# Patient Record
Sex: FEMALE | Race: WHITE | NOT HISPANIC OR LATINO | Employment: OTHER | URBAN - METROPOLITAN AREA
[De-identification: names, ages, dates, MRNs, and addresses within clinical notes are randomized per-mention and may not be internally consistent; named-entity substitution may affect disease eponyms.]

---

## 2016-07-08 LAB
CREAT SERPL-MCNC: 0.89 MG/DL (ref 0.57–1.11)
GFR SERPL CREATININE-BSD FRML MDRD: >60 ML/MIN/1.73M2
GLUCOSE SERPL-MCNC: 78 MG/DL (ref 70–100)
POTASSIUM SERPL-SCNC: 4.1 MMOL/L (ref 3.5–5.1)
TSH SERPL-ACNC: 1.22 UIU/ML (ref 0.47–5)

## 2017-02-20 DIAGNOSIS — F79 MENTAL RETARDATION: ICD-10-CM

## 2017-02-20 DIAGNOSIS — F84.0 ACTIVE AUTISTIC DISORDER: ICD-10-CM

## 2017-02-20 NOTE — TELEPHONE ENCOUNTER
ascorbic acid (VITAMIN C) 500 MG tablet      Last Written Prescription Date: 1/14/16  Last Fill Quantity: 100,  # refills: 3   Last Office Visit with [facility], [facility] or [facility] prescribing provider: 9/26/14

## 2017-02-21 RX ORDER — ASCORBIC ACID 500 MG
TABLET ORAL
Qty: 30 TABLET | Refills: 0 | Status: SHIPPED | OUTPATIENT
Start: 2017-02-21 | End: 2017-03-31

## 2017-02-21 NOTE — TELEPHONE ENCOUNTER
Routing refill request to provider for review/approval because:  Patient needs to be seen because it has been more than 1 year since last office visit. Patient has not been seen since 2014    Yi Juárze RN  [facility]

## 2017-02-23 DIAGNOSIS — R82.90 NONSPECIFIC FINDING ON EXAMINATION OF URINE: ICD-10-CM

## 2017-02-23 DIAGNOSIS — Z87.440 HISTORY OF URINARY TRACT INFECTION: ICD-10-CM

## 2017-02-23 LAB
ALBUMIN UR-MCNC: 30 MG/DL
APPEARANCE UR: ABNORMAL
BACTERIA #/AREA URNS HPF: ABNORMAL /HPF
BILIRUB UR QL STRIP: NEGATIVE
COLOR UR AUTO: YELLOW
GLUCOSE UR STRIP-MCNC: NEGATIVE MG/DL
HGB UR QL STRIP: ABNORMAL
KETONES UR STRIP-MCNC: NEGATIVE MG/DL
LEUKOCYTE ESTERASE UR QL STRIP: ABNORMAL
NITRATE UR QL: NEGATIVE
NON-SQ EPI CELLS #/AREA URNS LPF: ABNORMAL /LPF
PH UR STRIP: 6 PH (ref 5–7)
RBC #/AREA URNS AUTO: ABNORMAL /HPF (ref 0–2)
SP GR UR STRIP: 1.02 (ref 1–1.03)
URN SPEC COLLECT METH UR: ABNORMAL
UROBILINOGEN UR STRIP-ACNC: 1 EU/DL (ref 0.2–1)
WBC #/AREA URNS AUTO: >100 /HPF (ref 0–2)

## 2017-02-23 PROCEDURE — 81001 URINALYSIS AUTO W/SCOPE: CPT | Performed by: FAMILY MEDICINE

## 2017-02-23 PROCEDURE — 87086 URINE CULTURE/COLONY COUNT: CPT | Performed by: FAMILY MEDICINE

## 2017-02-23 PROCEDURE — 87088 URINE BACTERIA CULTURE: CPT | Performed by: FAMILY MEDICINE

## 2017-02-23 PROCEDURE — 87186 SC STD MICRODIL/AGAR DIL: CPT | Performed by: FAMILY MEDICINE

## 2017-02-24 ENCOUNTER — OFFICE VISIT (OUTPATIENT)
Dept: URGENT CARE | Facility: RETAIL CLINIC | Age: 32
End: 2017-02-24
Payer: MEDICARE

## 2017-02-24 VITALS
OXYGEN SATURATION: 98 % | TEMPERATURE: 99.6 F | HEART RATE: 92 BPM | SYSTOLIC BLOOD PRESSURE: 125 MMHG | DIASTOLIC BLOOD PRESSURE: 88 MMHG

## 2017-02-24 DIAGNOSIS — R05.9 COUGH: Primary | ICD-10-CM

## 2017-02-24 DIAGNOSIS — J20.9 ACUTE BRONCHITIS WITH COEXISTING CONDITION REQUIRING PROPHYLACTIC TREATMENT: ICD-10-CM

## 2017-02-24 DIAGNOSIS — N39.0 URINARY TRACT INFECTION, SITE NOT SPECIFIED: ICD-10-CM

## 2017-02-24 PROCEDURE — 99213 OFFICE O/P EST LOW 20 MIN: CPT | Performed by: NURSE PRACTITIONER

## 2017-02-24 RX ORDER — CEFDINIR 300 MG/1
300 CAPSULE ORAL 2 TIMES DAILY
Qty: 20 CAPSULE | Refills: 0 | Status: SHIPPED | OUTPATIENT
Start: 2017-02-24 | End: 2017-03-07

## 2017-02-24 NOTE — MR AVS SNAPSHOT
"              After Visit Summary   2017    Gemini Mccabe    MRN: [id number]           Patient Information     Date Of Birth          1985        Visit Information        Provider Department      2017 7:00 PM Raymond Ham APRN [facility]        Today's Diagnoses     Cough    -  1    Urinary tract infection, site not specified        Acute bronchitis with coexisting condition requiring prophylactic treatment           Follow-ups after your visit        Who to contact     You can reach your care team any time of the day by calling [phone].  Notification of test results:  If you have an abnormal lab result, we will notify you by phone as soon as possible.         Additional Information About Your Visit        MyChart Information     [employer] lets you send messages to your doctor, view your test results, renew your prescriptions, schedule appointments and more. To sign up, go to [email or web address] . Click on \"Log in\" on the left side of the screen, which will take you to the Welcome page. Then click on \"Sign up Now\" on the right side of the page.     You will be asked to enter the access code listed below, as well as some personal information. Please follow the directions to create your username and password.     Your access code is: [email or web address]  Expires: 2017  7:57 PM     Your access code will  in 90 days. If you need help or a new code, please call your [place] clinic or [phone].        Care EveryWhere ID     This is your [facility] EveryWhere ID. This could be used by other organizations to access your [place] [facility] medical records  [id number]        Your Vitals Were     Pulse Temperature Pulse Oximetry             92 99.6  F (37.6  C) (Tympanic) 98%          Blood Pressure from Last 3 Encounters:   17 125/88   16 112/90   16 108/72    Weight from Last 3 Encounters:   16 160 lb (72.6 kg)   16 160 lb (72.6 kg)   16 " 160 lb (72.6 kg)              Today, you had the following     No orders found for display         Today's Medication Changes          These changes are accurate as of: 2/24/17  7:57 PM.  If you have any questions, ask your nurse or doctor.               Start taking these medicines.        Dose/Directions    cefdinir 300 MG capsule   Commonly known as:  OMNICEF   Used for:  Acute bronchitis with coexisting condition requiring prophylactic treatment, Urinary tract infection, site not specified   Started by:  Raymond Ham, BRANDON CNP        Dose:  300 mg   Take 1 capsule (300 mg) by mouth 2 times daily   Quantity:  20 capsule   Refills:  0            Where to get your medicines      These medications were sent to [facility] - [address]  [address], [place] [address]     Phone:  [phone]     cefdinir 300 MG capsule                Primary Care Provider Office Phone #    [facility] [phone]       No address on file        Thank you!     Thank you for choosing [facility]  for your care. Our goal is always to provide you with excellent care. Hearing back from our patients is one way we can continue to improve our services. Please take a few minutes to complete the written survey that you may receive in the mail after your visit with us. Thank you!             Your Updated Medication List - Protect others around you: Learn how to safely use, store and throw away your medicines at www.disposemymeds.org.          This list is accurate as of: 2/24/17  7:57 PM.  Always use your most recent med list.                   Brand Name Dispense Instructions for use    ascorbic acid 500 MG tablet    VITAMIN C    30 tablet    TAKE 1 TABLET BY MOUTH EVERY DAY       BUTT PASTE - REGULAR    DR LOVE POOP GOOP BUTT PASTE FORMULA    60 oz    Apply  topically See Admin Instructions. With each change of adult diaper.       cefdinir 300 MG capsule    OMNICEF    20 capsule     Take 1 capsule (300 mg) by mouth 2 times daily       CHILDRENS IBUPROFEN 100 MG/5ML suspension   Generic drug:  ibuprofen     480 mL    TAKE 30 MLS BY MOUTH EVERY 6 HOURS AS NEEDED FOR FEVER.       citalopram 20 MG tablet    celeXA    90 tablet    Take 1 tablet by mouth daily.       CRANBERRY FRUIT PO      Take  by mouth. 500 mg, 1 capsule daily       DEPO-PROVERA 150 MG/ML injection   Generic drug:  medroxyPROGESTERone     0.9 mL    150mg/ml  IM every 3 months/ verbal order of LIZBETH Romano MD for 1 year in injections/KJJ       docusate 50 MG/5ML liquid    COLACE     Take 100 mg by mouth daily.       LORAZEPAM PO      Take 0.5 mg by mouth 2 times daily as needed Not to exceed 2 tabs in 24 hours       multivitamin, therapeutic with minerals Tabs tablet     100 tablet    Take 1 tablet by mouth daily       nebulizer nebulization     1 Device    4 times daily. Use 4 times a day as instructed       nitrofurantoin macrocrystal 100 MG capsule    MACRODANTIN    10 capsule    Take 1 capsule (100 mg) by mouth 2 times daily       phenazopyridine 100 MG tablet    PYRIDIUM    12 tablet    Take 1 tablet (100 mg) by mouth 3 times daily as needed for urinary tract discomfort       polyethylene glycol powder    MIRALAX/GLYCOLAX     Take 1 capful by mouth daily as needed.       RISPERIDONE PO      Take 0.5 mg by mouth daily       Salicylic Acid 40 % Pads     3 each    Externally apply 1 each topically. As directed       SEROQUEL PO      Take 300 mg by mouth daily       triamcinolone 0.1 % cream    KENALOG    15 g    Apply to the left arm rash 3 times a day       * Vinyl Gloves Misc     50 each    1 Box as needed       * NITRILE GLOVES MEDIUM Misc     100 each    USE EVERY DAY AS NEEDED *** MUST MAKE APPT. BEFORE ANY ADDITIONAL REFILLS**       * Notice:  This list has 2 medication(s) that are the same as other medications prescribed for you. Read the directions carefully, and ask your doctor or other care provider to review  them with you.

## 2017-02-25 LAB
BACTERIA SPEC CULT: ABNORMAL
MICRO REPORT STATUS: ABNORMAL
MICROORGANISM SPEC CULT: ABNORMAL
SPECIMEN SOURCE: ABNORMAL

## 2017-02-25 NOTE — NURSING NOTE
"Chief Complaint   Patient presents with     Cough     x a week     Fever       Initial /88  Pulse 92  Temp 99.6  F (37.6  C) (Tympanic)  SpO2 98% Estimated body mass index is 28.34 kg/(m^2) as calculated from the following:    Height as of 8/11/16: 5' 3\" (1.6 m).    Weight as of 8/11/16: 160 lb (72.6 kg).  Medication Reconciliation: complete   Rebeca Rabago      "

## 2017-02-25 NOTE — PROGRESS NOTES
SUBJECTIVE:  Gemini Mccabe is a 31 year old female who presents to the clinic today with a chief complaint of cough , post-tussive emesis (holds in mouth & re swallows) for 1 week(s).  Her cough is described as persistent and spasmodic.    The patient's symptoms are moderate and worsening.  Associated symptoms include congestion, fever, nasal congestion, rhinorrhea and malaise. The patient's symptoms are exacerbated by no particular triggers  Patient has been using Robitussin DM  to improve symptoms.    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    OBJECTIVE:  /88  Pulse 92  Temp 99.6  F (37.6  C) (Tympanic)  SpO2 98%  GENERAL APPEARANCE: alert, moderate distress, partially cooperative and over weight  EYES: EOMI,  PERRL, conjunctiva clear  HENT: ear canals and TM's mostly normal.  Nose without ulcers, erythema or lesions. Refused to open mouth. (suspect nausea)  NECK: supple, nontender, no lymphadenopathy  RESP: expiratory wheezes throughout and inspiratory wheezes bibasilar  CV: regular rates and rhythm, normal S1 S2, no murmur noted  SKIN: no suspicious lesions or rashes  PSYCH: Sensory issues more exacerbated /c current illness.    ASSESSMENT:    Acute Bronchitis  Cough  UTI    PLAN:  Omnicef     Checked her urine labs.  Get plenty of rest & drink plenty of fluids (mainly water).  Take OTC, or medications prescribed to treat symptoms.  Mucinex is product known to help loosen  congestion (generics are available.).   Dark Honey, such as Das Wheat Honey has been shown to be helpful in cough management.  Avoid smoke (cigarettes or fireplace/wood burning stoves).  If you develop trouble breathing, swallowing or cough-up blood, immediately go to ER.  Using a vaporizer, humidifier, or steam from hot water to add moisture to the air can help  Follow-up with primary care provider if not improving with in 3 days or symptoms worsen.  A cough may last up to 2 weeks.    Raymond TAYLOR, MSN, Family NP-C  [facility]  February 24, 2017

## 2017-03-07 ENCOUNTER — RADIANT APPOINTMENT (OUTPATIENT)
Dept: GENERAL RADIOLOGY | Facility: OTHER | Age: 32
End: 2017-03-07
Attending: PHYSICIAN ASSISTANT
Payer: MEDICARE

## 2017-03-07 ENCOUNTER — OFFICE VISIT (OUTPATIENT)
Dept: FAMILY MEDICINE | Facility: OTHER | Age: 32
End: 2017-03-07
Payer: MEDICARE

## 2017-03-07 VITALS
BODY MASS INDEX: 35.06 KG/M2 | WEIGHT: 173.9 LBS | SYSTOLIC BLOOD PRESSURE: 122 MMHG | RESPIRATION RATE: 20 BRPM | DIASTOLIC BLOOD PRESSURE: 78 MMHG | HEIGHT: 59 IN | HEART RATE: 80 BPM | TEMPERATURE: 98.6 F

## 2017-03-07 DIAGNOSIS — R05.9 COUGH: ICD-10-CM

## 2017-03-07 DIAGNOSIS — R09.89 CHEST CONGESTION: ICD-10-CM

## 2017-03-07 DIAGNOSIS — R46.89 ABNORMAL BEHAVIOR: ICD-10-CM

## 2017-03-07 DIAGNOSIS — R46.89 ABNORMAL BEHAVIOR: Primary | ICD-10-CM

## 2017-03-07 DIAGNOSIS — R09.89 ABNORMAL CHEST SOUNDS: ICD-10-CM

## 2017-03-07 DIAGNOSIS — F79 MENTAL RETARDATION: ICD-10-CM

## 2017-03-07 DIAGNOSIS — R30.0 DYSURIA: ICD-10-CM

## 2017-03-07 DIAGNOSIS — N39.0 CHRONIC UTI: ICD-10-CM

## 2017-03-07 LAB
BASOPHILS # BLD AUTO: 0 10E9/L (ref 0–0.2)
BASOPHILS NFR BLD AUTO: 0.4 %
DIFFERENTIAL METHOD BLD: NORMAL
EOSINOPHIL # BLD AUTO: 0.1 10E9/L (ref 0–0.7)
EOSINOPHIL NFR BLD AUTO: 1.7 %
ERYTHROCYTE [DISTWIDTH] IN BLOOD BY AUTOMATED COUNT: 12.9 % (ref 10–15)
HCT VFR BLD AUTO: 40.9 % (ref 35–47)
HGB BLD-MCNC: 14.3 G/DL (ref 11.7–15.7)
LYMPHOCYTES # BLD AUTO: 1.8 10E9/L (ref 0.8–5.3)
LYMPHOCYTES NFR BLD AUTO: 22.5 %
MCH RBC QN AUTO: 32.4 PG (ref 26.5–33)
MCHC RBC AUTO-ENTMCNC: 35 G/DL (ref 31.5–36.5)
MCV RBC AUTO: 93 FL (ref 78–100)
MONOCYTES # BLD AUTO: 0.7 10E9/L (ref 0–1.3)
MONOCYTES NFR BLD AUTO: 8.5 %
NEUTROPHILS # BLD AUTO: 5.2 10E9/L (ref 1.6–8.3)
NEUTROPHILS NFR BLD AUTO: 66.9 %
PLATELET # BLD AUTO: 205 10E9/L (ref 150–450)
RBC # BLD AUTO: 4.42 10E12/L (ref 3.8–5.2)
WBC # BLD AUTO: 7.8 10E9/L (ref 4–11)

## 2017-03-07 PROCEDURE — 85025 COMPLETE CBC W/AUTO DIFF WBC: CPT | Performed by: PHYSICIAN ASSISTANT

## 2017-03-07 PROCEDURE — 99214 OFFICE O/P EST MOD 30 MIN: CPT | Performed by: PHYSICIAN ASSISTANT

## 2017-03-07 PROCEDURE — 71020 XR CHEST 2 VW: CPT

## 2017-03-07 PROCEDURE — 36415 COLL VENOUS BLD VENIPUNCTURE: CPT | Performed by: PHYSICIAN ASSISTANT

## 2017-03-07 ASSESSMENT — PAIN SCALES - GENERAL: PAINLEVEL: NO PAIN (0)

## 2017-03-07 NOTE — MR AVS SNAPSHOT
"              After Visit Summary   3/7/2017    Gemini Mccabe    MRN: [id number]           Patient Information     Date Of Birth          1985        Visit Information        Provider Department      3/7/2017 2:40 PM Johny Hammer PA-C [facility]        Today's Diagnoses     Abnormal behavior    -  1    Dysuria        Chronic UTI        Mental retardation        Cough        Chest congestion        Abnormal chest sounds           Follow-ups after your visit        Future tests that were ordered for you today     Open Future Orders        Priority Expected Expires Ordered    *UA reflex to Microscopic and Culture ([facility] (except [place] and [place]) Routine  3/14/2017 3/7/2017            Who to contact     If you have questions or need follow up information about today's clinic visit or your schedule please contact [facility] directly at [phone].  Normal or non-critical lab and imaging results will be communicated to you by Physihomehart, letter or phone within 4 business days after the clinic has received the results. If you do not hear from us within 7 days, please contact the clinic through MyChart or phone. If you have a critical or abnormal lab result, we will notify you by phone as soon as possible.  Submit refill requests through mSpoke or call your pharmacy and they will forward the refill request to us. Please allow 3 business days for your refill to be completed.          Additional Information About Your Visit        MyChart Information     mSpoke lets you send messages to your doctor, view your test results, renew your prescriptions, schedule appointments and more. To sign up, go to [email or web address] . Click on \"Log in\" on the left side of the screen, which will take you to the Welcome page. Then click on \"Sign up Now\" on the right side of the page.     You will be asked to enter the access code listed below, as well as some " "personal information. Please follow the directions to create your username and password.     Your access code is: [email or web address]  Expires: 2017  7:57 PM     Your access code will  in 90 days. If you need help or a new code, please call your [facility] or [phone].        Care EveryWhere ID     This is your Care EveryWhere ID. This could be used by other organizations to access your [place] medical records  [id number]        Your Vitals Were     Pulse Temperature Respirations Height BMI (Body Mass Index)       80 98.6  F (37  C) (Tympanic) 20 4' 11\" (1.499 m) 35.12 kg/m2        Blood Pressure from Last 3 Encounters:   17 122/78   17 125/88   16 112/90    Weight from Last 3 Encounters:   17 173 lb 14.4 oz (78.9 kg)   16 160 lb (72.6 kg)   16 160 lb (72.6 kg)              We Performed the Following     CBC with platelets and differential          Today's Medication Changes          These changes are accurate as of: 3/7/17  3:33 PM.  If you have any questions, ask your nurse or doctor.               These medicines have changed or have updated prescriptions.        Dose/Directions    citalopram 20 MG tablet   Commonly known as:  celeXA   This may have changed:  how much to take   Used for:  Autistic disorder, current or active state        Dose:  20 mg   Take 1 tablet by mouth daily.   Quantity:  90 tablet   Refills:  3                Primary Care Provider Office Phone #    [facility] [phone]       No address on file        Thank you!     Thank you for choosing [facility]  for your care. Our goal is always to provide you with excellent care. Hearing back from our patients is one way we can continue to improve our services. Please take a few minutes to complete the written survey that you may receive in the mail after your visit with us. Thank you!             Your Updated Medication List - Protect others around you: Learn how to safely " use, store and throw away your medicines at www.disposemymeds.org.          This list is accurate as of: 3/7/17  3:33 PM.  Always use your most recent med list.                   Brand Name Dispense Instructions for use    ascorbic acid 500 MG tablet    VITAMIN C    30 tablet    TAKE 1 TABLET BY MOUTH EVERY DAY       BUTT PASTE - REGULAR    DR LOVE POOP GOOP BUTT PASTE FORMULA    60 oz    Apply  topically See Admin Instructions. With each change of adult diaper.       CHILDRENS IBUPROFEN 100 MG/5ML suspension   Generic drug:  ibuprofen     480 mL    TAKE 30 MLS BY MOUTH EVERY 6 HOURS AS NEEDED FOR FEVER.       citalopram 20 MG tablet    celeXA    90 tablet    Take 1 tablet by mouth daily.       CRANBERRY FRUIT PO      Take  by mouth. 500 mg, 1 capsule daily       DEPO-PROVERA 150 MG/ML injection   Generic drug:  medroxyPROGESTERone     0.9 mL    150mg/ml  IM every 3 months/ verbal order of LIZBETH Romano MD for 1 year in injections/KJJ       docusate 50 MG/5ML liquid    COLACE     Take 100 mg by mouth daily.       LORAZEPAM PO      Take 0.5 mg by mouth 2 times daily as needed Not to exceed 2 tabs in 24 hours       multivitamin, therapeutic with minerals Tabs tablet     100 tablet    Take 1 tablet by mouth daily       nebulizer nebulization     1 Device    4 times daily. Use 4 times a day as instructed       polyethylene glycol powder    MIRALAX/GLYCOLAX     Take 1 capful by mouth daily as needed.       RISPERIDONE PO      Take 0.25 mg by mouth daily       Salicylic Acid 40 % Pads     3 each    Externally apply 1 each topically. As directed       SEROQUEL PO      Take by mouth daily 100 mg in the morning and 300 mg at bedtime       triamcinolone 0.1 % cream    KENALOG    15 g    Apply to the left arm rash 3 times a day       * Vinyl Gloves Misc     50 each    1 Box as needed       * NITRILE GLOVES MEDIUM Misc     100 each    USE EVERY DAY AS NEEDED *** MUST MAKE APPT. BEFORE ANY ADDITIONAL REFILLS**       *  Notice:  This list has 2 medication(s) that are the same as other medications prescribed for you. Read the directions carefully, and ask your doctor or other care provider to review them with you.

## 2017-03-07 NOTE — NURSING NOTE
"Chief Complaint   Patient presents with     URI     recheck       Initial /78 (BP Location: Right arm, Patient Position: Chair, Cuff Size: Adult Large)  Pulse 80  Temp 98.6  F (37  C) (Tympanic)  Resp 20  Ht 4' 11\" (1.499 m)  Wt 173 lb 14.4 oz (78.9 kg)  BMI 35.12 kg/m2 Estimated body mass index is 35.12 kg/(m^2) as calculated from the following:    Height as of this encounter: 4' 11\" (1.499 m).    Weight as of this encounter: 173 lb 14.4 oz (78.9 kg).  Medication Reconciliation: complete       Regina GEORGE LPN      "

## 2017-03-07 NOTE — PROGRESS NOTES
SUBJECTIVE:                                                    Gemini Mccabe is a 31 year old female who presents to clinic today for the following health issues:      Acute Illness   Acute illness concerns: cold symptoms  Onset: recheck    Fever: no    Chills/Sweats: no    Headache (location?): no    Sinus Pressure:no    Conjunctivitis:  no    Ear Pain: no    Rhinorrhea: YES    Congestion: YES    Sore Throat: no     Cough: YES-non-productive, waxing and waning over time    Wheeze: no    Decreased Appetite: no    Nausea: no    Vomiting: no    Diarrhea:  no    Dysuria/Freq.: no    Fatigue/Achiness: no    Sick/Strep Exposure: no     Therapies Tried and outcome:     Of concern is that she has a history of chronic urinary tract infection has recently been treated for the same. The patient's main signs symptoms that raises concern from the aspect of the group home in which she lives is that her behaviors escalate and she becomes more irritable when she has infections and this is the only way that they're able tell she is ill. They report some of these signs and symptoms have been present even after prescription medications for urinary tract infection. We discussed the fact that we are seeing a lot of upper respiratory infections that have been viral in nature over the last 3 months or more and that this may be part of it with respect to a bronchitis type of issue to.    Problem list and histories reviewed & adjusted, as indicated.  Additional history: as documented    Patient Active Problem List   Diagnosis     Mental retardation     Epilepsy (H)     Absence of menstruation     Constipation     Active autistic disorder     Chronic UTI     CARDIOVASCULAR SCREENING; LDL GOAL LESS THAN 160     Pneumonia     UTI (urinary tract infection)     Past Surgical History   Procedure Laterality Date     C reimplant ureter,single ureter  1985       Social History   Substance Use Topics     Smoking status: Never Smoker     Smokeless  "tobacco: Never Used     Alcohol use No     Family History   Problem Relation Age of Onset     HEART DISEASE Father      MI at age 59     Connective Tissue Disorder Mother      lupus     HEART DISEASE Other      Grandfather           Reviewed and updated as needed this visit by clinical staff  Tobacco  Allergies  Med Hx  Surg Hx  Fam Hx  Soc Hx      Reviewed and updated as needed this visit by Provider         ROS:  Constitutional, HEENT, cardiovascular, pulmonary, gi and gu systems are negative, except as otherwise noted.    OBJECTIVE:                                                    /78 (BP Location: Right arm, Patient Position: Chair, Cuff Size: Adult Large)  Pulse 80  Temp 98.6  F (37  C) (Tympanic)  Resp 20  Ht 4' 11\" (1.499 m)  Wt 173 lb 14.4 oz (78.9 kg)  BMI 35.12 kg/m2  Body mass index is 35.12 kg/(m^2).  GENERAL: healthy, alert and no distress  NECK: no adenopathy, no asymmetry, masses, or scars and trachea midline and normal to palpation  RESP: Harsh breath sounds are noted throughout more in the left than on the right today this is difficult to assess whether or not this is truly upper airway noises transmitted to the lungs fields. I am a bit more concerned since these adventitious breath sounds are noted more on the left than on the right. She does not all instructions to cough site cannot be certain but we will pursue this further with labs and x-ray.  CV: regular rate and rhythm, normal S1 S2, no S3 or S4, no murmur, click or rub, no peripheral edema and peripheral pulses strong  ABDOMEN: soft, nontender, no hepatosplenomegaly, no masses and bowel sounds normal  MS: no gross musculoskeletal defects noted, no edema  PSYCH: Normal physical appearance and psychologic presentation are noted today I don't see anything that we have not seen before she appears her normal self. Mental retardation and development of bullae are noted.Diagnostic Test Results:  Results for orders placed or performed " in visit on 03/07/17 (from the past 24 hour(s))   CBC with platelets and differential   Result Value Ref Range    WBC 7.8 4.0 - 11.0 10e9/L    RBC Count 4.42 3.8 - 5.2 10e12/L    Hemoglobin 14.3 11.7 - 15.7 g/dL    Hematocrit 40.9 35.0 - 47.0 %    MCV 93 78 - 100 fl    MCH 32.4 26.5 - 33.0 pg    MCHC 35.0 31.5 - 36.5 g/dL    RDW 12.9 10.0 - 15.0 %    Platelet Count 205 150 - 450 10e9/L    Diff Method Automated Method     % Neutrophils 66.9 %    % Lymphocytes 22.5 %    % Monocytes 8.5 %    % Eosinophils 1.7 %    % Basophils 0.4 %    Absolute Neutrophil 5.2 1.6 - 8.3 10e9/L    Absolute Lymphocytes 1.8 0.8 - 5.3 10e9/L    Absolute Monocytes 0.7 0.0 - 1.3 10e9/L    Absolute Eosinophils 0.1 0.0 - 0.7 10e9/L    Absolute Basophils 0.0 0.0 - 0.2 10e9/L     CXR - question of right middle lobe vague infiltrate on my exam today. It will be over read by radiology. In any case the lab work values appear to be suggestive of a viral source rather than a bacterial source so this could very well be a viral pneumonia. Supportive measures discussed with caregiver.    Diagnostic Test Results:  No results found for this or any previous visit (from the past 24 hour(s)).     ASSESSMENT/PLAN:                                                    1. Abnormal behavior  Concerning for urinary tract infection. Testing will be based on urine sample that the group home will try to collect within the next 24 hours.  - CBC with platelets and differential  - XR Chest 2 Views; Future  - *UA reflex to Microscopic and Culture ([facility] (except [place] and [place]); Future    2. Dysuria  Reported not confirmed.  - *UA reflex to Microscopic and Culture ([facility] (except [place] and [place]); Future    3. Chronic UTI  Historically accurate.  - *UA reflex to Microscopic and Culture ([facility] (except [place] and [place]); Future    4. Mental  retardation  Noted  - *UA reflex to Microscopic and Culture ([facility] [place] [facility] (except [place] and [place]); Future    5. Cough  Most likely viral in nature at this point in time we will continue to observe.  - CBC with platelets and differential  - XR Chest 2 Views; Future    6. Chest congestion  Most likely viral in nature continue to observe  - CBC with platelets and differential  - XR Chest 2 Views; Future    7. Abnormal chest sounds  Scattered rales that do not clear with this patient at this point in time not being able to follow directions for a good cough.  Supportive measures discussed with caregiver. I am not certain when the last time she had any type of inhaled medications. It would look like the last prescription was given back in 2010. I'm uncertain as to how successful breathing treatment would be for Gemini. They are to discuss further with  prior to any further refills of nebulized medication. Evidently she does have a nebulizer machine at home which would suggest we should be able to support her with occasional DuoNeb treatments if that is the case.  Follow-up with PCP when necessary  - CBC with platelets and differential  - XR Chest 2 Views; Future  Johny Macedo PA-C  [facility]

## 2017-03-15 ENCOUNTER — ALLIED HEALTH/NURSE VISIT (OUTPATIENT)
Dept: FAMILY MEDICINE | Facility: OTHER | Age: 32
End: 2017-03-15
Payer: MEDICARE

## 2017-03-15 VITALS — SYSTOLIC BLOOD PRESSURE: 124 MMHG | DIASTOLIC BLOOD PRESSURE: 80 MMHG

## 2017-03-15 DIAGNOSIS — N91.2 AMENORRHEA: Primary | ICD-10-CM

## 2017-03-15 DIAGNOSIS — F79 MENTAL RETARDATION: ICD-10-CM

## 2017-03-15 PROCEDURE — 99207 ZZC NO CHARGE NURSE ONLY: CPT

## 2017-03-15 PROCEDURE — 96372 THER/PROPH/DIAG INJ SC/IM: CPT

## 2017-03-15 RX ORDER — MEDROXYPROGESTERONE ACETATE 150 MG/ML
150 INJECTION, SUSPENSION INTRAMUSCULAR
Qty: 1 ML | Refills: 3 | COMMUNITY
Start: 2017-03-15 | End: 2018-08-10

## 2017-03-15 NOTE — NURSING NOTE
The following medication was given:     MEDICATION: Depo Provera 150mg  See medication note.  Patient instructed to remain in clinic for 20 minutes afterwards, and to report any adverse reaction to me immediately.  Prior to injection verified patient identity using patient's name and date of birth.  Sonya Lara MA     3/15/2017

## 2017-03-15 NOTE — MR AVS SNAPSHOT
"              After Visit Summary   3/15/2017    Gemini Mccabe    MRN: [id number]           Patient Information     Date Of Birth          1985        Visit Information        Provider Department      3/15/2017 4:00 PM LIZETH ALLEN NURSE, [facility]        Today's Diagnoses     Amenorrhea    -  1    Mental retardation           Follow-ups after your visit        Who to contact     If you have questions or need follow up information about today's clinic visit or your schedule please contact [facility] directly at [phone].  Normal or non-critical lab and imaging results will be communicated to you by MyChart, letter or phone within 4 business days after the clinic has received the results. If you do not hear from us within 7 days, please contact the clinic through [employer] or phone. If you have a critical or abnormal lab result, we will notify you by phone as soon as possible.  Submit refill requests through [employer] or call your pharmacy and they will forward the refill request to us. Please allow 3 business days for your refill to be completed.          Additional Information About Your Visit        MyChart Information     [employer] lets you send messages to your doctor, view your test results, renew your prescriptions, schedule appointments and more. To sign up, go to [email or web address] . Click on \"Log in\" on the left side of the screen, which will take you to the Welcome page. Then click on \"Sign up Now\" on the right side of the page.     You will be asked to enter the access code listed below, as well as some personal information. Please follow the directions to create your username and password.     Your access code is: [email or web address]  Expires: 2017  8:57 PM     Your access code will  in 90 days. If you need help or a new code, please call your [facility] or [phone].        Care EveryWhere ID     This is your Care EveryWhere ID. This could be used by " other organizations to access your [place] medical records  [id number]         Blood Pressure from Last 3 Encounters:   03/15/17 124/80   03/07/17 122/78   02/24/17 125/88    Weight from Last 3 Encounters:   03/07/17 173 lb 14.4 oz (78.9 kg)   08/11/16 160 lb (72.6 kg)   08/01/16 160 lb (72.6 kg)              We Performed the Following     INJECTION INTRAMUSCULAR OR SUB-Q     MEDROXYPROGESTERONE INJ          Today's Medication Changes          These changes are accurate as of: 3/15/17  4:03 PM.  If you have any questions, ask your nurse or doctor.               These medicines have changed or have updated prescriptions.        Dose/Directions    citalopram 20 MG tablet   Commonly known as:  celeXA   This may have changed:  how much to take   Used for:  Autistic disorder, current or active state        Dose:  20 mg   Take 1 tablet by mouth daily.   Quantity:  90 tablet   Refills:  3       DEPO-PROVERA 150 MG/ML injection   This may have changed:    - how much to take  - how to take this  - when to take this  - additional instructions   Used for:  Mental retardation   Generic drug:  medroxyPROGESTERone        Dose:  150 mg   Inject 1 mL (150 mg) into the muscle every 3 months Per verbal order of Johny Macedo PA-C for injections through 3/15/2018/ace   Quantity:  1 mL   Refills:  3                Primary Care Provider Office Phone #    [facility] [phone]       No address on file        Thank you!     Thank you for choosing [facility]  for your care. Our goal is always to provide you with excellent care. Hearing back from our patients is one way we can continue to improve our services. Please take a few minutes to complete the written survey that you may receive in the mail after your visit with us. Thank you!             Your Updated Medication List - Protect others around you: Learn how to safely use, store and throw away your medicines at www.disposemymeds.org.          This list is  accurate as of: 3/15/17  4:03 PM.  Always use your most recent med list.                   Brand Name Dispense Instructions for use    ascorbic acid 500 MG tablet    VITAMIN C    30 tablet    TAKE 1 TABLET BY MOUTH EVERY DAY       BUTT PASTE - REGULAR    DR LOVE POOP GOOP BUTT PASTE FORMULA    60 oz    Apply  topically See Admin Instructions. With each change of adult diaper.       CHILDRENS IBUPROFEN 100 MG/5ML suspension   Generic drug:  ibuprofen     480 mL    TAKE 30 MLS BY MOUTH EVERY 6 HOURS AS NEEDED FOR FEVER.       citalopram 20 MG tablet    celeXA    90 tablet    Take 1 tablet by mouth daily.       CRANBERRY FRUIT PO      Take  by mouth. 500 mg, 1 capsule daily       DEPO-PROVERA 150 MG/ML injection   Generic drug:  medroxyPROGESTERone     1 mL    Inject 1 mL (150 mg) into the muscle every 3 months Per verbal order of Johny Macedo PA-C for injections through 3/15/2018/ace       docusate 50 MG/5ML liquid    COLACE     Take 100 mg by mouth daily.       LORAZEPAM PO      Take 0.5 mg by mouth 2 times daily as needed Not to exceed 2 tabs in 24 hours       multivitamin, therapeutic with minerals Tabs tablet     100 tablet    Take 1 tablet by mouth daily       nebulizer nebulization     1 Device    4 times daily. Use 4 times a day as instructed       polyethylene glycol powder    MIRALAX/GLYCOLAX     Take 1 capful by mouth daily as needed.       RISPERIDONE PO      Take 0.25 mg by mouth daily       Salicylic Acid 40 % Pads     3 each    Externally apply 1 each topically. As directed       SEROQUEL PO      Take by mouth daily 100 mg in the morning and 300 mg at bedtime       triamcinolone 0.1 % cream    KENALOG    15 g    Apply to the left arm rash 3 times a day       * Vinyl Gloves Misc     50 each    1 Box as needed       * NITRILE GLOVES MEDIUM Misc     100 each    USE EVERY DAY AS NEEDED *** MUST MAKE APPT. BEFORE ANY ADDITIONAL REFILLS**       * Notice:  This list has 2 medication(s) that are the same as  other medications prescribed for you. Read the directions carefully, and ask your doctor or other care provider to review them with you.

## 2017-03-31 DIAGNOSIS — F79 MENTAL RETARDATION: ICD-10-CM

## 2017-03-31 DIAGNOSIS — F84.0 ACTIVE AUTISTIC DISORDER: ICD-10-CM

## 2017-03-31 DIAGNOSIS — F79 UNSPECIFIED INTELLECTUAL DISABILITIES: ICD-10-CM

## 2017-03-31 NOTE — TELEPHONE ENCOUNTER
Ascorbic Acid (VITAMIN C) 500 MG tablet      Last Written Prescription Date: 02/21/2017  Last Fill Quantity: 30,  # refills: 0   Last Office Visit with [facility], [facility] or [facility] prescribing provider: 03/07/2017    Sophie Echols CMA

## 2017-03-31 NOTE — TELEPHONE ENCOUNTER
Routing refill request to provider for review/approval because:  Drug not on the [facility] refill protocol     Yi Juárez RN  [facility]

## 2017-04-03 RX ORDER — ASCORBIC ACID 500 MG
500 TABLET ORAL DAILY
Qty: 30 TABLET | Refills: 0 | Status: SHIPPED | OUTPATIENT
Start: 2017-04-03 | End: 2019-01-08

## 2017-06-22 ENCOUNTER — OFFICE VISIT (OUTPATIENT)
Dept: URGENT CARE | Facility: RETAIL CLINIC | Age: 32
End: 2017-06-22
Payer: MEDICARE

## 2017-06-22 VITALS — HEART RATE: 83 BPM | TEMPERATURE: 99.1 F | RESPIRATION RATE: 18 BRPM

## 2017-06-22 DIAGNOSIS — N39.0 URINARY TRACT INFECTION, SITE NOT SPECIFIED: ICD-10-CM

## 2017-06-22 DIAGNOSIS — R30.0 DYSURIA: Primary | ICD-10-CM

## 2017-06-22 PROCEDURE — 99213 OFFICE O/P EST LOW 20 MIN: CPT | Performed by: NURSE PRACTITIONER

## 2017-06-22 RX ORDER — NITROFURANTOIN 25; 75 MG/1; MG/1
100 CAPSULE ORAL 2 TIMES DAILY
Qty: 10 CAPSULE | Refills: 0 | Status: SHIPPED | OUTPATIENT
Start: 2017-06-22 | End: 2017-06-27

## 2017-06-22 NOTE — MR AVS SNAPSHOT
"              After Visit Summary   2017    Gemini Mccabe    MRN: [id number]           Patient Information     Date Of Birth          1985        Visit Information        Provider Department      2017 7:10 PM Raymond Ham APRN [facility]        Today's Diagnoses     Dysuria    -  1    Urinary tract infection, site not specified           Follow-ups after your visit        Future tests that were ordered for you today     Open Future Orders        Priority Expected Expires Ordered    **UA reflex to Microscopic FUTURE anytime Routine 2017            Who to contact     You can reach your care team any time of the day by calling [phone].  Notification of test results:  If you have an abnormal lab result, we will notify you by phone as soon as possible.         Additional Information About Your Visit        MyChart Information     [employer] lets you send messages to your doctor, view your test results, renew your prescriptions, schedule appointments and more. To sign up, go to [email or web address] . Click on \"Log in\" on the left side of the screen, which will take you to the Welcome page. Then click on \"Sign up Now\" on the right side of the page.     You will be asked to enter the access code listed below, as well as some personal information. Please follow the directions to create your username and password.     Your access code is: [email or web address]  Expires: 2017 12:30 PM     Your access code will  in 90 days. If you need help or a new code, please call your [facility] clinic or [phone].        Care EveryWhere ID     This is your Care EveryWhere ID. This could be used by other organizations to access your [place] medical records  [id number]        Your Vitals Were     Pulse Temperature Respirations             83 99.1  F (37.3  C) (Tympanic) 18          Blood Pressure from Last 3 Encounters:   03/15/17 124/80   17 122/78 "   02/24/17 125/88    Weight from Last 3 Encounters:   03/07/17 173 lb 14.4 oz (78.9 kg)   08/11/16 160 lb (72.6 kg)   08/01/16 160 lb (72.6 kg)              We Performed the Following     Urine Culture Aerobic Bacterial          Today's Medication Changes          These changes are accurate as of: 6/22/17 11:59 PM.  If you have any questions, ask your nurse or doctor.               Start taking these medicines.        Dose/Directions    nitrofurantoin (macrocrystal-monohydrate) 100 MG capsule   Commonly known as:  MACROBID   Used for:  Urinary tract infection, site not specified   Started by:  Raymond Ham, APRN CNP        Dose:  100 mg   Take 1 capsule (100 mg) by mouth 2 times daily for 5 days   Quantity:  10 capsule   Refills:  0         These medicines have changed or have updated prescriptions.        Dose/Directions    citalopram 20 MG tablet   Commonly known as:  celeXA   This may have changed:  how much to take   Used for:  Autistic disorder, current or active state        Dose:  20 mg   Take 1 tablet by mouth daily.   Quantity:  90 tablet   Refills:  3            Where to get your medicines      These medications were [facility] [address]  [address] [facility] [address]     Phone:  [phone]     nitrofurantoin (macrocrystal-monohydrate) 100 MG capsule                Primary Care Provider Office Phone #    [facility] [phone]       No address on file        Equal Access to Services     KAYCEE AGUILA AH: Hadii rodo siddiqui Somelisa, waaxda luqadaha, qaybta kaalmada adeegyada, pema aadm. So [facility] [phone].    ATENCIÓN: Si habla español, tiene a parker disposición servicios gratuitos de asistencia lingüística. Llame al [phone].    We comply with applicable federal civil rights laws and [place] laws. We do not discriminate on the basis of race, color, national origin, age, disability sex, sexual orientation or  gender identity.            Thank you!     Thank you for choosing [facility]  for your care. Our goal is always to provide you with excellent care. Hearing back from our patients is one way we can continue to improve our services. Please take a few minutes to complete the written survey that you may receive in the mail after your visit with us. Thank you!             Your Updated Medication List - Protect others around you: Learn how to safely use, store and throw away your medicines at www.disposemymeds.org.          This list is accurate as of: 6/22/17 11:59 PM.  Always use your most recent med list.                   Brand Name Dispense Instructions for use Diagnosis    ascorbic acid 500 MG tablet    VITAMIN C    30 tablet    Take 1 tablet (500 mg) by mouth daily    Mental retardation, Active autistic disorder       BUTT PASTE - REGULAR    DR LOVE POOP GOOP BUTT PASTE FORMULA    60 oz    Apply  topically See Admin Instructions. With each change of adult diaper.    Diaper rash       CHILDRENS IBUPROFEN 100 MG/5ML suspension   Generic drug:  ibuprofen     480 mL    TAKE 30 MLS BY MOUTH EVERY 6 HOURS AS NEEDED FOR FEVER.    Chronic UTI       citalopram 20 MG tablet    celeXA    90 tablet    Take 1 tablet by mouth daily.    Autistic disorder, current or active state       CRANBERRY FRUIT PO      Take  by mouth. 500 mg, 1 capsule daily        DEPO-PROVERA 150 MG/ML injection   Generic drug:  medroxyPROGESTERone     1 mL    Inject 1 mL (150 mg) into the muscle every 3 months Per verbal order of Johny Macedo PA-C for injections through 3/15/2018/ace    Mental retardation       docusate 50 MG/5ML liquid    COLACE     Take 100 mg by mouth daily.        LORAZEPAM PO      Take 0.5 mg by mouth 2 times daily as needed Not to exceed 2 tabs in 24 hours        multivitamin, therapeutic with minerals Tabs tablet     100 tablet    Take 1 tablet by mouth daily    Unspecified intellectual disabilities, Autistic  disorder, current or active state       nebulizer nebulization     1 Device    4 times daily. Use 4 times a day as instructed    Pneumonia       nitrofurantoin (macrocrystal-monohydrate) 100 MG capsule    MACROBID    10 capsule    Take 1 capsule (100 mg) by mouth 2 times daily for 5 days    Urinary tract infection, site not specified       polyethylene glycol powder    MIRALAX/GLYCOLAX     Take 1 capful by mouth daily as needed.        RISPERIDONE PO      Take 0.25 mg by mouth daily        Salicylic Acid 40 % Pads     3 each    Externally apply 1 each topically. As directed    Plantar warts       SEROQUEL PO      Take by mouth daily 100 mg in the morning and 300 mg at bedtime        triamcinolone 0.1 % cream    KENALOG    15 g    Apply to the left arm rash 3 times a day        * Vinyl Gloves Misc     50 each    1 Box as needed    Constipation, unspecified constipation type, Active autistic disorder       * NITRILE GLOVES MEDIUM Misc     100 each    USE EVERY DAY AS NEEDED *** MUST MAKE APPT. BEFORE ANY ADDITIONAL REFILLS**    Active autistic disorder, Constipation, unspecified constipation type       * Notice:  This list has 2 medication(s) that are the same as other medications prescribed for you. Read the directions carefully, and ask your doctor or other care provider to review them with you.

## 2017-06-22 NOTE — PROGRESS NOTES
"SUBJECTIVE:  Gemini Mccabe is a 31 year old nonverbal female  who  presents today for a possible UTI.  She wears depends and has a small kidney and is prone to UTI's  Symptoms of dysuria, says \"ow\" when goes & gets wiped, seems to hurt to walk post void have been going on for 1 day(for sure).  Hematuria no.  sudden onsetand moderate.    There is no history of fever, chills, nausea or vomiting.  No history of vaginal discharge. This patient does have a history of urinary tract infections.   Patient appears to denie long duration, rigors, flank pain, temperature > 101 degrees F. and Vomiting, significant nausea or diarrhea or vaginal discharge and vaginal itching. Does seem to have vaginal odor.    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     Unspecified constipation      Unspecified epilepsy without mention of intractable epilepsy     Seizure disorder-last 1998, not on meds     Unspecified intellectual disabilities     Non verbal       PAST SURGICAL HISTORY:   Past Surgical History:   Procedure Laterality Date     C REIMPLANT URETER,SINGLE URETER  1985       FAMILY HISTORY:   Family History   Problem Relation Age of Onset     HEART DISEASE Father      MI at age 59     Connective Tissue Disorder Mother      lupus     HEART DISEASE Other      Grandfather       SOCIAL HISTORY:   Social History   Substance Use Topics     Smoking status: Never Smoker     Smokeless tobacco: Never Used     Alcohol use No         Current Outpatient Prescriptions   Medication     nitrofurantoin, macrocrystal-monohydrate, (MACROBID) 100 MG capsule     ascorbic acid (VITAMIN C) 500 MG tablet     CHILDRENS IBUPROFEN 100 MG/5ML suspension     Disposable Gloves (NITRILE GLOVES MEDIUM) MISC     Disposable Gloves (VINYL GLOVES) MISC     " multivitamin, therapeutic with minerals (MULTI-VITAMIN) TABS     citalopram (CELEXA) 20 MG tablet     docusate (COLACE) 50 MG/5ML liquid     polyethylene glycol (MIRALAX/GLYCOLAX) powder     QUEtiapine Fumarate (SEROQUEL PO)     RISPERIDONE PO     LORAZEPAM PO     CRANBERRY FRUIT PO     BUTT PASTE - REGULAR (DR LOVE POOP GOOP BUTT PASTE FORMULA)     Nebulizer nebulization     medroxyPROGESTERone (DEPO-PROVERA) 150 MG/ML injection     triamcinolone (KENALOG) 0.1 % cream     Salicylic Acid 40 % PADS     No current facility-administered medications for this visit.         ROS:   Review of systems negative except as stated above.    OBJECTIVE:  Vitals:    06/22/17 1849   Pulse: 83   Resp: 18   Temp: 99.1  F (37.3  C)   TempSrc: Tympanic     GENERAL APPEARANCE: mild distress, moderate distress, mostly cooperative, over weight and nonverbal  RESP: lungs clear to auscultation - no rales, rhonchi or wheezes  CV: regular rates and rhythm, normal S1 S2, no murmur noted  ABDOMEN: obese, soft, tenderness suspect mild and moderate suprapubic  BACK: No CVA tenderness  SKIN: no suspicious lesions or rashes    ASSESSMENT:      Dysuria  Urinary tract infection, site not specified      PLAN:  We will treat /c Macrobid - patient has a significant Hx & risk factors.  Will follow-up /c lab for urine  Follow-up /c PCP  Drink plenty of fluids.    Prevention and treatment of UTI's discussed.  Signs and symptoms of pyelonephritis mentioned.    Discussed affect of antibiotics on birth control & the need to use alternative forms of birth control.  Handouts offered & plan of care reviewed.  Take a probiotic or eat yogurt while on antibiotics.  Urine culture pending. Pt will be contacted if change in treatment plan is indicated.  If symptoms do not improve in a few days or if at anytime she is worse she will follow up with her primary care provider and enc appointment to teja vag discharge and these symptoms.    Raymond Ham MSN, APRN,  Family NP-C  [facility]

## 2017-06-22 NOTE — NURSING NOTE
"Chief Complaint   Patient presents with     Dysuria     started today, patient is noverbal but while using the bathroom was yelling. Not drinking much fluid        Initial Pulse 83  Temp 99.1  F (37.3  C) (Tympanic)  Resp 18 Estimated body mass index is 35.12 kg/(m^2) as calculated from the following:    Height as of 3/7/17: 4' 11\" (1.499 m).    Weight as of 3/7/17: 173 lb 14.4 oz (78.9 kg).  Medication Reconciliation: complete     Jessica Sundet      "

## 2017-06-25 LAB
BACTERIA SPEC CULT: NORMAL
MICRO REPORT STATUS: NORMAL
SPECIMEN SOURCE: NORMAL

## 2017-07-05 ENCOUNTER — NURSE TRIAGE (OUTPATIENT)
Dept: NURSING | Facility: CLINIC | Age: 32
End: 2017-07-05

## 2017-07-06 NOTE — TELEPHONE ENCOUNTER
"Takes quetiapine 100mg q AM and 300mg q HS.  Today group home staff mistakenly gave pt the 300mg dose in the morning instead of the 100mg dose. Staff is just now discovered the error. Pt apparently did not have any ill effects today and is feeling well currently. Group home staff will give pt the 100mg dose tonight so today's total dose will still be 400mg. Advised that med is supposed to help pt sleep at night so important to give larger dose at hs. Letitia Iglesias RN/FNA    Additional Information    Negative: Drug overdose and nurse unable to answer question    Negative: Caller requesting information not related to medicine    Negative: Caller requesting a prescription for Strep throat and has a positive culture result    Negative: Rash while taking a medication or within 3 days of stopping it    Negative: Immunization reaction suspected    Negative: [1] Asthma and [2] having symptoms of asthma (cough, wheezing, etc)    Negative: [1] DOUBLE DOSE (an extra dose or lesser amount) of over-the-counter (OTC) drug AND [2] any symptoms (e.g., dizziness, nausea, pain, sleepiness)    Negative: [1] DOUBLE DOSE (an extra dose or lesser amount) of prescription drug AND [2] any symptoms (e.g., dizziness, nausea, pain, sleepiness)    Negative: Took another person's prescription drug    Negative: Diabetes drug error or overdose (e.g., insulin or extra dose)    Negative: [1] Request for URGENT new prescription or refill of \"essential\" medication (i.e., likelihood of harm to patient if not taken) AND [2] triager unable to fill per unit policy    Negative: [1] Prescription not at pharmacy AND [2] was prescribed today by PCP    Negative: Pharmacy calling with prescription questions and triager unable to answer question    Negative: Caller has URGENT medication question about med that PCP prescribed and triager unable to answer question    Negative: Caller has NON-URGENT medication question about med that PCP prescribed and triager " unable to answer question    Negative: Caller requesting a NON-URGENT new prescription or refill and triager unable to refill per unit policy    Negative: Caller has medication question about med not prescribed by PCP and triager unable to answer question (e.g., compatibility with other med, storage)    Negative: [1] DOUBLE DOSE (an extra dose or lesser amount) of over-the-counter (OTC) drug AND [2] NO symptoms (all triage questions negative)    Negative: [1] DOUBLE DOSE (an extra dose or lesser amount) of antibiotic drug AND [2] NO symptoms (all triage questions negative)    Caller has medication question only, adult not sick, and triager answers question    Commented on: MORE THAN A DOUBLE DOSE of a prescription or over-the-counter (OTC) drug     See note    Commented on: [1] DOUBLE DOSE (an extra dose or lesser amount) of prescription drug AND [2] NO symptoms (Exception: a double dose of antibiotics)     Extra Seroquel given this AM. No sx today or now.    Protocols used: MED[facility] QUESTION CALL[facility]

## 2017-07-28 LAB
CHOLEST SERPL-MCNC: 170 MG/DL (ref 0–200)
GLUCOSE SERPL-MCNC: 82 MG/DL (ref 70–100)
HDLC SERPL-MCNC: 41 MG/DL
LDLC SERPL CALC-MCNC: 113 MG/DL (ref 0–159)
TRIGL SERPL-MCNC: 79 MG/DL (ref 30–150)
TSH SERPL-ACNC: 1.01 UIU/ML (ref 0.47–5)

## 2017-09-25 ENCOUNTER — TELEPHONE (OUTPATIENT)
Dept: FAMILY MEDICINE | Facility: OTHER | Age: 32
End: 2017-09-25

## 2017-09-25 ENCOUNTER — ALLIED HEALTH/NURSE VISIT (OUTPATIENT)
Dept: FAMILY MEDICINE | Facility: OTHER | Age: 32
End: 2017-09-25
Payer: MEDICARE

## 2017-09-25 ENCOUNTER — TRANSFERRED RECORDS (OUTPATIENT)
Dept: HEALTH INFORMATION MANAGEMENT | Facility: CLINIC | Age: 32
End: 2017-09-25

## 2017-09-25 DIAGNOSIS — Z30.42 DEPO-PROVERA CONTRACEPTIVE STATUS: Primary | ICD-10-CM

## 2017-09-25 DIAGNOSIS — N91.2 ABSENCE OF MENSTRUATION: ICD-10-CM

## 2017-09-25 LAB — BETA HCG QUAL IFA URINE: NEGATIVE

## 2017-09-25 PROCEDURE — 84703 CHORIONIC GONADOTROPIN ASSAY: CPT | Performed by: FAMILY MEDICINE

## 2017-09-25 PROCEDURE — 96372 THER/PROPH/DIAG INJ SC/IM: CPT

## 2017-09-25 NOTE — MR AVS SNAPSHOT
"              After Visit Summary   9/25/2017    Gemini Mccabe    MRN: [id number]           Patient Information     Date Of Birth          1985        Visit Information        Provider Department      9/25/2017 3:30 PM LIZETH ALLEN NURSE, [facility]        Today's Diagnoses     Depo-Provera contraceptive status    -  1    Absence of menstruation           Follow-ups after your visit        Your next 10 appointments already scheduled     Dec 11, 2017  4:00 PM CST   Nurse Only with LIZETH ALELN NURSE, [facility] ([facility])    [address] [facility] [id number] [phone]              Who to contact     If you have questions or need follow up information about today's clinic visit or your schedule please contact [facility] directly at [phone].  Normal or non-critical lab and imaging results will be communicated to you by [employer], letter or phone within 4 business days after the clinic has received the results. If you do not hear from us within 7 days, please contact the clinic through [employer] or phone. If you have a critical or abnormal lab result, we will notify you by phone as soon as possible.  Submit refill requests through [employer] or call your pharmacy and they will forward the refill request to us. Please allow 3 business days for your refill to be completed.          Additional Information About Your Visit        MyChart Information     [employer] lets you send messages to your doctor, view your test results, renew your prescriptions, schedule appointments and more. To sign up, go to [email or web address] . Click on \"Log in\" on the left side of the screen, which will take you to the Welcome page. Then click on \"Sign up Now\" on the right side of the page.     You will be asked to enter the access code listed below, as well as some personal information. Please follow the directions to create your username and password.     Your access " code is: [email or web address]  Expires: 2017  3:58 PM     Your access code will  in 90 days. If you need help or a new code, please call your [facility] clinic or [phone].        Care EveryWhere ID     This is your Care EveryWhere ID. This could be used by other organizations to access your [facility] medical records  [id number]         Blood Pressure from Last 3 Encounters:   03/15/17 124/80   17 122/78   17 125/88    Weight from Last 3 Encounters:   17 173 lb 14.4 oz (78.9 kg)   16 160 lb (72.6 kg)   16 160 lb (72.6 kg)              We Performed the Following     Beta HCG qual IFA urine - [facility] and [place]     INJECTION INTRAMUSCULAR OR SUB-Q     MEDROXYPROGESTERONE INJ          Today's Medication Changes          These changes are accurate as of: 17  3:58 PM.  If you have any questions, ask your nurse or doctor.               These medicines have changed or have updated prescriptions.        Dose/Directions    citalopram 20 MG tablet   Commonly known as:  celeXA   This may have changed:  how much to take   Used for:  Autistic disorder, current or active state        Dose:  20 mg   Take 1 tablet by mouth daily.   Quantity:  90 tablet   Refills:  3                Primary Care Provider Office Phone #    [facility] [phone]       No address on file        Equal Access to Services     KAYCEE AGUILA AH: Gem Vigil, waaxda luelías, qaybta kaalpema montiel. So [facility] [phone].    ATENCIÓN: Si habla español, tiene a parker disposición servicios gratuitos de asistencia lingüística. Ortiz al [phone].    We comply with applicable federal civil rights laws and [place] laws. We do not discriminate on the basis of race, color, national origin, age, disability sex, sexual orientation or gender identity.            Thank you!     Thank you for choosing [facility]  for your care. Our goal is  always to provide you with excellent care. Hearing back from our patients is one way we can continue to improve our services. Please take a few minutes to complete the written survey that you may receive in the mail after your visit with us. Thank you!             Your Updated Medication List - Protect others around you: Learn how to safely use, store and throw away your medicines at www.disposemymeds.org.          This list is accurate as of: 9/25/17  3:58 PM.  Always use your most recent med list.                   Brand Name Dispense Instructions for use Diagnosis    ascorbic acid 500 MG tablet    VITAMIN C    30 tablet    Take 1 tablet (500 mg) by mouth daily    Mental retardation, Active autistic disorder       BUTT PASTE - REGULAR    DR LOVE POOP GOOP BUTT PASTE FORMULA    60 oz    Apply  topically See Admin Instructions. With each change of adult diaper.    Diaper rash       CHILDRENS IBUPROFEN 100 MG/5ML suspension   Generic drug:  ibuprofen     480 mL    TAKE 30 MLS BY MOUTH EVERY 6 HOURS AS NEEDED FOR FEVER.    Chronic UTI       citalopram 20 MG tablet    celeXA    90 tablet    Take 1 tablet by mouth daily.    Autistic disorder, current or active state       CRANBERRY FRUIT PO      Take  by mouth. 500 mg, 1 capsule daily        DEPO-PROVERA 150 MG/ML injection   Generic drug:  medroxyPROGESTERone     1 mL    Inject 1 mL (150 mg) into the muscle every 3 months Per verbal order of Johny Macedo PA-C for injections through 3/15/2018/ace    Mental retardation       docusate 50 MG/5ML liquid    COLACE     Take 100 mg by mouth daily.        LORAZEPAM PO      Take 0.5 mg by mouth 2 times daily as needed Not to exceed 2 tabs in 24 hours        multivitamin, therapeutic with minerals Tabs tablet     100 tablet    Take 1 tablet by mouth daily    Unspecified intellectual disabilities, Autistic disorder, current or active state       nebulizer nebulization     1 Device    4 times daily. Use 4 times a day as instructed     Pneumonia       polyethylene glycol powder    MIRALAX/GLYCOLAX     Take 1 capful by mouth daily as needed.        RISPERIDONE PO      Take 0.25 mg by mouth daily        Salicylic Acid 40 % Pads     3 each    Externally apply 1 each topically. As directed    Plantar warts       SEROQUEL PO      Take by mouth daily 100 mg in the morning and 300 mg at bedtime        triamcinolone 0.1 % cream    KENALOG    15 g    Apply to the left arm rash 3 times a day        * Vinyl Gloves Misc     50 each    1 Box as needed    Constipation, unspecified constipation type, Active autistic disorder       * NITRILE GLOVES MEDIUM Misc     100 each    USE EVERY DAY AS NEEDED *** MUST MAKE APPT. BEFORE ANY ADDITIONAL REFILLS**    Active autistic disorder, Constipation, unspecified constipation type       * Notice:  This list has 2 medication(s) that are the same as other medications prescribed for you. Read the directions carefully, and ask your doctor or other care provider to review them with you.

## 2017-09-25 NOTE — TELEPHONE ENCOUNTER
Called and spoke to Marisol and states that it is almost impossible to get a UA on Gemini. Wondering if she can get a lab test for UCG?

## 2017-09-25 NOTE — TELEPHONE ENCOUNTER
Marisol Patel states that Gemini is new to their group home and was wanting to get her depo inj.  Her last one here was in March.  I don't see an order for this but wanted to check with you.  Can you see if she would be ok with getting this today?

## 2017-09-25 NOTE — NURSING NOTE
The following medication was given:     MEDICATION: Medroxyprogesterone 150 mg  See medication note.  Patient instructed to remain in clinic for 20 minutes afterwards, and to report any adverse reaction to me immediately.  Prior to injection verified patient identity using patient's name and date of birth.  Sonya Lara MA     9/25/2017

## 2017-09-25 NOTE — TELEPHONE ENCOUNTER
She is late for this injection so a urine pregnancy test needs to be complete prior to doing the injection.   Sonya Lara MA     9/25/2017

## 2017-09-25 NOTE — TELEPHONE ENCOUNTER
Marisol called back and said she gave us the wrong phone number. Please call [phone].  Thank you,  Sue Story   for [facility]

## 2017-10-20 ENCOUNTER — NURSE TRIAGE (OUTPATIENT)
Dept: NURSING | Facility: CLINIC | Age: 32
End: 2017-10-20

## 2017-10-20 ENCOUNTER — HOSPITAL ENCOUNTER (EMERGENCY)
Facility: CLINIC | Age: 32
Discharge: HOME | End: 2017-10-20
Attending: PHYSICIAN ASSISTANT | Admitting: PHYSICIAN ASSISTANT
Payer: MEDICARE

## 2017-10-20 VITALS
DIASTOLIC BLOOD PRESSURE: 100 MMHG | TEMPERATURE: 97.5 F | SYSTOLIC BLOOD PRESSURE: 121 MMHG | HEART RATE: 89 BPM | WEIGHT: 174.3 LBS | RESPIRATION RATE: 20 BRPM | BODY MASS INDEX: 35.2 KG/M2

## 2017-10-20 DIAGNOSIS — F71 MODERATE INTELLECTUAL DISABILITIES: ICD-10-CM

## 2017-10-20 DIAGNOSIS — N39.0 RECURRENT URINARY TRACT INFECTION: Primary | ICD-10-CM

## 2017-10-20 DIAGNOSIS — R30.0 DYSURIA: ICD-10-CM

## 2017-10-20 DIAGNOSIS — N39.0 URINARY TRACT INFECTION WITHOUT HEMATURIA, SITE UNSPECIFIED: ICD-10-CM

## 2017-10-20 LAB
ALBUMIN UR-MCNC: NEGATIVE MG/DL
APPEARANCE UR: CLEAR
BACTERIA #/AREA URNS HPF: ABNORMAL /HPF
BILIRUB UR QL STRIP: NEGATIVE
COLOR UR AUTO: YELLOW
GLUCOSE UR STRIP-MCNC: NEGATIVE MG/DL
HGB UR QL STRIP: NEGATIVE
KETONES UR STRIP-MCNC: NEGATIVE MG/DL
LEUKOCYTE ESTERASE UR QL STRIP: ABNORMAL
NITRATE UR QL: NEGATIVE
PH UR STRIP: 6 PH (ref 5–7)
RBC #/AREA URNS AUTO: ABNORMAL /HPF
SOURCE: ABNORMAL
SP GR UR STRIP: 1.01 (ref 1–1.03)
UROBILINOGEN UR STRIP-ACNC: 0.2 EU/DL (ref 0.2–1)
WBC #/AREA URNS AUTO: ABNORMAL /HPF

## 2017-10-20 PROCEDURE — 51798 US URINE CAPACITY MEASURE: CPT | Performed by: PHYSICIAN ASSISTANT

## 2017-10-20 PROCEDURE — 87086 URINE CULTURE/COLONY COUNT: CPT | Performed by: FAMILY MEDICINE

## 2017-10-20 PROCEDURE — 99284 EMERGENCY DEPT VISIT MOD MDM: CPT | Mod: 25 | Performed by: PHYSICIAN ASSISTANT

## 2017-10-20 PROCEDURE — 99284 EMERGENCY DEPT VISIT MOD MDM: CPT | Mod: Z6 | Performed by: PHYSICIAN ASSISTANT

## 2017-10-20 PROCEDURE — 81001 URINALYSIS AUTO W/SCOPE: CPT | Performed by: FAMILY MEDICINE

## 2017-10-20 PROCEDURE — 87088 URINE BACTERIA CULTURE: CPT | Performed by: FAMILY MEDICINE

## 2017-10-20 PROCEDURE — 87186 SC STD MICRODIL/AGAR DIL: CPT | Performed by: FAMILY MEDICINE

## 2017-10-20 RX ORDER — NITROFURANTOIN 25; 75 MG/1; MG/1
100 CAPSULE ORAL 2 TIMES DAILY
Qty: 14 CAPSULE | Refills: 0 | Status: SHIPPED | OUTPATIENT
Start: 2017-10-20 | End: 2018-03-24

## 2017-10-20 RX ORDER — PHENAZOPYRIDINE HYDROCHLORIDE 200 MG/1
200 TABLET, FILM COATED ORAL 3 TIMES DAILY PRN
Qty: 9 TABLET | Refills: 0 | Status: SHIPPED | OUTPATIENT
Start: 2017-10-20 | End: 2017-10-23

## 2017-10-20 NOTE — TELEPHONE ENCOUNTER
"Pt resides in group home ([facility]). Spoke w/ caregiver at group home (Marisol). A urine sample was brought to  clinic today. Lab ordered by PCP, Dr. Maggie Coker of [place], [place]. UA micro shows WBCs (10-25) and \"many\" bacteria. UC pending.  Marisol states group home staff have contacted PCP clinic and have been trying to get an order to treat pt for UTI based on this prelim result which lab faxed to [facility] today. [facility] never got call back from [facility]. Marisol called [facility] again a short time ago. She was told \"clinic is closed, nothing more we can do for you\". Marisol was told there was no on-call doctor to handle after hours concerns. This sounds impossible but this is the message Marisol got out of her call w/ [facility]. She is worried because pt \"has not urinated all day\". Pt is non-verbal. Marisol believes pt refusing to urinate due to dysuria. Last urinated at 6 AM today (12 hours ago) despite staff encouragement. Drinking normally. Advised because Marisol has not urinated in 12 hours, rec ED now. Group home will comply. Letitia Iglesias RN/FNA    Reason for Disposition    Patient sounds very sick or weak to the triager    Additional Information    Negative: [1] Diagnosed urine infection AND [2] female taking antibiotic    Negative: [1] Diagnosed urine infection AND [2] male taking antibiotic    Protocols used: URINALYSIS RESULTS FOLLOW-UP CALL-ADULT-    "

## 2017-10-20 NOTE — ED AVS SNAPSHOT
[facility] Emergency Department    [address] [place] [address]    Phone:  [phone]    Fax:  [phone]                                       Gemini Mccabe   MRN: [id number]    Department:  [facility] Emergency [facility]   Date of Visit:  10/20/2017           After Visit Summary Signature Page     I have received my discharge instructions, and my questions have been answered. I have discussed any challenges I see with this plan with the nurse or doctor.    ..........................................................................................................................................  Patient/Patient Representative Signature      ..........................................................................................................................................  Patient Representative Print Name and Relationship to Patient    ..................................................               ................................................  Date                                            Time    ..........................................................................................................................................  Reviewed by Signature/Title    ...................................................              ..............................................  Date                                                            Time           Hospital Day: 3   Postpartum Day #  1    Patient Active Problem List   Diagnosis    MAL (generalized anxiety disorder)    Social anxiety disorder    Excessive caffeine intake    Encounter for supervision of normal pregnancy in third trimester    Abnormal glucose tolerance test (GTT) during pregnancy, antepartum    Diet controlled gestational diabetes mellitus (GDM) in third trimester    Pre-eclampsia in third trimester    Threatened labor at term       Nolvia reports that she is feeling well postpartum. She is tolerating feedings. She denies nausea, vomiting. She is not passing flatus yet.  She reports that pain is well controlled with pain meds. Pruitt has been discontinued. Lochia is minimal.    She is ambulating without difficulty. Voiding without difficulty.  Denies weakness/dizziness/CP/SOB.  No HA/Vision changes/RUQ pain.     Vitals:    17 0330   BP: (!) 142/94   Pulse: 103   Resp: 18   Temp: 98.4 °F (36.9 °C)         Temp Range:  Temp:  [96.3 °F (35.7 °C)-99.6 °F (37.6 °C)]      Physical Exam:   NAD, AAOx3   Chest: Clear to auscultation   Breasts: Soft. Nontender. breast feeding. Cardiovascular: Regular rate and Rhythm. No tachycardia   Abd: normal size, well involuted, firm, non-tender   Inc: bandage dry with no significant drainage.  Lochia: minimal      I/O last 3 completed shifts:  In: 3947.8 [P.O.:300; I.V.:2547.8; IV Piggyback:1100]  Out: 4000 [Urine:2900; Blood:1100]         Recent Lab:    Lab Results   Component Value Date    WBC 15.34 (H) 2017    HGB 7.3 (L) 2017    HCT 22.5 (L) 2017    MCV 81 (L) 2017     2017            Information for the patient's :  [place] [[id number]]     Results for orders placed or performed during the hospital encounter of 17   Cord blood evaluation   Result Value Ref Range    Cord ABO A     Cord Rh NEG     Cord Direct Mabel NEG          Assessment: s/p 1LTCS POD#1 with preeclampsia, GDMA1, and anemia of  acute blood loss     Plan: Activity: ad jer   Diet: General/Regular   Medications: current medication regimen unchanged.   Lab: CBC reviewed and discussed with patient.  She states that she is asymptomatic.  Mild tachycardia overnight, but improving this am.  Discussed blood transfusion versus monitoring and IV iron.  Will continue with iron and close monitoring at this time as patient remains asymptomatic.  Repeat CBC tomorrow am.    BP remain mild range.  Patient asymptomatic.

## 2017-10-20 NOTE — TELEPHONE ENCOUNTER
----- Message from Radha Brown sent at 10/20/2017  6:00 PM CDT -----  Reason for call:  Results   Name of test or procedure: UTI testing   Date of test or procedure: 10/20/2017  Location of test or procedure: [place]    Additional comments: Wanting results. Care giver called from [facility]. [facility].     Phone number to reach patient:  Other phone number:  [phone]    Best Time:  Anytime.    Can we leave a detailed message on this number?  YES

## 2017-10-20 NOTE — ED AVS SNAPSHOT
[facility] Emergency Department    [address] [place] [address]    Phone:  [phone]    Fax:  [phone]                                       Gemini Mccabe   MRN: [id number]    Department:  [facility] Emergency Department   Date of Visit:  10/20/2017           Patient Information     Date Of Birth          1985        Your diagnoses for this visit were:     Urinary tract infection without hematuria, site unspecified        You were seen by Davis Anderson PA-C.      Follow-up Information     Follow up with Maggie Coker    Specialty:  Family Practice    Why:  As needed, If symptoms worsen    Contact information:    [facility] [place]  [address] [address]  [phone]          Discharge Instructions         Urinary Tract Infections in Women    Urinary tract infections (UTIs) are most often caused by bacteria (germs). These bacteria enter the urinary tract. The bacteria may come from outside the body. Or they may travel from the skin outside the rectum or vagina into the urethra. Female anatomy makes it easy for bacteria from the bowel to enter a woman s urinary tract, which is the most common source of UTI. This means women develop UTIs more often than men. Pain in or around the urinary tract is a common UTI symptom. But the only way to know for sure if you have a UTI for the healthcare provider to test your urine. The two tests that may be done are the urinalysis and urine culture.  Types of UTIs    Cystitis: A bladder infection (cystitis) is the most common UTI in women. You may have urgent or frequent urination. You may also have pain, burning when you urinate, and bloody urine.    Urethritis: This is an inflamed urethra, which is the tube that carries urine from the bladder to outside the body. You may have lower stomach or back pain. You may also have urgent or frequent urination.    Pyelonephritis: This is a kidney infection. If  not treated, it can be serious and damage your kidneys. In severe cases, you may be hospitalized. You may have a fever and lower back pain.  Medicines to treat a UTI  Most UTIs are treated with antibiotics. These kill the bacteria. The length of time you need to take them depends on the type of infection. It may be as short as 3 days. If you have repeated UTIs, a low-dose antibiotic may be needed for several months. Take antibiotics exactly as directed. Don t stop taking them until all of the medicine is gone. If you stop taking the antibiotic too soon, the infection may not go away, and you may develop a resistance to the antibiotic. This can make it much harder to treat.  Lifestyle changes to treat and prevent UTIs  The lifestyle changes below will help get rid of your UTI. They may also help prevent future UTIs.    Drink plenty of fluids. This includes water, juice, or other caffeine-free drinks. Fluids help flush bacteria out of your body.    Empty your bladder. Always empty your bladder when you feel the urge to urinate. And always urinate before going to sleep. Urine that stays in your bladder can lead to infection. Try to urinate before and after sex as well.    Practice good personal hygiene. Wipe yourself from front to back after using the toilet. This helps keep bacteria from getting into the urethra.    Use condoms during sex. These help prevent UTIs caused by sexually transmitted bacteria. Also, avoid using spermicides during sex. These can increase the risk of UTIs. Choose other forms of birth control instead. For women who tend to get UTIs after sex, a low-dose of a preventive antibiotic may be used. Be sure to discuss this option with your healthcare provider.    Follow up with your healthcare provider as directed. He or she may test to make sure the infection has cleared. If needed, more treatment may be started.  Date Last Reviewed: 1/1/2017 2000-2017 [employer] [facility]. [address], [place] [address]. All rights reserved. This information is not intended as a substitute for professional medical care. Always follow your healthcare professional's instructions.          Future Appointments        Provider Department Dept Phone Center    12/11/2017 4:00 PM LIZETH ALLEN NURSE, [facility] [phone] [place] [facility]      24 Hour Appointment Hotline       To make an appointment at any [facility] ([facility]), call [phone] ([phone]). If you don't have a family doctor or clinic, we will help you find one. [facility] are conveniently located to serve the needs of you and your family.             Review of your medicines      START taking        Dose / Directions Last dose taken    nitroFURantoin (macrocrystal-monohydrate) 100 MG capsule   Commonly known as:  MACROBID   Dose:  100 mg   Quantity:  14 capsule        Take 1 capsule (100 mg) by mouth 2 times daily   Refills:  0        phenazopyridine 200 MG tablet   Commonly known as:  PYRIDIUM   Dose:  200 mg   Quantity:  9 tablet        Take 1 tablet (200 mg) by mouth 3 times daily as needed for irritation   Refills:  0          Our records show that you are taking the medicines listed below. If these are incorrect, please call your family doctor or clinic.        Dose / Directions Last dose taken    ascorbic acid 500 MG tablet   Commonly known as:  VITAMIN C   Dose:  500 mg   Quantity:  30 tablet        Take 1 tablet (500 mg) by mouth daily   Refills:  0        BUTT PASTE - REGULAR   Commonly known as:  DR JAX PINO BUTT PASTE FORMULA   Quantity:  60 oz        Apply  topically See Admin Instructions. With each change of adult diaper.   Refills:  12        CHILDRENS IBUPROFEN 100 MG/5ML suspension   Quantity:  480 mL   Generic drug:  ibuprofen        TAKE 30 MLS BY MOUTH EVERY 6 HOURS AS NEEDED FOR FEVER.   Refills:  3        citalopram 20 MG tablet   Commonly known as:  celeXA   Dose:  20 mg   Quantity:  90 tablet         Take 1 tablet by mouth daily.   Refills:  3        CRANBERRY FRUIT PO        Take  by mouth. 500 mg, 1 capsule daily   Refills:  0        DEPO-PROVERA 150 MG/ML injection   Dose:  150 mg   Quantity:  1 mL   Generic drug:  medroxyPROGESTERone        Inject 1 mL (150 mg) into the muscle every 3 months Per verbal order of Johny Macedo PA-C for injections through 3/15/2018/ace   Refills:  3        docusate 50 MG/5ML liquid   Commonly known as:  COLACE   Dose:  100 mg        Take 100 mg by mouth daily.   Refills:  0        LORAZEPAM PO   Dose:  0.5 mg        Take 0.5 mg by mouth 2 times daily as needed Not to exceed 2 tabs in 24 hours   Refills:  0        multivitamin, therapeutic with minerals Tabs tablet   Dose:  1 tablet   Quantity:  100 tablet        Take 1 tablet by mouth daily   Refills:  3        nebulizer nebulization   Quantity:  1 Device        4 times daily. Use 4 times a day as instructed   Refills:  0        polyethylene glycol powder   Commonly known as:  MIRALAX/GLYCOLAX   Dose:  1 capful        Take 1 capful by mouth daily as needed.   Refills:  0        RISPERIDONE PO   Dose:  0.25 mg        Take 0.25 mg by mouth daily   Refills:  0        Salicylic Acid 40 % Pads   Dose:  1 each   Quantity:  3 each        Externally apply 1 each topically. As directed   Refills:  0        SEROQUEL PO        Take by mouth daily 100 mg in the morning and 300 mg at bedtime   Refills:  0        triamcinolone 0.1 % cream   Commonly known as:  KENALOG   Quantity:  15 g        Apply to the left arm rash 3 times a day   Refills:  1        * Vinyl Gloves Misc   Dose:  1 Box   Quantity:  50 each        1 Box as needed   Refills:  0        * NITRILE GLOVES MEDIUM Misc   Quantity:  100 each        USE EVERY DAY AS NEEDED *** MUST MAKE APPT. BEFORE ANY ADDITIONAL REFILLS**   Refills:  1        * Notice:  This list has 2 medication(s) that are the same as other medications prescribed for you. Read the directions carefully, and ask  your doctor or other care provider to review them with you.            Prescriptions were sent or printed at these locations (2 Prescriptions)                   [facility]   [address] [address]    Telephone:  [phone]   Fax:  [phone]   Hours:                  These medications are not ready yet because we are checking if your insurance will help you pay for them. Call your pharmacy to confirm that your medication is ready for pickup. It may take up to 24 hours for them to receive the prescription. If the prescription is not ready within 3 business days, please contact your clinic or your provider (2 of 2)         nitroFURantoin, macrocrystal-monohydrate, (MACROBID) 100 MG capsule               phenazopyridine (PYRIDIUM) 200 MG tablet                Procedures and tests performed during your visit     Bladder scan      Orders Needing Specimen Collection     None      Pending Results     Date and Time Order Name Status Description    10/20/2017 1344 URINE CULTURE AEROBIC BACTERIAL In process             Pending Culture Results     Date and Time Order Name Status Description    10/20/2017 1344 URINE CULTURE AEROBIC BACTERIAL In process             Pending Results Instructions     If you had any lab results that were not finalized at the time of your Discharge, you can call the ED Lab Result RN at [phone]. You will be contacted by this team for any positive Lab results or changes in treatment. The nurses are available 7 days a week from 10A to 6:30P.  You can leave a message 24 hours per day and they will return your call.        Thank you for choosing [facility]       Thank you for choosing [facility] for your care. Our goal is always to provide you with excellent care. Hearing back from our patients is one way we can continue to improve our services. Please take a few minutes to complete the written survey that you may receive in the mail after you visit  "with us. Thank you!        [facility] Information     [facility] lets you send messages to your doctor, view your test results, renew your prescriptions, schedule appointments and more. To sign up, go to [email or web address] . Click on \"Log in\" on the left side of the screen, which will take you to the Welcome page. Then click on \"Sign up Now\" on the right side of the page.     You will be asked to enter the access code listed below, as well as some personal information. Please follow the directions to create your username and password.     Your access code is: [email or web address]  Expires: 2017  3:58 PM     Your access code will  in 90 days. If you need help or a new code, please call your [place] clinic or [phone].        Care EveryWhere ID     This is your Care EveryWhere ID. This could be used by other organizations to access your [place] medical records  [id number]        Equal Access to Services     [facility] : Hadii rodo mackey hadasho Sodollyali, waaxda luqadaha, qaybta kaalmada adeegyaed, pema lr . So [facility] [phone].    ATENCIÓN: Si habla español, tiene a parker disposición servicios gratuitos de asistencia lingüística. Llame al [phone].    We comply with applicable federal civil rights laws and [place] laws. We do not discriminate on the basis of race, color, national origin, age, disability, sex, sexual orientation, or gender identity.            After Visit Summary       This is your record. Keep this with you and show to your community pharmacist(s) and doctor(s) at your next visit.                  "

## 2017-10-21 NOTE — ED PROVIDER NOTES
History     Chief Complaint   Patient presents with     UTI     HPI  Gemini Mccabe is a 31 year old female who presents for evaluation of possible UTI. She is brought here by 2 of her group home staff. The patient is nonverbal. Staff has noted an abnormally strong smelling urine, darker colored urine, increased behavioral abnormalities, and possible dysuria. The patient does not want to urinate apparently. They state that this is her typical UTI symptoms. 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 They deny any fevers, chills, nausea, vomiting, or abdominal pain. No bowel irregularities recently.  She had a urinalysis done in the clinic today, but the treating provider left the clinic prior to calling the group home with a result to initiate treatment.  Group home staff state that she has not urinated since 06 100 this morning. She has drank approximately 40-48 ounces of coffee/water today.        Problem List:    Patient Active Problem List    Diagnosis Date Noted     Pneumonia 11/12/2010     Priority: High     UTI (urinary tract infection) 05/27/2011     Priority: Medium     seems to be resolved.  suspected renal calculi       CARDIOVASCULAR SCREENING; LDL GOAL LESS THAN 160 10/31/2010     Priority: Medium     Chronic UTI 01/16/2010     Priority: Medium     Active autistic disorder      Priority: Medium     Problem list name updated by automated process. Provider to review       Mental retardation 11/10/2003     Priority: Medium     Problem list name updated by automated process. Provider to review       Epilepsy (H) 11/10/2003     Priority: Medium     Problem list name updated by automated process. Provider to review       Absence of menstruation 11/10/2003     Priority: Medium     Constipation 11/10/2003     Priority: Medium     Problem list name updated by automated process. Provider to review          Past Medical History:    Past Medical History:   Diagnosis Date     Absence of  menstruation      Autistic disorder, current or active state      Chronic UTI      Other specified congenital anomaly of kidney      Unspecified constipation      Unspecified epilepsy without mention of intractable epilepsy      Unspecified intellectual disabilities        Past Surgical History:    Past Surgical History:   Procedure Laterality Date     C REIMPLANT URETER,SINGLE URETER  1985       Family History:    Family History   Problem Relation Age of Onset     HEART DISEASE Father      MI at age 59     Connective Tissue Disorder Mother      lupus     HEART DISEASE Other      Grandfather       Social History:  Marital Status:  Single [1]  Social History   Substance Use Topics     Smoking status: Never Smoker     Smokeless tobacco: Never Used     Alcohol use No        Medications:      nitroFURantoin, macrocrystal-monohydrate, (MACROBID) 100 MG capsule   phenazopyridine (PYRIDIUM) 200 MG tablet   ascorbic acid (VITAMIN C) 500 MG tablet   medroxyPROGESTERone (DEPO-PROVERA) 150 MG/ML injection   CHILDRENS IBUPROFEN 100 MG/5ML suspension   multivitamin, therapeutic with minerals (MULTI-VITAMIN) TABS   citalopram (CELEXA) 20 MG tablet   polyethylene glycol (MIRALAX/GLYCOLAX) powder   QUEtiapine Fumarate (SEROQUEL PO)   RISPERIDONE PO   Disposable Gloves (NITRILE GLOVES MEDIUM) MISC   Disposable Gloves (VINYL GLOVES) MISC   docusate (COLACE) 50 MG/5ML liquid   LORAZEPAM PO   triamcinolone (KENALOG) 0.1 % cream   Salicylic Acid 40 % PADS   CRANBERRY FRUIT PO   BUTT PASTE - REGULAR (DR LOVE POOP GOOP BUTT PASTE FORMULA)   Nebulizer nebulization         Review of Systems   All other systems reviewed and are negative.      Physical Exam   BP: (!) 121/100  Pulse: 89  Temp: 97.5  F (36.4  C)  Resp: 20  Weight: 79.1 kg (174 lb 4.8 oz)      Physical Exam  Generally healthy appearing female in NAD who is active and non-toxic appearing.   Skin:  No rashes or lesions are noted on inspection of the torso, face, and upper  extremities.   Heart:  RRR with normal S1 and S2.  No S3 or S4.  No murmur, rub, gallop, or click.  PMI is nondisplaced.   Lungs:  CTA bilaterally without wheezes, rales, or rhonchi.  Good breath sounds heard throughout all lung fields.  Tympanitic to percussion with no areas of dullness.   Abdomen: Positive bowel sounds in all four quadrants.  No tenderness to palpation throughout.  No rebound and no guarding.  No hepatosplenomegaly.  No masses.  No flank percussive tenderness.    ED Course     ED Course     Procedures               Critical Care time:  none               Labs Ordered and Resulted from Time of ED Arrival Up to the Time of Departure from the ED - No data to display    Assessments & Plan (with Medical Decision Making)  Urinary tract infection without hematuria, site unspecified     31 year old female with a history of moderate MR and nonverbal status who presents for evaluation of a possible UTI. 2 days of dysuria, strong odiferous urine, dark colored urine, EKG-year-old changes, and bathroom avoidance.  Group home staff deny nausea, vomiting, fevers, chills, or decreased p.o. intake. On exam her vital signs show blood pressure 121/100, pulse 89, temperature 97.5. Normal abdominal exam. No flank percussive tenderness. Bladder scan showed only 370 mL in the bladder. No evidence for urinary retention. She likely does not want to go to the bathroom due to the dysuria. Therefore we are going to prescribe kite radium. Antibiotics in the form of Macrobid per orders. Increase clear fluids. Follow-up if symptoms change or worsening. The patient and group home staff were in agreement with this plan and the patient was suitable for discharge.     I have reviewed the nursing notes.    I have reviewed the findings, diagnosis, plan and need for follow up with the patient.       Discharge Medication List as of 10/20/2017  9:03 PM      START taking these medications    Details   nitroFURantoin,  macrocrystal-monohydrate, (MACROBID) 100 MG capsule Take 1 capsule (100 mg) by mouth 2 times daily, Disp-14 capsule, R-0, E-Prescribe      phenazopyridine (PYRIDIUM) 200 MG tablet Take 1 tablet (200 mg) by mouth 3 times daily as needed for irritation, Disp-9 tablet, R-0, E-Prescribe             Final diagnoses:   Urinary tract infection without hematuria, site unspecified       Disclaimer: This note consists of symbols derived from keyboarding, dictation and/or voice recognition software. As a result, there may be errors in the script that have gone undetected. Please consider this when interpreting information found in this chart.      10/20/2017   Davis Anderson PA-C   [facility] EMERGENCY DEPARTMENT     Davis Anderson PA-C  10/21/17 0111

## 2017-10-21 NOTE — DISCHARGE INSTRUCTIONS
Urinary Tract Infections in Women    Urinary tract infections (UTIs) are most often caused by bacteria (germs). These bacteria enter the urinary tract. The bacteria may come from outside the body. Or they may travel from the skin outside the rectum or vagina into the urethra. Female anatomy makes it easy for bacteria from the bowel to enter a woman s urinary tract, which is the most common source of UTI. This means women develop UTIs more often than men. Pain in or around the urinary tract is a common UTI symptom. But the only way to know for sure if you have a UTI for the healthcare provider to test your urine. The two tests that may be done are the urinalysis and urine culture.  Types of UTIs    Cystitis: A bladder infection (cystitis) is the most common UTI in women. You may have urgent or frequent urination. You may also have pain, burning when you urinate, and bloody urine.    Urethritis: This is an inflamed urethra, which is the tube that carries urine from the bladder to outside the body. You may have lower stomach or back pain. You may also have urgent or frequent urination.    Pyelonephritis: This is a kidney infection. If not treated, it can be serious and damage your kidneys. In severe cases, you may be hospitalized. You may have a fever and lower back pain.  Medicines to treat a UTI  Most UTIs are treated with antibiotics. These kill the bacteria. The length of time you need to take them depends on the type of infection. It may be as short as 3 days. If you have repeated UTIs, a low-dose antibiotic may be needed for several months. Take antibiotics exactly as directed. Don t stop taking them until all of the medicine is gone. If you stop taking the antibiotic too soon, the infection may not go away, and you may develop a resistance to the antibiotic. This can make it much harder to treat.  Lifestyle changes to treat and prevent UTIs  The lifestyle changes below will help get rid of your UTI. They may  also help prevent future UTIs.    Drink plenty of fluids. This includes water, juice, or other caffeine-free drinks. Fluids help flush bacteria out of your body.    Empty your bladder. Always empty your bladder when you feel the urge to urinate. And always urinate before going to sleep. Urine that stays in your bladder can lead to infection. Try to urinate before and after sex as well.    Practice good personal hygiene. Wipe yourself from front to back after using the toilet. This helps keep bacteria from getting into the urethra.    Use condoms during sex. These help prevent UTIs caused by sexually transmitted bacteria. Also, avoid using spermicides during sex. These can increase the risk of UTIs. Choose other forms of birth control instead. For women who tend to get UTIs after sex, a low-dose of a preventive antibiotic may be used. Be sure to discuss this option with your healthcare provider.    Follow up with your healthcare provider as directed. He or she may test to make sure the infection has cleared. If needed, more treatment may be started.  Date Last Reviewed: 1/1/2017 2000-2017 [employer]. [address] [place] [address]. All rights reserved. This information is not intended as a substitute for professional medical care. Always follow your healthcare professional's instructions.

## 2017-10-21 NOTE — ED NOTES
Pt presents with dysuria and behavior changes for 4 days.  She lives in a group home, is non-verbal and staff reports is prone to UTIs. She had a UA done today, clinic closed prior to treatment. Staff reports that although she is taking oral fluids, she has not urinated since 0600 today.

## 2017-10-21 NOTE — ED NOTES
Pt anxious waiting in the room.  Requested to go to the lobby about 10 minutes ago.  Discharge instructions reviewed with caregiver.

## 2017-10-21 NOTE — ED NOTES
Pt has had urine symptoms for the last 4 days.  Painful urination, pt has had decreased urination due to the pain.  Pt was seen in clinic today and a urine sample was collected.  Urine was resulted awaiting culture per caregivers. Pt and her care giver's are here tonight requesting medication as she can not wait until Monday when the clinic is open again.

## 2017-10-22 LAB
BACTERIA SPEC CULT: ABNORMAL
Lab: ABNORMAL
SPECIMEN SOURCE: ABNORMAL

## 2017-12-11 ENCOUNTER — ALLIED HEALTH/NURSE VISIT (OUTPATIENT)
Dept: FAMILY MEDICINE | Facility: OTHER | Age: 32
End: 2017-12-11
Payer: MEDICARE

## 2017-12-11 DIAGNOSIS — N91.2 ABSENCE OF MENSTRUATION: Primary | ICD-10-CM

## 2017-12-11 PROCEDURE — 96372 THER/PROPH/DIAG INJ SC/IM: CPT

## 2017-12-11 NOTE — NURSING NOTE
The following medication was given:     MEDICATION: Medroxyprogesterone 150 mg  See medication note.  Patient instructed to remain in clinic for 20 minutes afterwards, and to report any adverse reaction to me immediately.  Prior to injection verified patient identity using patient's name and date of birth.  Sonya Lara MA     12/11/2017

## 2017-12-11 NOTE — MR AVS SNAPSHOT
"              After Visit Summary   12/11/2017    Gemini Mccabe    MRN: [id number]           Patient Information     Date Of Birth          1985        Visit Information        Provider Department      12/11/2017 4:00 PM LIZETH ALLEN NURSE, [facility] ([facility]        Today's Diagnoses     Absence of menstruation    -  1       Follow-ups after your visit        Your next 10 appointments already scheduled     Dec 11, 2017  4:00 PM CST   Nurse Only with LIZETH ALLEN NURSE, [facility] ([facility] ([facility])    [address] [facility], located within [facility] [address] [phone]              Who to contact     If you have questions or need follow up information about today's clinic visit or your schedule please contact [facility] directly at [phone].  Normal or non-critical lab and imaging results will be communicated to you by MyChart, letter or phone within 4 business days after the clinic has received the results. If you do not hear from us within 7 days, please contact the clinic through MyChart or phone. If you have a critical or abnormal lab result, we will notify you by phone as soon as possible.  Submit refill requests through [employer] or call your pharmacy and they will forward the refill request to us. Please allow 3 business days for your refill to be completed.          Additional Information About Your Visit        MyChart Information     [employer] lets you send messages to your doctor, view your test results, renew your prescriptions, schedule appointments and more. To sign up, go to [email or web address] . Click on \"Log in\" on the left side of the screen, which will take you to the Welcome page. Then click on \"Sign up Now\" on the right side of the page.     You will be asked to enter the access code listed below, as well as some personal information. Please follow the directions to create your username and password.     Your access code is: [email or web address]  Expires: 12/24/2017 "  2:58 PM     Your access code will  in 90 days. If you need help or a new code, please call your [place] clinic or [phone].        Care EveryWhere ID     This is your Care EveryWhere ID. This could be used by other organizations to access your [place] medical records  [id number]         Blood Pressure from Last 3 Encounters:   10/20/17 (!) 121/100   03/15/17 124/80   17 122/78    Weight from Last 3 Encounters:   10/20/17 174 lb 4.8 oz (79.1 kg)   17 173 lb 14.4 oz (78.9 kg)   16 160 lb (72.6 kg)              We Performed the Following     INJECTION INTRAMUSCULAR OR SUB-Q     MEDROXYPROGESTERONE INJ          Today's Medication Changes          These changes are accurate as of: 17  3:57 PM.  If you have any questions, ask your nurse or doctor.               These medicines have changed or have updated prescriptions.        Dose/Directions    citalopram 20 MG tablet   Commonly known as:  celeXA   This may have changed:  how much to take   Used for:  Autistic disorder, current or active state        Dose:  20 mg   Take 1 tablet by mouth daily.   Quantity:  90 tablet   Refills:  3                Primary Care Provider Office Phone # Fax #    Maggie Coker [phone]       [facility] [address] [id number]        Equal Access to Services     KAYCEE AGUILA AH: Hadporter shaikho Somelisa, waaxda luqadaha, qaybta kaalmada maeve, pema lr . So [facility] [phone].    ATENCIÓN: Si habla español, tiene a pakrer disposición servicios gratuitos de asistencia lingüística. Llagatha al [phone].    We comply with applicable federal civil rights laws and [place] laws. We do not discriminate on the basis of race, color, national origin, age, disability, sex, sexual orientation, or gender identity.            Thank you!     Thank you for choosing [facility]  for your care. Our goal is always to provide  you with excellent care. Hearing back from our patients is one way we can continue to improve our services. Please take a few minutes to complete the written survey that you may receive in the mail after your visit with us. Thank you!             Your Updated Medication List - Protect others around you: Learn how to safely use, store and throw away your medicines at www.disposemymeds.org.          This list is accurate as of: 12/11/17  3:57 PM.  Always use your most recent med list.                   Brand Name Dispense Instructions for use Diagnosis    ascorbic acid 500 MG tablet    VITAMIN C    30 tablet    Take 1 tablet (500 mg) by mouth daily    Mental retardation, Active autistic disorder       BUTT PASTE - REGULAR    DR LOVE POOP GOOP BUTT PASTE FORMULA    60 oz    Apply  topically See Admin Instructions. With each change of adult diaper.    Diaper rash       CHILDRENS IBUPROFEN 100 MG/5ML suspension   Generic drug:  ibuprofen     480 mL    TAKE 30 MLS BY MOUTH EVERY 6 HOURS AS NEEDED FOR FEVER.    Chronic UTI       citalopram 20 MG tablet    celeXA    90 tablet    Take 1 tablet by mouth daily.    Autistic disorder, current or active state       CRANBERRY FRUIT PO      Take  by mouth. 500 mg, 1 capsule daily        DEPO-PROVERA 150 MG/ML injection   Generic drug:  medroxyPROGESTERone     1 mL    Inject 1 mL (150 mg) into the muscle every 3 months Per verbal order of Johny Macedo PA-C for injections through 3/15/2018/ace    Mental retardation       docusate 50 MG/5ML liquid    COLACE     Take 100 mg by mouth daily.        LORAZEPAM PO      Take 0.5 mg by mouth 2 times daily as needed Not to exceed 2 tabs in 24 hours        multivitamin, therapeutic with minerals Tabs tablet     100 tablet    Take 1 tablet by mouth daily    Unspecified intellectual disabilities, Autistic disorder, current or active state       nebulizer nebulization     1 Device    4 times daily. Use 4 times a day as instructed    Pneumonia        nitroFURantoin (macrocrystal-monohydrate) 100 MG capsule    MACROBID    14 capsule    Take 1 capsule (100 mg) by mouth 2 times daily        polyethylene glycol powder    MIRALAX/GLYCOLAX     Take 1 capful by mouth daily as needed.        RISPERIDONE PO      Take 0.25 mg by mouth daily        Salicylic Acid 40 % Pads     3 each    Externally apply 1 each topically. As directed    Plantar warts       SEROQUEL PO      Take by mouth daily 100 mg in the morning and 300 mg at bedtime        triamcinolone 0.1 % cream    KENALOG    15 g    Apply to the left arm rash 3 times a day        * Vinyl Gloves Misc     50 each    1 Box as needed    Constipation, unspecified constipation type, Active autistic disorder       * NITRILE GLOVES MEDIUM Misc     100 each    USE EVERY DAY AS NEEDED *** MUST MAKE APPT. BEFORE ANY ADDITIONAL REFILLS**    Active autistic disorder, Constipation, unspecified constipation type       * Notice:  This list has 2 medication(s) that are the same as other medications prescribed for you. Read the directions carefully, and ask your doctor or other care provider to review them with you.

## 2018-01-11 DIAGNOSIS — N39.0 RECURRENT URINARY TRACT INFECTION: ICD-10-CM

## 2018-01-11 LAB
ALBUMIN UR-MCNC: NEGATIVE MG/DL
APPEARANCE UR: CLEAR
BILIRUB UR QL STRIP: NEGATIVE
COLOR UR AUTO: YELLOW
GLUCOSE UR STRIP-MCNC: NEGATIVE MG/DL
HGB UR QL STRIP: NEGATIVE
KETONES UR STRIP-MCNC: ABNORMAL MG/DL
LEUKOCYTE ESTERASE UR QL STRIP: NEGATIVE
NITRATE UR QL: NEGATIVE
PH UR STRIP: 6.5 PH (ref 5–7)
SOURCE: ABNORMAL
SP GR UR STRIP: >1.03 (ref 1–1.03)
UROBILINOGEN UR STRIP-ACNC: 0.2 EU/DL (ref 0.2–1)

## 2018-01-11 PROCEDURE — 87086 URINE CULTURE/COLONY COUNT: CPT | Performed by: FAMILY MEDICINE

## 2018-01-11 PROCEDURE — 81003 URINALYSIS AUTO W/O SCOPE: CPT | Performed by: FAMILY MEDICINE

## 2018-01-11 PROCEDURE — 87088 URINE BACTERIA CULTURE: CPT | Performed by: FAMILY MEDICINE

## 2018-01-11 PROCEDURE — 87186 SC STD MICRODIL/AGAR DIL: CPT | Performed by: FAMILY MEDICINE

## 2018-01-13 LAB
BACTERIA SPEC CULT: ABNORMAL
Lab: ABNORMAL
SPECIMEN SOURCE: ABNORMAL

## 2018-02-23 DIAGNOSIS — N39.0 RECURRENT URINARY TRACT INFECTION: ICD-10-CM

## 2018-02-23 LAB
ALBUMIN UR-MCNC: NEGATIVE MG/DL
APPEARANCE UR: ABNORMAL
BACTERIA #/AREA URNS HPF: ABNORMAL /HPF
BILIRUB UR QL STRIP: NEGATIVE
COLOR UR AUTO: YELLOW
GLUCOSE UR STRIP-MCNC: NEGATIVE MG/DL
HGB UR QL STRIP: NEGATIVE
KETONES UR STRIP-MCNC: NEGATIVE MG/DL
LEUKOCYTE ESTERASE UR QL STRIP: ABNORMAL
MUCOUS THREADS #/AREA URNS LPF: PRESENT /LPF
NITRATE UR QL: NEGATIVE
PH UR STRIP: 6 PH (ref 5–7)
RBC #/AREA URNS AUTO: <1 /HPF (ref 0–2)
SOURCE: ABNORMAL
SP GR UR STRIP: 1.01 (ref 1–1.03)
SQUAMOUS #/AREA URNS AUTO: 1 /HPF (ref 0–1)
UROBILINOGEN UR STRIP-MCNC: 0 MG/DL (ref 0–2)
WBC #/AREA URNS AUTO: 10 /HPF (ref 0–2)

## 2018-02-23 PROCEDURE — 81001 URINALYSIS AUTO W/SCOPE: CPT | Performed by: FAMILY MEDICINE

## 2018-02-23 PROCEDURE — 87086 URINE CULTURE/COLONY COUNT: CPT | Performed by: FAMILY MEDICINE

## 2018-02-23 PROCEDURE — 87088 URINE BACTERIA CULTURE: CPT | Performed by: FAMILY MEDICINE

## 2018-02-23 PROCEDURE — 87186 SC STD MICRODIL/AGAR DIL: CPT | Performed by: FAMILY MEDICINE

## 2018-02-26 ENCOUNTER — ALLIED HEALTH/NURSE VISIT (OUTPATIENT)
Dept: FAMILY MEDICINE | Facility: OTHER | Age: 33
End: 2018-02-26
Payer: MEDICARE

## 2018-02-26 DIAGNOSIS — N91.2 ABSENCE OF MENSTRUATION: Primary | ICD-10-CM

## 2018-02-26 LAB
BACTERIA SPEC CULT: ABNORMAL
Lab: ABNORMAL
SPECIMEN SOURCE: ABNORMAL

## 2018-02-26 PROCEDURE — 96372 THER/PROPH/DIAG INJ SC/IM: CPT

## 2018-02-26 NOTE — NURSING NOTE
The following medication was given:     MEDICATION: Medroxyprogesterone 150 mg  See medication note.  Patient instructed to remain in clinic for 20 minutes afterwards, and to report any adverse reaction to me immediately.  Prior to injection verified patient identity using patient's name and date of birth.  Sonya Lara MA     2/26/2018

## 2018-02-26 NOTE — MR AVS SNAPSHOT
"              After Visit Summary   2/26/2018    Gemini Mccabe    MRN: [id number]           Patient Information     Date Of Birth          1985        Visit Information        Provider Department      2/26/2018 4:15 PM [facility]        Today's Diagnoses     Absence of menstruation    -  1       Follow-ups after your visit        Your next 10 appointments already scheduled     Feb 26, 2018  4:15 PM CST   Nurse Only with [facility] ([facility])    [address] [facility] [address] [phone]              Who to contact     If you have questions or need follow up information about today's clinic visit or your schedule please contact [facility] directly at [phone].  Normal or non-critical lab and imaging results will be communicated to you by [employer], letter or phone within 4 business days after the clinic has received the results. If you do not hear from us within 7 days, please contact the clinic through [employer] or phone. If you have a critical or abnormal lab result, we will notify you by phone as soon as possible.  Submit refill requests through [employer] or call your pharmacy and they will forward the refill request to us. Please allow 3 business days for your refill to be completed.          Additional Information About Your Visit        [employer] Information     [employer] lets you send messages to your doctor, view your test results, renew your prescriptions, schedule appointments and more. To sign up, go to [email or web address] . Click on \"Log in\" on the left side of the screen, which will take you to the Welcome page. Then click on \"Sign up Now\" on the right side of the page.     You will be asked to enter the access code listed below, as well as some personal information. Please follow the directions to create your username and password.     Your access code is: [email or web address]  Expires: 5/27/2018  3:57 PM     Your " access code will  in 90 days. If you need help or a new code, please call your [place] clinic or [phone].        Care EveryWhere ID     This is your Care EveryWhere ID. This could be used by other organizations to access your [place] medical records  [id number]         Blood Pressure from Last 3 Encounters:   10/20/17 (!) 121/100   03/15/17 124/80   17 122/78    Weight from Last 3 Encounters:   10/20/17 174 lb 4.8 oz (79.1 kg)   17 173 lb 14.4 oz (78.9 kg)   16 160 lb (72.6 kg)              We Performed the Following     INJECTION INTRAMUSCULAR OR SUB-Q     MEDROXYPROGESTERONE INJ          Today's Medication Changes          These changes are accurate as of 18  3:57 PM.  If you have any questions, ask your nurse or doctor.               These medicines have changed or have updated prescriptions.        Dose/Directions    citalopram 20 MG tablet   Commonly known as:  celeXA   This may have changed:  how much to take   Used for:  Autistic disorder, current or active state        Dose:  20 mg   Take 1 tablet by mouth daily.   Quantity:  90 tablet   Refills:  3                Primary Care Provider Office Phone # Fax #    Maggie Coker [phone]       [facility] [address] [facility] [id number]        Equal Access to Services     KAYCEE AGUILA AH: Hadii rodo mackey hadasho Soomaali, waaxda luqadaha, qaybta kaalmada adebogdanda, pema adam. So [facility] [phone].    ATENCIÓN: Si habla español, tiene a parker disposición servicios gratuitos de asistencia lingüística. Llame al [phone].    We comply with applicable federal civil rights laws and [place] laws. We do not discriminate on the basis of race, color, national origin, age, disability, sex, sexual orientation, or gender identity.            Thank you!     Thank you for choosing [facility]  for your care. Our goal is always to provide you with excellent  care. Hearing back from our patients is one way we can continue to improve our services. Please take a few minutes to complete the written survey that you may receive in the mail after your visit with us. Thank you!             Your Updated Medication List - Protect others around you: Learn how to safely use, store and throw away your medicines at www.disposemymeds.org.          This list is accurate as of 2/26/18  3:57 PM.  Always use your most recent med list.                   Brand Name Dispense Instructions for use Diagnosis    ascorbic acid 500 MG tablet    VITAMIN C    30 tablet    Take 1 tablet (500 mg) by mouth daily    Mental retardation, Active autistic disorder       BUTT PASTE - REGULAR    DR LOVE POOP GOOP BUTT PASTE FORMULA    60 oz    Apply  topically See Admin Instructions. With each change of adult diaper.    Diaper rash       CHILDRENS IBUPROFEN 100 MG/5ML suspension   Generic drug:  ibuprofen     480 mL    TAKE 30 MLS BY MOUTH EVERY 6 HOURS AS NEEDED FOR FEVER.    Chronic UTI       citalopram 20 MG tablet    celeXA    90 tablet    Take 1 tablet by mouth daily.    Autistic disorder, current or active state       CRANBERRY FRUIT PO      Take  by mouth. 500 mg, 1 capsule daily        DEPO-PROVERA 150 MG/ML injection   Generic drug:  medroxyPROGESTERone     1 mL    Inject 1 mL (150 mg) into the muscle every 3 months Per verbal order of Johny Macedo PA-C for injections through 3/15/2018/ace    Mental retardation       docusate 50 MG/5ML liquid    COLACE     Take 100 mg by mouth daily.        LORAZEPAM PO      Take 0.5 mg by mouth 2 times daily as needed Not to exceed 2 tabs in 24 hours        multivitamin, therapeutic with minerals Tabs tablet     100 tablet    Take 1 tablet by mouth daily    Unspecified intellectual disabilities, Autistic disorder, current or active state       nebulizer nebulization     1 Device    4 times daily. Use 4 times a day as instructed    Pneumonia       nitroFURantoin  (macrocrystal-monohydrate) 100 MG capsule    MACROBID    14 capsule    Take 1 capsule (100 mg) by mouth 2 times daily        polyethylene glycol powder    MIRALAX/GLYCOLAX     Take 1 capful by mouth daily as needed.        RISPERIDONE PO      Take 0.25 mg by mouth daily        Salicylic Acid 40 % Pads     3 each    Externally apply 1 each topically. As directed    Plantar warts       SEROQUEL PO      Take by mouth daily 100 mg in the morning and 300 mg at bedtime        triamcinolone 0.1 % cream    KENALOG    15 g    Apply to the left arm rash 3 times a day        * Vinyl Gloves Misc     50 each    1 Box as needed    Constipation, unspecified constipation type, Active autistic disorder       * NITRILE GLOVES MEDIUM Misc     100 each    USE EVERY DAY AS NEEDED *** MUST MAKE APPT. BEFORE ANY ADDITIONAL REFILLS**    Active autistic disorder, Constipation, unspecified constipation type       * Notice:  This list has 2 medication(s) that are the same as other medications prescribed for you. Read the directions carefully, and ask your doctor or other care provider to review them with you.

## 2018-03-08 ENCOUNTER — APPOINTMENT (OUTPATIENT)
Dept: GENERAL RADIOLOGY | Facility: CLINIC | Age: 33
End: 2018-03-08
Attending: EMERGENCY MEDICINE
Payer: MEDICARE

## 2018-03-08 ENCOUNTER — HOSPITAL ENCOUNTER (EMERGENCY)
Facility: CLINIC | Age: 33
Discharge: HOME | End: 2018-03-08
Attending: EMERGENCY MEDICINE | Admitting: EMERGENCY MEDICINE
Payer: MEDICARE

## 2018-03-08 ENCOUNTER — NURSE TRIAGE (OUTPATIENT)
Dept: NURSING | Facility: CLINIC | Age: 33
End: 2018-03-08

## 2018-03-08 VITALS
OXYGEN SATURATION: 97 % | HEART RATE: 75 BPM | TEMPERATURE: 98.6 F | WEIGHT: 170 LBS | BODY MASS INDEX: 34.34 KG/M2 | RESPIRATION RATE: 16 BRPM

## 2018-03-08 DIAGNOSIS — S62.606A CLOSED NONDISPLACED FRACTURE OF PHALANX OF RIGHT LITTLE FINGER, UNSPECIFIED PHALANX, INITIAL ENCOUNTER: ICD-10-CM

## 2018-03-08 PROCEDURE — 99284 EMERGENCY DEPT VISIT MOD MDM: CPT | Performed by: EMERGENCY MEDICINE

## 2018-03-08 PROCEDURE — 29130 APPL FINGER SPLINT STATIC: CPT | Mod: F9 | Performed by: EMERGENCY MEDICINE

## 2018-03-08 PROCEDURE — 99283 EMERGENCY DEPT VISIT LOW MDM: CPT | Mod: Z6 | Performed by: EMERGENCY MEDICINE

## 2018-03-08 PROCEDURE — 73140 X-RAY EXAM OF FINGER(S): CPT | Mod: TC,RT

## 2018-03-08 NOTE — DISCHARGE INSTRUCTIONS
Finger Fracture, Closed  You have a broken finger (fracture). This causes local pain, swelling, and bruising. This injury usually takes about 4 to 6 weeks to heal, but can take longer in some cases. Finger injuries are often treated with a splint or cast, or by taping the injured finger to the next one (mikael taping). This protects the injured finger and holds the bone in position while it heals. More serious fractures may need surgery.     If the fingernail has been severely injured, it will probably fall off in 1 to 2 weeks. A new fingernail will usually start to grow back within a month.  Home care  Follow these guidelines when caring for yourself at home:    Keep your hand elevated to reduce pain and swelling. When sitting or lying down keep your arm above the level of your heart. You can do this by placing your arm on a pillow that rests on your chest or on a pillow at your side. This is most important during the first 2 days (48 hours) after the injury.    Put an ice pack on the injured area. Do this for 20 minutes every 1 to 2 hours the first day for pain relief. You can make an ice pack by wrapping a plastic bag of ice cubes in a thin towel. As the ice melts, be careful that the cast or splint doesn t get wet. Continue using the ice pack 3 to 4 times a day until the pain and swelling go away.    Keep the cast or splint completely dry at all times. Bathe with your cast or splint out of the water. Protect it with a large plastic bag, rubber-banded at the top end. If a fiberglass cast or splint gets wet, you can dry it with a hair dryer.    If mikael tape was put on and it becomes wet or dirty, change it. You may replace it with paper, plastic, or cloth tape. Cloth tape and paper tapes must be kept dry. Keep the mikael tape in place for at least 4 weeks.    You may use acetaminophen or ibuprofen to control pain, unless another pain medicine was prescribed. If you have chronic liver or kidney disease, talk with  your healthcare provider before using these medicines. Also talk with your provider if you ve had a stomach ulcer or gastrointestinal bleeding.    Don t put creams or objects under the cast if you have itching.  Follow-up care  Follow up with your healthcare provider, or as advised. This is to make sure the bone is healing the way it should.  X-rays may be taken. You will be told of any new findings that may affect your care.  When to seek medical advice  Call your healthcare provider right away if any of these occur:    The plaster cast or splint becomes wet or soft    The cast or splint cracks    The fiberglass cast or splint stays wet for more than 24 hours    Pain or swelling gets worse    Redness, warmth, swelling, drainage from the wound, or foul odor from a cast or splint    Finger becomes more cold, blue, numb, or tingly    You can t move your finger    The skin around the cast or splint becomes red    Fever of 100.4 F (38 C) or higher, or as directed by your healthcare provider  Date Last Reviewed: 2/1/2017 2000-2017 [employer]. [address] [place] [address]. All rights reserved. This information is not intended as a substitute for professional medical care. Always follow your healthcare professional's instructions.

## 2018-03-08 NOTE — ED AVS SNAPSHOT
[facility] Emergency Department    [address]     [place] [address]    Phone:  [phone]    Fax:  [phone]                                       Gemini Mccabe   MRN: [id number]    Department:  [facility] Emergency Department   Date of Visit:  3/8/2018           Patient Information     Date Of Birth          1985        Your diagnoses for this visit were:     Closed nondisplaced fracture of phalanx of right little finger, unspecified phalanx, initial encounter        You were seen by Luigi Neal MD.      Follow-up Information     Follow up with Maggie Coker In 1 week.    Specialty:  Family Practice    Contact information:    [facility]  [address] [address]  [phone]          Discharge Instructions         Finger Fracture, Closed  You have a broken finger (fracture). This causes local pain, swelling, and bruising. This injury usually takes about 4 to 6 weeks to heal, but can take longer in some cases. Finger injuries are often treated with a splint or cast, or by taping the injured finger to the next one (mikael taping). This protects the injured finger and holds the bone in position while it heals. More serious fractures may need surgery.     If the fingernail has been severely injured, it will probably fall off in 1 to 2 weeks. A new fingernail will usually start to grow back within a month.  Home care  Follow these guidelines when caring for yourself at home:    Keep your hand elevated to reduce pain and swelling. When sitting or lying down keep your arm above the level of your heart. You can do this by placing your arm on a pillow that rests on your chest or on a pillow at your side. This is most important during the first 2 days (48 hours) after the injury.    Put an ice pack on the injured area. Do this for 20 minutes every 1 to 2 hours the first day for pain relief. You can make an ice pack by wrapping a plastic bag  of ice cubes in a thin towel. As the ice melts, be careful that the cast or splint doesn t get wet. Continue using the ice pack 3 to 4 times a day until the pain and swelling go away.    Keep the cast or splint completely dry at all times. Bathe with your cast or splint out of the water. Protect it with a large plastic bag, rubber-banded at the top end. If a fiberglass cast or splint gets wet, you can dry it with a hair dryer.    If mikael tape was put on and it becomes wet or dirty, change it. You may replace it with paper, plastic, or cloth tape. Cloth tape and paper tapes must be kept dry. Keep the mikael tape in place for at least 4 weeks.    You may use acetaminophen or ibuprofen to control pain, unless another pain medicine was prescribed. If you have chronic liver or kidney disease, talk with your healthcare provider before using these medicines. Also talk with your provider if you ve had a stomach ulcer or gastrointestinal bleeding.    Don t put creams or objects under the cast if you have itching.  Follow-up care  Follow up with your healthcare provider, or as advised. This is to make sure the bone is healing the way it should.  X-rays may be taken. You will be told of any new findings that may affect your care.  When to seek medical advice  Call your healthcare provider right away if any of these occur:    The plaster cast or splint becomes wet or soft    The cast or splint cracks    The fiberglass cast or splint stays wet for more than 24 hours    Pain or swelling gets worse    Redness, warmth, swelling, drainage from the wound, or foul odor from a cast or splint    Finger becomes more cold, blue, numb, or tingly    You can t move your finger    The skin around the cast or splint becomes red    Fever of 100.4 F (38 C) or higher, or as directed by your healthcare provider  Date Last Reviewed: 2/1/2017 2000-2017 [employer]. [address] [place] [address], [place] [address]. All rights reserved. This  information is not intended as a substitute for professional medical care. Always follow your healthcare professional's instructions.          24 Hour Appointment Hotline       To make an appointment at any [facility], call [phone] ([phone]). If you don't have a family doctor or clinic, we will help you find one. [facility] are conveniently located to serve the needs of you and your family.             Review of your medicines      Our records show that you are taking the medicines listed below. If these are incorrect, please call your family doctor or clinic.        Dose / Directions Last dose taken    ascorbic acid 500 MG tablet   Commonly known as:  VITAMIN C   Dose:  500 mg   Quantity:  30 tablet        Take 1 tablet (500 mg) by mouth daily   Refills:  0        BUTT PASTE - REGULAR   Commonly known as:  DR JAX WATSONOP BUTT PASTE FORMULA   Quantity:  60 oz        Apply  topically See Admin Instructions. With each change of adult diaper.   Refills:  12        CHILDRENS IBUPROFEN 100 MG/5ML suspension   Quantity:  480 mL   Generic drug:  ibuprofen        TAKE 30 MLS BY MOUTH EVERY 6 HOURS AS NEEDED FOR FEVER.   Refills:  3        citalopram 20 MG tablet   Commonly known as:  celeXA   Dose:  20 mg   Quantity:  90 tablet        Take 1 tablet by mouth daily.   Refills:  3        CRANBERRY FRUIT PO        Take  by mouth. 500 mg, 1 capsule daily   Refills:  0        DEPO-PROVERA 150 MG/ML injection   Dose:  150 mg   Quantity:  1 mL   Generic drug:  medroxyPROGESTERone        Inject 1 mL (150 mg) into the muscle every 3 months Per verbal order of Johny Macedo PA-C for injections through 3/15/2018/ace   Refills:  3        docusate 50 MG/5ML liquid   Commonly known as:  COLACE   Dose:  100 mg        Take 100 mg by mouth daily.   Refills:  0        LORAZEPAM PO   Dose:  0.5 mg        Take 0.5 mg by mouth 2 times daily as needed Not to exceed 2 tabs in 24 hours   Refills:  0        multivitamin,  therapeutic with minerals Tabs tablet   Dose:  1 tablet   Quantity:  100 tablet        Take 1 tablet by mouth daily   Refills:  3        nebulizer nebulization   Quantity:  1 Device        4 times daily. Use 4 times a day as instructed   Refills:  0        nitroFURantoin (macrocrystal-monohydrate) 100 MG capsule   Commonly known as:  MACROBID   Dose:  100 mg   Quantity:  14 capsule        Take 1 capsule (100 mg) by mouth 2 times daily   Refills:  0        polyethylene glycol powder   Commonly known as:  MIRALAX/GLYCOLAX   Dose:  1 capful        Take 1 capful by mouth daily as needed.   Refills:  0        RISPERIDONE PO   Dose:  0.25 mg        Take 0.25 mg by mouth daily   Refills:  0        Salicylic Acid 40 % Pads   Dose:  1 each   Quantity:  3 each        Externally apply 1 each topically. As directed   Refills:  0        SEROQUEL PO        Take by mouth daily 100 mg in the morning and 300 mg at bedtime   Refills:  0        triamcinolone 0.1 % cream   Commonly known as:  KENALOG   Quantity:  15 g        Apply to the left arm rash 3 times a day   Refills:  1        * Vinyl Gloves Misc   Dose:  1 Box   Quantity:  50 each        1 Box as needed   Refills:  0        * NITRILE GLOVES MEDIUM Misc   Quantity:  100 each        USE EVERY DAY AS NEEDED *** MUST MAKE APPT. BEFORE ANY ADDITIONAL REFILLS**   Refills:  1        * Notice:  This list has 2 medication(s) that are the same as other medications prescribed for you. Read the directions carefully, and ask your doctor or other care provider to review them with you.            Procedures and tests performed during your visit     Fingers XR, 2-3 views, right      Orders Needing Specimen Collection     None      Pending Results     No orders found from 3/6/2018 to 3/9/2018.            Pending Culture Results     No orders found from 3/6/2018 to 3/9/2018.            Pending Results Instructions     If you had any lab results that were not finalized at the time of your  "Discharge, you can call the ED Lab Result RN at [phone]. You will be contacted by this team for any positive Lab results or changes in treatment. The nurses are available 7 days a week from 10A to 6:30P.  You can leave a message 24 hours per day and they will return your call.        Thank you for choosing [facility]       Thank you for choosing [facility] for your care. Our goal is always to provide you with excellent care. Hearing back from our patients is one way we can continue to improve our services. Please take a few minutes to complete the written survey that you may receive in the mail after you visit with us. Thank you!        [employer] Information     [employer] lets you send messages to your doctor, view your test results, renew your prescriptions, schedule appointments and more. To sign up, go to [email or web address] . Click on \"Log in\" on the left side of the screen, which will take you to the Welcome page. Then click on \"Sign up Now\" on the right side of the page.     You will be asked to enter the access code listed below, as well as some personal information. Please follow the directions to create your username and password.     Your access code is: [email or web address]  Expires: 2018  3:57 PM     Your access code will  in 90 days. If you need help or a new code, please call your [facility] clinic or [phone].        Care EveryWhere ID     This is your Care EveryWhere ID. This could be used by other organizations to access your [facility] medical records  [id number]        Equal Access to Services     [facility] CenterHEIDI AH: Hadii aad ku hadasho Soomaali, waaxda luqadaha, qaybta kaalmada adeegyada, pema lr . So [facility] [phone].    ATENCIÓN: Si habla español, tiene a parker disposición servicios gratuitos de asistencia lingüística. Llame al [phone].    We comply with applicable federal civil rights laws and [place] laws. We do not discriminate on the basis of race, " color, national origin, age, disability, sex, sexual orientation, or gender identity.            After Visit Summary       This is your record. Keep this with you and show to your community pharmacist(s) and doctor(s) at your next visit.

## 2018-03-08 NOTE — LETTER
March 8, 2018      To Whom It May Concern:      Gemini Mccabe was seen in our Emergency Department today, 03/08/18.  I expect her condition to improve over the next 4 weeks.  She may return to work/school tomorrow but must rest her right fifth finger.    Sincerely,        Luigi Neal MD

## 2018-03-08 NOTE — TELEPHONE ENCOUNTER
Reason for Disposition    Skin is split open or gaping  (or length > 1/2 inch or 12 mm)    Additional Information    Negative: [1] Major bleeding (e.g., spurting blood) AND [2] can't be stopped    Negative: Wound looks infected    Negative: Caused by animal bite    Negative: Caused by human bite    Negative: Amputated finger    Protocols used: FINGER INJURY-ADULT-AH    A car door closed on her pinky finger. They got the bleeding to stop but the wound is longer than 1/2 inch and it is open.  Kiersten Ray RN-BC  [facility]

## 2018-03-08 NOTE — ED PROVIDER NOTES
History     Chief Complaint   Patient presents with     Hand Injury     HPI  Gemini Mccabe is a 32 year old female who since the emergency department complaining of right fifth finger pain after he was smashed in a car door 45 minutes prior to arrival.  It is bruised and swollen and tender.  No other injuries to any other part of the hand or fingers.    Problem List:    Patient Active Problem List    Diagnosis Date Noted     Pneumonia 11/12/2010     Priority: High     UTI (urinary tract infection) 05/27/2011     Priority: Medium     seems to be resolved.  suspected renal calculi       CARDIOVASCULAR SCREENING; LDL GOAL LESS THAN 160 10/31/2010     Priority: Medium     Chronic UTI 01/16/2010     Priority: Medium     Active autistic disorder      Priority: Medium     Problem list name updated by automated process. Provider to review       Mental retardation 11/10/2003     Priority: Medium     Problem list name updated by automated process. Provider to review       Epilepsy (H) 11/10/2003     Priority: Medium     Problem list name updated by automated process. Provider to review       Absence of menstruation 11/10/2003     Priority: Medium     Constipation 11/10/2003     Priority: Medium     Problem list name updated by automated process. Provider to review          Past Medical History:    Past Medical History:   Diagnosis Date     Absence of menstruation      Autistic disorder, current or active state      Chronic UTI      Other specified congenital anomaly of kidney      Unspecified constipation      Unspecified epilepsy without mention of intractable epilepsy      Unspecified intellectual disabilities        Past Surgical History:    Past Surgical History:   Procedure Laterality Date     C REIMPLANT URETER,SINGLE URETER  1985       Family History:    Family History   Problem Relation Age of Onset     HEART DISEASE Father      MI at age 59     Connective Tissue Disorder Mother      lupus     HEART DISEASE Other       Grandfather       Social History:  Marital Status:  Single [1]  Social History   Substance Use Topics     Smoking status: Never Smoker     Smokeless tobacco: Never Used     Alcohol use No        Medications:      nitroFURantoin, macrocrystal-monohydrate, (MACROBID) 100 MG capsule   ascorbic acid (VITAMIN C) 500 MG tablet   medroxyPROGESTERone (DEPO-PROVERA) 150 MG/ML injection   CHILDRENS IBUPROFEN 100 MG/5ML suspension   Disposable Gloves (NITRILE GLOVES MEDIUM) MISC   Disposable Gloves (VINYL GLOVES) MISC   multivitamin, therapeutic with minerals (MULTI-VITAMIN) TABS   citalopram (CELEXA) 20 MG tablet   docusate (COLACE) 50 MG/5ML liquid   polyethylene glycol (MIRALAX/GLYCOLAX) powder   QUEtiapine Fumarate (SEROQUEL PO)   RISPERIDONE PO   LORAZEPAM PO   triamcinolone (KENALOG) 0.1 % cream   Salicylic Acid 40 % PADS   CRANBERRY FRUIT PO   BUTT PASTE - REGULAR (DR LOVE POOP GOOP BUTT PASTE FORMULA)   Nebulizer nebulization         Review of Systems   Unable to perform ROS: Other       Physical Exam   Pulse: 88  Temp: 98.6  F (37  C)  Resp: 18  Weight: 77.1 kg (170 lb)  SpO2: 97 %      Physical Exam   Constitutional: She appears well-developed and well-nourished.   HENT:   Head: Normocephalic and atraumatic.   Neck: Normal range of motion.   Musculoskeletal: She exhibits edema and tenderness. She exhibits no deformity.   Swelling, bruising, tenderness over the right fifth finger.  No pain or tenderness or abnormality to the rest of the right hand or extremity.   Skin: No erythema.   Abrasion to the right fifth finger.  Bruising to that same finger.       ED Course     ED Course     Procedures          Results for orders placed or performed during the hospital encounter of 03/08/18   Fingers XR, 2-3 views, right    Narrative    XR FINGER RT G/E 2 VW 3/8/2018 10:59 AM    COMPARISON: 4/22/2010    HISTORY: Fifth finger injury in car door.      Impression    IMPRESSION: There appears to be a minimally displaced  fracture  involving the base of the right fifth distal phalanx, seen best on the  lateral and oblique projections. No other fractures are suspected.    JOANNE MARTINI MD        Splint placed by ED tech.  Abrasion does not require closure.    The differential diagnosis, treatment options, risks and follow up discussed with a competent caretaker who agrees with the plan.      Labs Ordered and Resulted from Time of ED Arrival Up to the Time of Departure from the ED - No data to display    Assessments & Plan (with Medical Decision Making)     I have reviewed the nursing notes.    I have reviewed the findings, diagnosis, plan and need for follow up with the patient.      New Prescriptions    No medications on file       Final diagnoses:   Closed nondisplaced fracture of phalanx of right little finger, unspecified phalanx, initial encounter     Plan:  Splint  Ibuprofen  pcp follow up  3/8/2018   [facility] EMERGENCY DEPARTMENT     Luigi Neal MD  03/08/18 1121

## 2018-03-08 NOTE — ED AVS SNAPSHOT
[facility] Emergency Department    [address] [place] [address]    Phone:  [phone]    Fax:  [phone]                                       Gemini Mccabe   MRN: [id number]    Department:  [facility] Emergency Department   Date of Visit:  3/8/2018           After Visit Summary Signature Page     I have received my discharge instructions, and my questions have been answered. I have discussed any challenges I see with this plan with the nurse or doctor.    ..........................................................................................................................................  Patient/Patient Representative Signature      ..........................................................................................................................................  Patient Representative Print Name and Relationship to Patient    ..................................................               ................................................  Date                                            Time    ..........................................................................................................................................  Reviewed by Signature/Title    ...................................................              ..............................................  Date                                                            Time

## 2018-03-15 ENCOUNTER — TRANSFERRED RECORDS (OUTPATIENT)
Dept: HEALTH INFORMATION MANAGEMENT | Facility: CLINIC | Age: 33
End: 2018-03-15

## 2018-03-24 ENCOUNTER — HOSPITAL ENCOUNTER (EMERGENCY)
Facility: CLINIC | Age: 33
Discharge: HOME | End: 2018-03-24
Attending: FAMILY MEDICINE | Admitting: FAMILY MEDICINE
Payer: MEDICARE

## 2018-03-24 VITALS — WEIGHT: 170 LBS | BODY MASS INDEX: 34.34 KG/M2

## 2018-03-24 DIAGNOSIS — N39.0 URINARY TRACT INFECTION WITHOUT HEMATURIA, SITE UNSPECIFIED: ICD-10-CM

## 2018-03-24 LAB
ALBUMIN UR-MCNC: NEGATIVE MG/DL
APPEARANCE UR: ABNORMAL
BACTERIA #/AREA URNS HPF: ABNORMAL /HPF
BILIRUB UR QL STRIP: NEGATIVE
COLOR UR AUTO: YELLOW
GLUCOSE UR STRIP-MCNC: NEGATIVE MG/DL
HGB UR QL STRIP: NEGATIVE
KETONES UR STRIP-MCNC: NEGATIVE MG/DL
LEUKOCYTE ESTERASE UR QL STRIP: ABNORMAL
MUCOUS THREADS #/AREA URNS LPF: PRESENT /LPF
NITRATE UR QL: POSITIVE
PH UR STRIP: 6 PH (ref 5–7)
RBC #/AREA URNS AUTO: 3 /HPF (ref 0–2)
SOURCE: ABNORMAL
SP GR UR STRIP: 1.01 (ref 1–1.03)
SQUAMOUS #/AREA URNS AUTO: 1 /HPF (ref 0–1)
UROBILINOGEN UR STRIP-MCNC: 2 MG/DL (ref 0–2)
WBC #/AREA URNS AUTO: 43 /HPF (ref 0–5)
YEAST #/AREA URNS HPF: ABNORMAL /HPF

## 2018-03-24 PROCEDURE — 99283 EMERGENCY DEPT VISIT LOW MDM: CPT | Mod: Z6 | Performed by: FAMILY MEDICINE

## 2018-03-24 PROCEDURE — 99283 EMERGENCY DEPT VISIT LOW MDM: CPT | Performed by: FAMILY MEDICINE

## 2018-03-24 PROCEDURE — 87088 URINE BACTERIA CULTURE: CPT | Performed by: FAMILY MEDICINE

## 2018-03-24 PROCEDURE — 87086 URINE CULTURE/COLONY COUNT: CPT | Performed by: FAMILY MEDICINE

## 2018-03-24 PROCEDURE — 81001 URINALYSIS AUTO W/SCOPE: CPT | Performed by: FAMILY MEDICINE

## 2018-03-24 PROCEDURE — 87186 SC STD MICRODIL/AGAR DIL: CPT | Performed by: FAMILY MEDICINE

## 2018-03-24 RX ORDER — PHENAZOPYRIDINE HYDROCHLORIDE 200 MG/1
200 TABLET, FILM COATED ORAL 3 TIMES DAILY
Qty: 6 TABLET | Refills: 0 | Status: SHIPPED | OUTPATIENT
Start: 2018-03-24 | End: 2018-03-24

## 2018-03-24 RX ORDER — NITROFURANTOIN 25; 75 MG/1; MG/1
100 CAPSULE ORAL 2 TIMES DAILY
Qty: 14 CAPSULE | Refills: 0 | Status: SHIPPED | OUTPATIENT
Start: 2018-03-24 | End: 2018-05-08

## 2018-03-24 NOTE — ED AVS SNAPSHOT
[facility] Emergency Department    [address] [place] [address]    Phone:  [phone]    Fax:  [phone]                                       Gemini Mccabe   MRN: [id number]    Department:  [facility] Department   Date of Visit:  3/24/2018           Patient Information     Date Of Birth          1985        Your diagnoses for this visit were:     Urinary tract infection without hematuria, site unspecified        You were seen by Lit Alarcon DO and Timur Anaya MD.      Follow-up Information     Follow up with Maggie Coker In 3 days.    Specialty:  Family Practice    Why:  if not improving    Contact information:    [facility] [place]  [address] [address]  [phone]          Follow up with [facility] Emergency Department.    Specialty:  EMERGENCY MEDICINE    Why:  If symptoms worsen    Contact information:    [address]   [place] [address] [phone]    Additional information:    From [address]: Exit at "[employer] on south side of [place]. Turn right on [address]. Turn left at stoplight on [address]. [facility] will be in view two blocks ahead        Discharge Instructions       Gemini has another urinary tract infection.    Begin Macrobid 100 mg twice a day for 7 days.  Encouraged lots of fluids.    Take ibuprofen up to 600 mg 4 times a day with food as needed for pain.    The final results of the urine culture is pending. We will call you if your plan of care needs to change.     If you are not seeing an improvement within 3 days, please follow up with your primary care provider or clinic.     After discharge, please closely monitor for any new or worsening symptoms. Return to the Emergency Department at any time if your symptoms worsen.        Understanding Urinary Tract Infections (UTIs)  Most UTIs are caused by bacteria, although they may also be caused  by viruses or fungi. Bacteria from the bowel are the most common source of infection. The infection may start because of any of the following:    Sexual activity. During sex, bacteria can travel from the penis, vagina, or rectum into the urethra.     Bacteria on the skin outside the rectum may travel into the urethra. This is more common in women since the rectum and urethra are closer to each other than in men. Wiping from front to back after using the toilet and keeping the area clean can help prevent germs from getting to the urethra.    Blockage of urine flow through the urinary tract. If urine sits too long, germs may start to grow out of control.      Parts of the urinary tract  The infection can occur in any part of the urinary tract.    The kidneys collect and store urine.    The ureters carry urine from the kidneys to the bladder.    The bladder holds urine until you are ready to let it out.    The urethra carries urine from the bladder out of the body. It is shorter in women, so bacteria can move through it more easily. The urethra is longer in men, so a UTI is less likely to reach the bladder or kidneys in men.  Date Last Reviewed: 1/1/2017 2000-2017 [employer]. [address] [place] [address]. All rights reserved. This information is not intended as a substitute for professional medical care. Always follow your healthcare professional's instructions.          24 Hour Appointment Hotline       To make an appointment at any [facility], call [phone] ([phone]). If you don't have a family doctor or clinic, we will help you find one. [facility] clinics are conveniently located to serve the needs of you and your family.             Review of your medicines      Our records show that you are taking the medicines listed below. If these are incorrect, please call your family doctor or clinic.        Dose / Directions Last dose taken    ascorbic acid 500 MG tablet    Commonly known as:  VITAMIN C   Dose:  500 mg   Quantity:  30 tablet        Take 1 tablet (500 mg) by mouth daily   Refills:  0        BUTT PASTE - REGULAR   Commonly known as:  DR JAX RIVERO GOOP BUTT PASTE FORMULA   Quantity:  60 oz        Apply  topically See Admin Instructions. With each change of adult diaper.   Refills:  12        CHILDRENS IBUPROFEN 100 MG/5ML suspension   Quantity:  480 mL   Generic drug:  ibuprofen        TAKE 30 MLS BY MOUTH EVERY 6 HOURS AS NEEDED FOR FEVER.   Refills:  3        citalopram 20 MG tablet   Commonly known as:  celeXA   Dose:  20 mg   Quantity:  90 tablet        Take 1 tablet by mouth daily.   Refills:  3        CRANBERRY FRUIT PO        Take  by mouth. 500 mg, 1 capsule daily   Refills:  0        DEPO-PROVERA 150 MG/ML injection   Dose:  150 mg   Quantity:  1 mL   Generic drug:  medroxyPROGESTERone        Inject 1 mL (150 mg) into the muscle every 3 months Per verbal order of Johny Macedo PA-C for injections through 3/15/2018/ace   Refills:  3        docusate 50 MG/5ML liquid   Commonly known as:  COLACE   Dose:  100 mg        Take 100 mg by mouth daily.   Refills:  0        LORAZEPAM PO   Dose:  0.5 mg        Take 0.5 mg by mouth 2 times daily as needed Not to exceed 2 tabs in 24 hours   Refills:  0        multivitamin, therapeutic with minerals Tabs tablet   Dose:  1 tablet   Quantity:  100 tablet        Take 1 tablet by mouth daily   Refills:  3        nebulizer nebulization   Quantity:  1 Device        4 times daily. Use 4 times a day as instructed   Refills:  0        nitroFURantoin (macrocrystal-monohydrate) 100 MG capsule   Commonly known as:  MACROBID   Dose:  100 mg   Quantity:  14 capsule        Take 1 capsule (100 mg) by mouth 2 times daily   Refills:  0        polyethylene glycol powder   Commonly known as:  MIRALAX/GLYCOLAX   Dose:  1 capful        Take 1 capful by mouth daily as needed.   Refills:  0        RISPERIDONE PO   Dose:  0.25 mg        Take 0.25 mg  by mouth daily   Refills:  0        Salicylic Acid 40 % Pads   Dose:  1 each   Quantity:  3 each        Externally apply 1 each topically. As directed   Refills:  0        SEROQUEL PO        Take by mouth daily 100 mg in the morning and 300 mg at bedtime   Refills:  0        triamcinolone 0.1 % cream   Commonly known as:  KENALOG   Quantity:  15 g        Apply to the left arm rash 3 times a day   Refills:  1        * Vinyl Gloves Misc   Dose:  1 Box   Quantity:  50 each        1 Box as needed   Refills:  0        * NITRILE GLOVES MEDIUM Misc   Quantity:  100 each        USE EVERY DAY AS NEEDED *** MUST MAKE APPT. BEFORE ANY ADDITIONAL REFILLS**   Refills:  1        * Notice:  This list has 2 medication(s) that are the same as other medications prescribed for you. Read the directions carefully, and ask your doctor or other care provider to review them with you.            Prescriptions were sent or printed at these locations (1 Prescription)                   [facility] [place] [id number] [facility]    [address] [facility] [address] , [facility] [address]    Telephone:  [phone]   Fax:  [phone]   Hours:                  E-Prescribed (1 of 1)         nitroFURantoin, macrocrystal-monohydrate, (MACROBID) 100 MG capsule                Procedures and tests performed during your visit     UA reflex to Microscopic    Urine Culture      Orders Needing Specimen Collection     None      Pending Results     Date and Time Order Name Status Description    3/24/2018 1449 Urine Culture In process             Pending Culture Results     Date and Time Order Name Status Description    3/24/2018 1449 Urine Culture In process             Pending Results Instructions     If you had any lab results that were not finalized at the time of your Discharge, you can call the ED Lab Result RN at [phone]. You will be contacted by this team for any positive Lab results or changes in treatment. The nurses are available 7 days a  "week from 10A to 6:30P.  You can leave a message 24 hours per day and they will return your call.        Thank you for choosing [facility]       Thank you for choosing [facility] for your care. Our goal is always to provide you with excellent care. Hearing back from our patients is one way we can continue to improve our services. Please take a few minutes to complete the written survey that you may receive in the mail after you visit with us. Thank you!        [employer] Information     [employer] lets you send messages to your doctor, view your test results, renew your prescriptions, schedule appointments and more. To sign up, go to [email or web address] . Click on \"Log in\" on the left side of the screen, which will take you to the Welcome page. Then click on \"Sign up Now\" on the right side of the page.     You will be asked to enter the access code listed below, as well as some personal information. Please follow the directions to create your username and password.     Your access code is: [email or web address]  Expires: 2018  4:57 PM     Your access code will  in 90 days. If you need help or a new code, please call your [facility] clinic or [phone].        Care EveryWhere ID     This is your Care EveryWhere ID. This could be used by other organizations to access your [facility] medical records  [id number]        Equal Access to Services     KAYCEE AGUILA : Hadporter Vigil, waaxda poweradaha, qaybta kaalmada maeve, pema adam. So [facility] [phone].    ATENCIÓN: Si habla español, tiene a parker disposición servicios gratuitos de asistencia lingüística. Ortiz al [phone].    We comply with applicable federal civil rights laws and [place] laws. We do not discriminate on the basis of race, color, national origin, age, disability, sex, sexual orientation, or gender identity.            After Visit Summary       This is your record. Keep this with you and show to your " community pharmacist(s) and doctor(s) at your next visit.

## 2018-03-24 NOTE — ED AVS SNAPSHOT
[facility] Emergency Department    [address] [place] [address]    Phone:  [phone]    Fax:  [phone]                                       Gemini Mccabe   MRN: [id number]    Department:  [facility] Emergency Department   Date of Visit:  3/24/2018           After Visit Summary Signature Page     I have received my discharge instructions, and my questions have been answered. I have discussed any challenges I see with this plan with the nurse or doctor.    ..........................................................................................................................................  Patient/Patient Representative Signature      ..........................................................................................................................................  Patient Representative Print Name and Relationship to Patient    ..................................................               ................................................  Date                                            Time    ..........................................................................................................................................  Reviewed by Signature/Title    ...................................................              ..............................................  Date                                                            Time

## 2018-03-24 NOTE — DISCHARGE INSTRUCTIONS
Gemini has another urinary tract infection.    Begin Macrobid 100 mg twice a day for 7 days.  Encouraged lots of fluids.    Take ibuprofen up to 600 mg 4 times a day with food as needed for pain.    The final results of the urine culture is pending. We will call you if your plan of care needs to change.     If you are not seeing an improvement within 3 days, please follow up with your primary care provider or clinic.     After discharge, please closely monitor for any new or worsening symptoms. Return to the Emergency Department at any time if your symptoms worsen.        Understanding Urinary Tract Infections (UTIs)  Most UTIs are caused by bacteria, although they may also be caused by viruses or fungi. Bacteria from the bowel are the most common source of infection. The infection may start because of any of the following:    Sexual activity. During sex, bacteria can travel from the penis, vagina, or rectum into the urethra.     Bacteria on the skin outside the rectum may travel into the urethra. This is more common in women since the rectum and urethra are closer to each other than in men. Wiping from front to back after using the toilet and keeping the area clean can help prevent germs from getting to the urethra.    Blockage of urine flow through the urinary tract. If urine sits too long, germs may start to grow out of control.      Parts of the urinary tract  The infection can occur in any part of the urinary tract.    The kidneys collect and store urine.    The ureters carry urine from the kidneys to the bladder.    The bladder holds urine until you are ready to let it out.    The urethra carries urine from the bladder out of the body. It is shorter in women, so bacteria can move through it more easily. The urethra is longer in men, so a UTI is less likely to reach the bladder or kidneys in men.  Date Last Reviewed: 1/1/2017 2000-2017 [employer]. [address] [place] [address], [place] [address]. All  rights reserved. This information is not intended as a substitute for professional medical care. Always follow your healthcare professional's instructions.

## 2018-03-26 LAB
BACTERIA SPEC CULT: ABNORMAL
Lab: ABNORMAL
SPECIMEN SOURCE: ABNORMAL

## 2018-04-12 DIAGNOSIS — N39.0 RECURRENT URINARY TRACT INFECTION: ICD-10-CM

## 2018-04-12 LAB
ALBUMIN UR-MCNC: NEGATIVE MG/DL
APPEARANCE UR: CLEAR
BACTERIA #/AREA URNS HPF: ABNORMAL /HPF
BILIRUB UR QL STRIP: NEGATIVE
COLOR UR AUTO: YELLOW
GLUCOSE UR STRIP-MCNC: NEGATIVE MG/DL
HGB UR QL STRIP: NEGATIVE
KETONES UR STRIP-MCNC: NEGATIVE MG/DL
LEUKOCYTE ESTERASE UR QL STRIP: ABNORMAL
NITRATE UR QL: NEGATIVE
NON-SQ EPI CELLS #/AREA URNS LPF: ABNORMAL /LPF
PH UR STRIP: 7 PH (ref 5–7)
RBC #/AREA URNS AUTO: ABNORMAL /HPF
SOURCE: ABNORMAL
SP GR UR STRIP: 1.01 (ref 1–1.03)
UROBILINOGEN UR STRIP-ACNC: 0.2 EU/DL (ref 0.2–1)
WBC #/AREA URNS AUTO: ABNORMAL /HPF

## 2018-04-12 PROCEDURE — 81001 URINALYSIS AUTO W/SCOPE: CPT | Performed by: FAMILY MEDICINE

## 2018-04-12 PROCEDURE — 87086 URINE CULTURE/COLONY COUNT: CPT | Performed by: FAMILY MEDICINE

## 2018-04-12 PROCEDURE — 87088 URINE BACTERIA CULTURE: CPT | Performed by: FAMILY MEDICINE

## 2018-04-12 PROCEDURE — 87186 SC STD MICRODIL/AGAR DIL: CPT | Performed by: FAMILY MEDICINE

## 2018-04-16 LAB
BACTERIA SPEC CULT: ABNORMAL
BACTERIA SPEC CULT: ABNORMAL
Lab: ABNORMAL
SPECIMEN SOURCE: ABNORMAL

## 2018-05-08 ENCOUNTER — HOSPITAL ENCOUNTER (EMERGENCY)
Facility: CLINIC | Age: 33
Discharge: HOME | End: 2018-05-08
Attending: PHYSICIAN ASSISTANT | Admitting: PHYSICIAN ASSISTANT
Payer: MEDICARE

## 2018-05-08 VITALS
BODY MASS INDEX: 35.35 KG/M2 | WEIGHT: 175 LBS | OXYGEN SATURATION: 99 % | SYSTOLIC BLOOD PRESSURE: 125 MMHG | TEMPERATURE: 97.6 F | RESPIRATION RATE: 18 BRPM | DIASTOLIC BLOOD PRESSURE: 95 MMHG | HEART RATE: 93 BPM

## 2018-05-08 DIAGNOSIS — N30.00 ACUTE CYSTITIS WITHOUT HEMATURIA: ICD-10-CM

## 2018-05-08 LAB
ALBUMIN UR-MCNC: NEGATIVE MG/DL
APPEARANCE UR: ABNORMAL
BACTERIA #/AREA URNS HPF: ABNORMAL /HPF
BILIRUB UR QL STRIP: NEGATIVE
COLOR UR AUTO: YELLOW
GLUCOSE UR STRIP-MCNC: NEGATIVE MG/DL
HGB UR QL STRIP: NEGATIVE
KETONES UR STRIP-MCNC: NEGATIVE MG/DL
LEUKOCYTE ESTERASE UR QL STRIP: ABNORMAL
MUCOUS THREADS #/AREA URNS LPF: PRESENT /LPF
NITRATE UR QL: NEGATIVE
PH UR STRIP: 5 PH (ref 5–7)
RBC #/AREA URNS AUTO: 3 /HPF (ref 0–2)
SOURCE: ABNORMAL
SP GR UR STRIP: 1.02 (ref 1–1.03)
SQUAMOUS #/AREA URNS AUTO: 2 /HPF (ref 0–1)
UROBILINOGEN UR STRIP-MCNC: 0 MG/DL (ref 0–2)
WBC #/AREA URNS AUTO: 174 /HPF (ref 0–5)

## 2018-05-08 PROCEDURE — 81001 URINALYSIS AUTO W/SCOPE: CPT | Performed by: PHYSICIAN ASSISTANT

## 2018-05-08 PROCEDURE — 87088 URINE BACTERIA CULTURE: CPT | Performed by: PHYSICIAN ASSISTANT

## 2018-05-08 PROCEDURE — 99283 EMERGENCY DEPT VISIT LOW MDM: CPT | Performed by: PHYSICIAN ASSISTANT

## 2018-05-08 PROCEDURE — 87086 URINE CULTURE/COLONY COUNT: CPT | Performed by: PHYSICIAN ASSISTANT

## 2018-05-08 PROCEDURE — 99284 EMERGENCY DEPT VISIT MOD MDM: CPT | Mod: Z6 | Performed by: PHYSICIAN ASSISTANT

## 2018-05-08 PROCEDURE — 87186 SC STD MICRODIL/AGAR DIL: CPT | Performed by: PHYSICIAN ASSISTANT

## 2018-05-08 RX ORDER — CEFDINIR 300 MG/1
300 CAPSULE ORAL 2 TIMES DAILY
Qty: 14 CAPSULE | Refills: 0 | Status: SHIPPED | OUTPATIENT
Start: 2018-05-08 | End: 2018-05-15

## 2018-05-08 ASSESSMENT — ENCOUNTER SYMPTOMS
VOMITING: 0
DYSURIA: 1
DIFFICULTY URINATING: 1
APPETITE CHANGE: 0
FEVER: 0
WEAKNESS: 0

## 2018-05-08 NOTE — ED TRIAGE NOTES
She has had decreased urine output during the day, wetting her bed at night and says it hurts to urinate.

## 2018-05-08 NOTE — ED AVS SNAPSHOT
[facility] Emergency Department    [address] [place] [address]    Phone:  [phone]    Fax:  [phone]                                       Gemini Mccabe   MRN: [id number]    Department:  [facility] Emergency Department   Date of Visit:  5/8/2018           After Visit Summary Signature Page     I have received my discharge instructions, and my questions have been answered. I have discussed any challenges I see with this plan with the nurse or doctor.    ..........................................................................................................................................  Patient/Patient Representative Signature      ..........................................................................................................................................  Patient Representative Print Name and Relationship to Patient    ..................................................               ................................................  Date                                            Time    ..........................................................................................................................................  Reviewed by Signature/Title    ...................................................              ..............................................  Date                                                            Time

## 2018-05-08 NOTE — DISCHARGE INSTRUCTIONS
Take the antibiotics as prescribed.  Please return to the emergency department if symptoms worsen, otherwise schedule a follow-up visit in the next week with her primary care provider.    Thank you for choosing [facility]'s Emergency Department. It was a pleasure taking care of you today. If you have any questions, please call [phone].    Gia Freeman PA-C

## 2018-05-08 NOTE — ED PROVIDER NOTES
"  History     Chief Complaint   Patient presents with     Rule out Urinary Tract Infection     HPI  Gemini Mccabe is a 32 year old female with history of autism, mental retardation, recurrent UTIs, who presents to the emergency department for concerns of a urinary tract infection.  Patient is nonverbal so group home staff provide history.  They state that she has had about 3 UTIs since the beginning of March.  She has been treated with amoxicillin, Macrobid, and Keflex.  She just finished a course of Keflex at the end of April, but in the last few days symptoms have returned.  She has had decreased urinary output, incontinence at night.  When asked if it hurts to urinate the patient does reply yes.  She has had \"increased behaviors\" of acting out.  She has not had fevers, no appetite changes or vomiting.    Problem List:    Patient Active Problem List    Diagnosis Date Noted     Pneumonia 11/12/2010     Priority: High     UTI (urinary tract infection) 05/27/2011     Priority: Medium     seems to be resolved.  suspected renal calculi       CARDIOVASCULAR SCREENING; LDL GOAL LESS THAN 160 10/31/2010     Priority: Medium     Chronic UTI 01/16/2010     Priority: Medium     Active autistic disorder      Priority: Medium     Problem list name updated by automated process. Provider to review       Mental retardation 11/10/2003     Priority: Medium     Problem list name updated by automated process. Provider to review       Epilepsy (H) 11/10/2003     Priority: Medium     Problem list name updated by automated process. Provider to review       Absence of menstruation 11/10/2003     Priority: Medium     Constipation 11/10/2003     Priority: Medium     Problem list name updated by automated process. Provider to review          Past Medical History:    Past Medical History:   Diagnosis Date     Absence of menstruation      Autistic disorder, current or active state      Chronic UTI      Other specified congenital anomaly of " kidney      Unspecified constipation      Unspecified epilepsy without mention of intractable epilepsy      Unspecified intellectual disabilities        Past Surgical History:    Past Surgical History:   Procedure Laterality Date     C REIMPLANT URETER,SINGLE URETER  1985       Family History:    Family History   Problem Relation Age of Onset     HEART DISEASE Father      MI at age 59     Connective Tissue Disorder Mother      lupus     HEART DISEASE Other      Grandfather       Social History:  Marital Status:  Single [1]  Social History   Substance Use Topics     Smoking status: Never Smoker     Smokeless tobacco: Never Used     Alcohol use No        Medications:      cefdinir (OMNICEF) 300 MG capsule   ascorbic acid (VITAMIN C) 500 MG tablet   BUTT PASTE - REGULAR (DR LOVE POOP GOOP BUTT PASTE FORMULA)   CHILDRENS IBUPROFEN 100 MG/5ML suspension   citalopram (CELEXA) 20 MG tablet   CRANBERRY FRUIT PO   Disposable Gloves (NITRILE GLOVES MEDIUM) MISC   Disposable Gloves (VINYL GLOVES) MISC   docusate (COLACE) 50 MG/5ML liquid   LORAZEPAM PO   medroxyPROGESTERone (DEPO-PROVERA) 150 MG/ML injection   multivitamin, therapeutic with minerals (MULTI-VITAMIN) TABS   Nebulizer nebulization   polyethylene glycol (MIRALAX/GLYCOLAX) powder   QUEtiapine Fumarate (SEROQUEL PO)   RISPERIDONE PO   Salicylic Acid 40 % PADS   triamcinolone (KENALOG) 0.1 % cream         Review of Systems   Constitutional: Negative for appetite change and fever.   Gastrointestinal: Negative for vomiting.   Genitourinary: Positive for decreased urine volume, difficulty urinating, dysuria and enuresis.   Neurological: Negative for weakness.   Psychiatric/Behavioral: Positive for behavioral problems.   All other systems reviewed and are negative.      Physical Exam   BP: (!) 125/95  Pulse: 93  Temp: 97.6  F (36.4  C)  Resp: 18  Weight: 79.4 kg (175 lb)  SpO2: 99 %      Physical Exam   Constitutional: She appears well-developed and well-nourished. No  distress.   HENT:   Head: Normocephalic and atraumatic.   Mouth/Throat: Oropharynx is clear and moist. No oropharyngeal exudate.   Eyes: Conjunctivae are normal. Pupils are equal, round, and reactive to light. No scleral icterus.   Neck: Normal range of motion.   Cardiovascular: Normal rate, regular rhythm, normal heart sounds and intact distal pulses.    Pulmonary/Chest: Effort normal and breath sounds normal. No respiratory distress.   Abdominal: Soft. Bowel sounds are normal. There is no tenderness. There is no CVA tenderness.   Musculoskeletal: Normal range of motion.   Neurological: She is alert.   Skin: Skin is warm and dry. No rash noted. She is not diaphoretic.   Psychiatric:   Patient is pleasant, alert.  Nonverbal but cooperative.   Nursing note and vitals reviewed.      ED Course     ED Course     Procedures      Results for orders placed or performed during the hospital encounter of 05/08/18 (from the past 24 hour(s))   UA with Microscopic reflex to Culture   Result Value Ref Range    Color Urine Yellow     Appearance Urine Slightly Cloudy     Glucose Urine Negative NEG^Negative mg/dL    Bilirubin Urine Negative NEG^Negative    Ketones Urine Negative NEG^Negative mg/dL    Specific Gravity Urine 1.019 1.003 - 1.035    Blood Urine Negative NEG^Negative    pH Urine 5.0 5.0 - 7.0 pH    Protein Albumin Urine Negative NEG^Negative mg/dL    Urobilinogen mg/dL 0.0 0.0 - 2.0 mg/dL    Nitrite Urine Negative NEG^Negative    Leukocyte Esterase Urine Large (A) NEG^Negative    Source Unspecified Urine     WBC Urine 174 (H) 0 - 5 /HPF    RBC Urine 3 (H) 0 - 2 /HPF    Bacteria Urine Many (A) NEG^Negative /HPF    Squamous Epithelial /HPF Urine 2 (H) 0 - 1 /HPF    Mucous Urine Present (A) NEG^Negative /LPF       Medications - No data to display    Assessments & Plan (with Medical Decision Making)  Gemini Mccabe is a 32 year old female who presented to the ED for concerns of urinary symptoms.  She has a history of chronic  UTIs.  She has been eating and drinking well, no vomiting, no fevers.  Vitals are unremarkable here as is her exam.  Urinalysis showed large leukocyte esterase, 174 WBCs, and bacteria, suggestive of UTI.  No flank tenderness to suggest pyelonephritis. Patient was prescribed Omnicef for treatment of this UTI.  Because of recurrence so soon after the previous UTI, I advised follow-up in the next week in the clinic for reevaluation.  I did go over indications of when to return to the ED.  Group home staff expressed understanding and patient was discharged under their care to her home.     I have reviewed the nursing notes.    I have reviewed the findings, diagnosis, plan and need for follow up with the patient.    Discharge Medication List as of 5/8/2018  5:27 PM      START taking these medications    Details   cefdinir (OMNICEF) 300 MG capsule Take 1 capsule (300 mg) by mouth 2 times daily for 7 days, Disp-14 capsule, R-0, E-Prescribe             Final diagnoses:   Acute cystitis without hematuria     Note: Chart documentation done in part with Dragon Voice Recognition software. Although reviewed after completion, some word and grammatical errors may remain.      5/8/2018   [facility] EMERGENCY DEPARTMENT     Gia Freeman PA-C  05/08/18 3124

## 2018-05-08 NOTE — ED AVS SNAPSHOT
[facility] Emergency Department    [address] [place] [address]    Phone:  [phone]    Fax:  [phone]                                       Gemini Mccabe   MRN: [id number]    Department:  [facility] Emergency Department   Date of Visit:  5/8/2018           Patient Information     Date Of Birth          1985        Your diagnoses for this visit were:     Acute cystitis without hematuria        You were seen by Gia Freeman PA-C.      Follow-up Information     Call Maggie Coker    Specialty:  Family Practice    Why:  For ER follow up    Contact information:    [facility]  [address] [address]  [phone]          Follow up with [facility] Emergency Department.    Specialty:  EMERGENCY MEDICINE    Why:  If symptoms worsen    Contact information:    [address] [place] [address] [phone]    Additional information:    From [address]: Exit at [address] on [address] [place] of [place]. Turn right on [address]. Turn left at stoplight on [address]. [facility] will be in view two blocks ahead        Discharge Instructions       Take the antibiotics as prescribed.  Please return to the emergency department if symptoms worsen, otherwise schedule a follow-up visit in the next week with her primary care provider.    Thank you for choosing [facility]'s Emergency Department. It was a pleasure taking care of you today. If you have any questions, please call [phone].    Gia Freeman PA-C      Discharge References/Attachments     BLADDER INFECTION, FEMALE (ADULT) (ENGLISH)      Your next 10 appointments already scheduled     May 14, 2018  4:15 PM CDT   Nurse [facility] with [facility]EN [facility]   [facility]    [address] [facility] [address] [phone]              24 Hour Appointment Hotline       To make an appointment at any  [facility], call [phone] ([phone]). If you don't have a family doctor or clinic, we will help you find one. [facility] clinics are conveniently located to serve the needs of you and your family.             Review of your medicines      START taking        Dose / Directions Last dose taken    cefdinir 300 MG capsule   Commonly known as:  OMNICEF   Dose:  300 mg   Quantity:  14 capsule        Take 1 capsule (300 mg) by mouth 2 times daily for 7 days   Refills:  0          CONTINUE these medicines which may have CHANGED, or have new prescriptions. If we are uncertain of the size of tablets/capsules you have at home, strength may be listed as something that might have changed.        Dose / Directions Last dose taken    citalopram 20 MG tablet   Commonly known as:  celeXA   Dose:  20 mg   What changed:  how much to take   Quantity:  90 tablet        Take 1 tablet by mouth daily.   Refills:  3          Our records show that you are taking the medicines listed below. If these are incorrect, please call your family doctor or clinic.        Dose / Directions Last dose taken    ascorbic acid 500 MG tablet   Commonly known as:  VITAMIN C   Dose:  500 mg   Quantity:  30 tablet        Take 1 tablet (500 mg) by mouth daily   Refills:  0        BUTT PASTE - REGULAR   Commonly known as:  DR JAX RIVERO GOOP BUTT PASTE FORMULA   Quantity:  60 oz        Apply  topically See Admin Instructions. With each change of adult diaper.   Refills:  12        CHILDRENS IBUPROFEN 100 MG/5ML suspension   Quantity:  480 mL   Generic drug:  ibuprofen        TAKE 30 MLS BY MOUTH EVERY 6 HOURS AS NEEDED FOR FEVER.   Refills:  3        CRANBERRY FRUIT PO        Take  by mouth. 500 mg, 1 capsule daily   Refills:  0        DEPO-PROVERA 150 MG/ML injection   Dose:  150 mg   Quantity:  1 mL   Generic drug:  medroxyPROGESTERone        Inject 1 mL (150 mg) into the muscle every 3 months Per verbal order of Johny Macedo PA-C for injections  through 3/15/2018/ace   Refills:  3        docusate 50 MG/5ML liquid   Commonly known as:  COLACE   Dose:  100 mg        Take 100 mg by mouth daily.   Refills:  0        LORAZEPAM PO   Dose:  0.5 mg        Take 0.5 mg by mouth 2 times daily as needed Not to exceed 2 tabs in 24 hours   Refills:  0        multivitamin, therapeutic with minerals Tabs tablet   Dose:  1 tablet   Quantity:  100 tablet        Take 1 tablet by mouth daily   Refills:  3        nebulizer nebulization   Quantity:  1 Device        4 times daily. Use 4 times a day as instructed   Refills:  0        polyethylene glycol powder   Commonly known as:  MIRALAX/GLYCOLAX   Dose:  1 capful        Take 1 capful by mouth daily as needed.   Refills:  0        RISPERIDONE PO   Dose:  0.25 mg        Take 0.25 mg by mouth daily   Refills:  0        Salicylic Acid 40 % Pads   Dose:  1 each   Quantity:  3 each        Externally apply 1 each topically. As directed   Refills:  0        SEROQUEL PO        Take by mouth daily 100 mg in the morning and 300 mg at bedtime   Refills:  0        triamcinolone 0.1 % cream   Commonly known as:  KENALOG   Quantity:  15 g        Apply to the left arm rash 3 times a day   Refills:  1        * Vinyl Gloves Misc   Dose:  1 Box   Quantity:  50 each        1 Box as needed   Refills:  0        * NITRILE GLOVES MEDIUM Misc   Quantity:  100 each        USE EVERY DAY AS NEEDED *** MUST MAKE APPT. BEFORE ANY ADDITIONAL REFILLS**   Refills:  1        * Notice:  This list has 2 medication(s) that are the same as other medications prescribed for you. Read the directions carefully, and ask your doctor or other care provider to review them with you.      STOP taking        Dose Reason for stopping Comments    nitroFURantoin (macrocrystal-monohydrate) 100 MG capsule   Commonly known as:  MACROBID                      Prescriptions were sent or printed at these locations (1 Prescription)                   [facility]  "[place] - [address]   [address], [facility] [address]    Telephone:  [phone]   Fax:  [phone]   Hours:                  E-Prescribed (1 of 1)         cefdinir (OMNICEF) 300 MG capsule                Procedures and tests performed during your visit     UA with Microscopic reflex to Culture    Urine Culture Aerobic Bacterial      Orders Needing Specimen Collection     None      Pending Results     Date and Time Order Name Status Description    5/8/2018 1530 Urine Culture Aerobic Bacterial In process             Pending Culture Results     Date and Time Order Name Status Description    5/8/2018 1530 Urine Culture Aerobic Bacterial In process             Pending Results Instructions     If you had any lab results that were not finalized at the time of your Discharge, you can call the ED Lab Result RN at [phone]. You will be contacted by this team for any positive Lab results or changes in treatment. The nurses are available 7 days a week from 10A to 6:30P.  You can leave a message 24 hours per day and they will return your call.        Thank you for choosing [facility]       Thank you for choosing [facility] for your care. Our goal is always to provide you with excellent care. Hearing back from our patients is one way we can continue to improve our services. Please take a few minutes to complete the written survey that you may receive in the mail after you visit with us. Thank you!        [employer] Information     [employer] lets you send messages to your doctor, view your test results, renew your prescriptions, schedule appointments and more. To sign up, go to [email or web address] . Click on \"Log in\" on the left side of the screen, which will take you to the Welcome page. Then click on \"Sign up Now\" on the right side of the page.     You will be asked to enter the access code listed below, as well as some personal information. Please follow the directions to create your username and " password.     Your access code is: [email or web address]  Expires: 2018  4:57 PM     Your access code will  in 90 days. If you need help or a new code, please call your [place] clinic or [phone].        Care EveryWhere ID     This is your Care EveryWhere ID. This could be used by other organizations to access your [place] medical records  [id number]        Equal Access to Services     KAYCEE AGUILA : Gem shaikho Somelisa, waaxda lumarquitaadaha, qaybta kaalmada adetanviryaed, pema lr . So [facility] [phone].    ATENCIÓN: Si habla español, tiene a parker disposición servicios gratuitos de asistencia lingüística. Llame al [phone].    We comply with applicable federal civil rights laws and [place] laws. We do not discriminate on the basis of race, color, national origin, age, disability, sex, sexual orientation, or gender identity.            After Visit Summary       This is your record. Keep this with you and show to your community pharmacist(s) and doctor(s) at your next visit.

## 2018-05-13 LAB
BACTERIA SPEC CULT: ABNORMAL
Lab: ABNORMAL
SPECIMEN SOURCE: ABNORMAL

## 2018-05-14 ENCOUNTER — TELEPHONE (OUTPATIENT)
Dept: EMERGENCY MEDICINE | Facility: CLINIC | Age: 33
End: 2018-05-14

## 2018-05-15 ENCOUNTER — ALLIED HEALTH/NURSE VISIT (OUTPATIENT)
Dept: FAMILY MEDICINE | Facility: OTHER | Age: 33
End: 2018-05-15
Payer: MEDICARE

## 2018-05-15 DIAGNOSIS — N91.2 ABSENCE OF MENSTRUATION: Primary | ICD-10-CM

## 2018-05-15 PROCEDURE — 96372 THER/PROPH/DIAG INJ SC/IM: CPT

## 2018-05-15 NOTE — NURSING NOTE
The following medication was given:     MEDICATION: Medroxyprogesterone 150 mg  See medication note.  Patient instructed to remain in clinic for 20 minutes afterwards, and to report any adverse reaction to me immediately.  Prior to injection verified patient identity using patient's name and date of birth.  Sonya Gee MA     5/15/2018

## 2018-05-15 NOTE — MR AVS SNAPSHOT
"              After Visit Summary   5/15/2018    Gemini Mccabe    MRN: [id number]           Patient Information     Date Of Birth          1985        Visit Information        Provider Department      5/15/2018 4:00 PM TUTU ALLEN MA [facility]        Today's Diagnoses     Absence of menstruation    -  1       Follow-ups after your visit        Who to contact     If you have questions or need follow up information about today's clinic visit or your schedule please contact [facility] directly at [phone].  Normal or non-critical lab and imaging results will be communicated to you by [employer], letter or phone within 4 business days after the clinic has received the results. If you do not hear from us within 7 days, please contact the clinic through [employer] or phone. If you have a critical or abnormal lab result, we will notify you by phone as soon as possible.  Submit refill requests through [employer] or call your pharmacy and they will forward the refill request to us. Please allow 3 business days for your refill to be completed.          Additional Information About Your Visit        MyChart Information     [employer] lets you send messages to your doctor, view your test results, renew your prescriptions, schedule appointments and more. To sign up, go to [email or web address] . Click on \"Log in\" on the left side of the screen, which will take you to the Welcome page. Then click on \"Sign up Now\" on the right side of the page.     You will be asked to enter the access code listed below, as well as some personal information. Please follow the directions to create your username and password.     Your access code is: [email or web address]  Expires: 2018  4:57 PM     Your access code will  in 90 days. If you need help or a new code, please call your [facility] or [phone].        Care EveryWhere ID     This is your Care EveryWhere ID. This could be used by other organizations to " access your [place] medical records  [id number]         Blood Pressure from Last 3 Encounters:   05/08/18 (!) 125/95   10/20/17 (!) 121/100   03/15/17 124/80    Weight from Last 3 Encounters:   05/08/18 175 lb (79.4 kg)   03/24/18 170 lb (77.1 kg)   03/08/18 170 lb (77.1 kg)              We Performed the Following     INJECTION INTRAMUSCULAR OR SUB-Q     Medroxyprogesterone inj  1mg   (Depo Provera J-Code)          Today's Medication Changes          These changes are accurate as of 5/15/18  4:52 PM.  If you have any questions, ask your nurse or doctor.               These medicines have changed or have updated prescriptions.        Dose/Directions    citalopram 20 MG tablet   Commonly known as:  celeXA   This may have changed:  how much to take   Used for:  Autistic disorder, current or active state        Dose:  20 mg   Take 1 tablet by mouth daily.   Quantity:  90 tablet   Refills:  3                Primary Care Provider Office Phone # Fax #    Maggie PERLA John [place] [phone]       [facility] [address] [address]        Equal Access to Services     KAYCEE AGUILA AH: Hadii rodo mackey hadasho Sodollyali, waaxda luqadaha, qaybta kaalmada adeegyada, pema adam. So [place] [phone].    ATENCIÓN: Si habla español, tiene a parker disposición servicios gratuitos de asistencia lingüística. Llame al [phone].    We comply with applicable federal civil rights laws and [place] laws. We do not discriminate on the basis of race, color, national origin, age, disability, sex, sexual orientation, or gender identity.            Thank you!     Thank you for choosing [facility]  for your care. Our goal is always to provide you with excellent care. Hearing back from our patients is one way we can continue to improve our services. Please take a few minutes to complete the written survey that you may receive in the mail after your visit with us.  Thank you!             Your Updated Medication List - Protect others around you: Learn how to safely use, store and throw away your medicines at www.disposemymeds.org.          This list is accurate as of 5/15/18  4:52 PM.  Always use your most recent med list.                   Brand Name Dispense Instructions for use Diagnosis    ascorbic acid 500 MG tablet    VITAMIN C    30 tablet    Take 1 tablet (500 mg) by mouth daily    Mental retardation, Active autistic disorder       BUTT PASTE - REGULAR    DR LOVE POOP GOOP BUTT PASTE FORMULA    60 oz    Apply  topically See Admin Instructions. With each change of adult diaper.    Diaper rash       cefdinir 300 MG capsule    OMNICEF    14 capsule    Take 1 capsule (300 mg) by mouth 2 times daily for 7 days        CHILDRENS IBUPROFEN 100 MG/5ML suspension   Generic drug:  ibuprofen     480 mL    TAKE 30 MLS BY MOUTH EVERY 6 HOURS AS NEEDED FOR FEVER.    Chronic UTI       citalopram 20 MG tablet    celeXA    90 tablet    Take 1 tablet by mouth daily.    Autistic disorder, current or active state       CRANBERRY FRUIT PO      Take  by mouth. 500 mg, 1 capsule daily        DEPO-PROVERA 150 MG/ML injection   Generic drug:  medroxyPROGESTERone     1 mL    Inject 1 mL (150 mg) into the muscle every 3 months Per verbal order of Johny Macedo PA-C for injections through 3/15/2018/ace    Mental retardation       docusate 50 MG/5ML liquid    COLACE     Take 100 mg by mouth daily.        LORAZEPAM PO      Take 0.5 mg by mouth 2 times daily as needed Not to exceed 2 tabs in 24 hours        multivitamin, therapeutic with minerals Tabs tablet     100 tablet    Take 1 tablet by mouth daily    Unspecified intellectual disabilities, Autistic disorder, current or active state       nebulizer nebulization     1 Device    4 times daily. Use 4 times a day as instructed    Pneumonia       polyethylene glycol powder    MIRALAX/GLYCOLAX     Take 1 capful by mouth daily as needed.         RISPERIDONE PO      Take 0.25 mg by mouth daily        Salicylic Acid 40 % Pads     3 each    Externally apply 1 each topically. As directed    Plantar warts       SEROQUEL PO      Take by mouth daily 100 mg in the morning and 300 mg at bedtime        triamcinolone 0.1 % cream    KENALOG    15 g    Apply to the left arm rash 3 times a day        * Vinyl Gloves Misc     50 each    1 Box as needed    Constipation, unspecified constipation type, Active autistic disorder       * NITRILE GLOVES MEDIUM Misc     100 each    USE EVERY DAY AS NEEDED *** MUST MAKE APPT. BEFORE ANY ADDITIONAL REFILLS**    Active autistic disorder, Constipation, unspecified constipation type       * Notice:  This list has 2 medication(s) that are the same as other medications prescribed for you. Read the directions carefully, and ask your doctor or other care provider to review them with you.

## 2018-07-11 ENCOUNTER — OFFICE VISIT (OUTPATIENT)
Dept: FAMILY MEDICINE | Facility: OTHER | Age: 33
End: 2018-07-11
Payer: MEDICARE

## 2018-07-11 VITALS
TEMPERATURE: 98.9 F | SYSTOLIC BLOOD PRESSURE: 100 MMHG | WEIGHT: 173 LBS | HEART RATE: 90 BPM | BODY MASS INDEX: 33.96 KG/M2 | HEIGHT: 60 IN | RESPIRATION RATE: 16 BRPM | OXYGEN SATURATION: 97 % | DIASTOLIC BLOOD PRESSURE: 60 MMHG

## 2018-07-11 DIAGNOSIS — L01.00 IMPETIGO: Primary | ICD-10-CM

## 2018-07-11 PROCEDURE — 99213 OFFICE O/P EST LOW 20 MIN: CPT | Performed by: NURSE PRACTITIONER

## 2018-07-11 RX ORDER — MUPIROCIN 20 MG/G
OINTMENT TOPICAL 3 TIMES DAILY
Qty: 22 G | Refills: 1 | Status: SHIPPED | OUTPATIENT
Start: 2018-07-11 | End: 2018-10-05

## 2018-07-11 NOTE — PATIENT INSTRUCTIONS
Use the Bactroban three times daily until this clears up. Usually 1-2 weeks.     Follow up if symptoms fail to resolve as expected.     Understanding Impetigo  Impetigo is a common bacterial infection of the skin. It most often affects the face, arms, and legs. But it can appear on any part of the body. Anyone can have it, regardless of age. But it is most common in children. Impetigo is very contagious. This means it spreads easily to other people.  How to say it  [id number]   What causes impetigo?  Many types of bacteria live on normal, healthy skin. The bacteria usually don t cause problems. Impetigo happens when bacteria enter the skin through a scratch, break, sore, bite, or irritated spot. They then begin to grow out of control, leading to infection. There are two types of staphylococcus bacteria that cause impetigo. In certain cases, impetigo appears on skin that has no visible break. It may be more likely to occur on skin that has another skin problem, such as eczema. It may also be more common after a cold or other virus.  Symptoms of impetigo  Symptoms of this problem include:    Small, fluid-filled blisters on the skin that may itch, ooze, or crust    A yellow, honey-colored crust on the infected skin    Skin sores that spread with scratching    An itchy rash that spreads with scratching    Swollen lymph nodes  Treatment for impetigo  The goal is to treat the infection and prevent it from spreading to others.    You will likely be given an antibiotic to treat the infection. This may be a cream or ointment called muporicin to put on your skin. If the infection is severe or spreading, you may be given antibiotic medicine to take by mouth. Be sure to use this medicine as directed. Do not stop using it until you are told to stop, even if your skin gets better. If you stop too soon, the infection may come back and be harder to treat.    Avoid scratching or picking at your sores. It may help to cover affected  areas with a bandage.    To prevent spreading the infection, wash your hands often. Avoid sharing personal items, towels, clothes, pillows, and sheets with others. After each use, wash these items in hot water.    Clean the affected skin several times a day. Don t scrub. Instead, soak the area in warm, soapy water. This will help remove the crust that forms. For places that you can't soak, such as the face, place a clean, warm (not hot) washcloth on the affected area. Use a new washcloth and towel each time.  When to call your healthcare provider  Call your healthcare provider right away if you have any of these:    Fever of 100.4 F (38 C) or higher, or as directed    Increasing number of sores or spreading areas of redness after 2 days of treatment with antibiotics    Increasing swelling or pain    Increased amounts of fluid or pus coming from the sores    Unusual drowsiness, weakness, or change in behavior    Loss of appetite or vomiting   Date Last Reviewed: 5/1/2016 2000-2017 [employer]. [address] [place] [address], [place] [address]. All rights reserved. This information is not intended as a substitute for professional medical care. Always follow your healthcare professional's instructions.

## 2018-07-11 NOTE — PROGRESS NOTES
SUBJECTIVE:   Gemini Mccabe is a 32 year old female who presents to clinic today for the following health issues:      Rash      Duration: 2 months    Description  Location: corner of lips and bottom lip  Itching: no    Intensity:  mild    Accompanying signs and symptoms: possible pain    History (similar episodes/previous evaluation): None    Precipitating or alleviating factors:  New exposures:  None  Recent travel: no      Therapies tried and outcome: none      Problem list and histories reviewed & adjusted, as indicated.  Additional history: as documented    Patient Active Problem List   Diagnosis     Mental retardation     Epilepsy (H)     Absence of menstruation     Constipation     Active autistic disorder     Chronic UTI     CARDIOVASCULAR SCREENING; LDL GOAL LESS THAN 160     Pneumonia     UTI (urinary tract infection)     Past Surgical History:   Procedure Laterality Date     C REIMPLANT URETER,SINGLE URETER  1985       Social History   Substance Use Topics     Smoking status: Never Smoker     Smokeless tobacco: Never Used     Alcohol use No     Family History   Problem Relation Age of Onset     HEART DISEASE Father      MI at age 59     Connective Tissue Disorder Mother      lupus     HEART DISEASE Other      Grandfather         Current Outpatient Prescriptions   Medication Sig Dispense Refill     ascorbic acid (VITAMIN C) 500 MG tablet Take 1 tablet (500 mg) by mouth daily 30 tablet 0     BUTT PASTE - REGULAR (DR LOVE POOP GOOP BUTT PASTE FORMULA) Apply  topically See Admin Instructions. With each change of adult diaper. 60 oz 12     CHILDRENS IBUPROFEN 100 MG/5ML suspension TAKE 30 MLS BY MOUTH EVERY 6 HOURS AS NEEDED FOR FEVER. 480 mL 3     citalopram (CELEXA) 20 MG tablet Take 1 tablet by mouth daily. (Patient taking differently: Take 40 mg by mouth daily ) 90 tablet 3     CRANBERRY FRUIT PO Take  by mouth. 500 mg, 1 capsule daily       Disposable Gloves (NITRILE GLOVES MEDIUM) MISC USE EVERY DAY  "AS NEEDED REFILLS** 100 each 1     Disposable Gloves (VINYL GLOVES) MISC 1 Box as needed 50 each 0     docusate (COLACE) 50 MG/5ML liquid Take 100 mg by mouth daily.         LORAZEPAM PO Take 0.5 mg by mouth 2 times daily as needed Not to exceed 2 tabs in 24 hours       medroxyPROGESTERone (DEPO-PROVERA) 150 MG/ML injection Inject 1 mL (150 mg) into the muscle every 3 months Per verbal order of Johny Macedo PA-C for injections through 3/15/2018/ace 1 mL 3     multivitamin, therapeutic with minerals (MULTI-VITAMIN) TABS Take 1 tablet by mouth daily 100 tablet 3     mupirocin (BACTROBAN) 2 % ointment Apply topically 3 times daily Use three times daily until this heals up 22 g 1     Nebulizer nebulization 4 times daily. Use 4 times a day as instructed 1 Device 0     polyethylene glycol (MIRALAX/GLYCOLAX) powder Take 1 capful by mouth daily as needed.         QUEtiapine Fumarate (SEROQUEL PO) Take by mouth daily 100 mg in the morning and 300 mg at bedtime       RISPERIDONE PO Take 0.25 mg by mouth daily        Allergies   Allergen Reactions     Prevnar      Sulfa Drugs Nausea and Vomiting     BP Readings from Last 3 Encounters:   07/11/18 100/60   05/08/18 (!) 125/95   10/20/17 (!) 121/100    Wt Readings from Last 3 Encounters:   07/11/18 173 lb (78.5 kg)   05/08/18 175 lb (79.4 kg)   03/24/18 170 lb (77.1 kg)                    Reviewed and updated as needed this visit by clinical staff  Tobacco  Allergies  Meds  Soc Hx      Reviewed and updated as needed this visit by Provider         ROS:  Constitutional, HEENT, cardiovascular, pulmonary, gi and gu systems are negative, except as otherwise noted.    OBJECTIVE:     /60  Pulse 90  Temp 98.9  F (37.2  C) (Tympanic)  Resp 16  Ht 4' 11.5\" (1.511 m)  Wt 173 lb (78.5 kg)  SpO2 97%  Breastfeeding? No  BMI 34.36 kg/m2  Body mass index is 34.36 kg/(m^2).  GENERAL: alert, no distress and mentally disabled  NECK: no adenopathy, no asymmetry, masses, or scars "   SKIN: shaw crusting on both corners of her lips with some drainage, consistent with impetigo.     Diagnostic Test Results:  none     ASSESSMENT/PLAN:         1. Impetigo  - mupirocin (BACTROBAN) 2 % ointment; Apply topically 3 times daily Use three times daily until this heals up  Dispense: 22 g; Refill: 1    FUTURE APPOINTMENTS:       - Follow-up for annual visit or as needed  See Patient Instructions    BRANDON Reyes [facility]

## 2018-07-11 NOTE — MR AVS SNAPSHOT
After Visit Summary   7/11/2018    Gemini Mccabe    MRN: [id number]           Patient Information     Date Of Birth          1985        Visit Information        Provider Department      7/11/2018 4:00 PM Yissel Chandler APRN [facility]        Today's Diagnoses     Impetigo    -  1      Care Instructions    Use the Bactroban three times daily until this clears up. Usually 1-2 weeks.     Follow up if symptoms fail to resolve as expected.     Understanding Impetigo  Impetigo is a common bacterial infection of the skin. It most often affects the face, arms, and legs. But it can appear on any part of the body. Anyone can have it, regardless of age. But it is most common in children. Impetigo is very contagious. This means it spreads easily to other people.  How to say it  [id number]   What causes impetigo?  Many types of bacteria live on normal, healthy skin. The bacteria usually don t cause problems. Impetigo happens when bacteria enter the skin through a scratch, break, sore, bite, or irritated spot. They then begin to grow out of control, leading to infection. There are two types of staphylococcus bacteria that cause impetigo. In certain cases, impetigo appears on skin that has no visible break. It may be more likely to occur on skin that has another skin problem, such as eczema. It may also be more common after a cold or other virus.  Symptoms of impetigo  Symptoms of this problem include:    Small, fluid-filled blisters on the skin that may itch, ooze, or crust    A yellow, honey-colored crust on the infected skin    Skin sores that spread with scratching    An itchy rash that spreads with scratching    Swollen lymph nodes  Treatment for impetigo  The goal is to treat the infection and prevent it from spreading to others.    You will likely be given an antibiotic to treat the infection. This may be a cream or ointment called muporicin to put on your skin. If the infection  is severe or spreading, you may be given antibiotic medicine to take by mouth. Be sure to use this medicine as directed. Do not stop using it until you are told to stop, even if your skin gets better. If you stop too soon, the infection may come back and be harder to treat.    Avoid scratching or picking at your sores. It may help to cover affected areas with a bandage.    To prevent spreading the infection, wash your hands often. Avoid sharing personal items, towels, clothes, pillows, and sheets with others. After each use, wash these items in hot water.    Clean the affected skin several times a day. Don t scrub. Instead, soak the area in warm, soapy water. This will help remove the crust that forms. For places that you can't soak, such as the face, place a clean, warm (not hot) washcloth on the affected area. Use a new washcloth and towel each time.  When to call your healthcare provider  Call your healthcare provider right away if you have any of these:    Fever of 100.4 F (38 C) or higher, or as directed    Increasing number of sores or spreading areas of redness after 2 days of treatment with antibiotics    Increasing swelling or pain    Increased amounts of fluid or pus coming from the sores    Unusual drowsiness, weakness, or change in behavior    Loss of appetite or vomiting   Date Last Reviewed: 5/1/2016 2000-2017 [employer] [address] [place], [place] [address]. All rights reserved. This information is not intended as a substitute for professional medical care. Always follow your healthcare professional's instructions.                Follow-ups after your visit        Who to contact     If you have questions or need follow up information about today's clinic visit or your schedule please contact [facility] directly at [phone].  Normal or non-critical lab and imaging results will be communicated to you by MyChart, letter or phone within 4 business days after the  "clinic has received the results. If you do not hear from us within 7 days, please contact the clinic through [employer] or phone. If you have a critical or abnormal lab result, we will notify you by phone as soon as possible.  Submit refill requests through [employer] or call your pharmacy and they will forward the refill request to us. Please allow 3 business days for your refill to be completed.          Additional Information About Your Visit        Care EveryWhere ID     This is your Care EveryWhere ID. This could be used by other organizations to access your [facility] medical records  [id number]        Your Vitals Were     Pulse Temperature Respirations Height Pulse Oximetry Breastfeeding?    90 98.9  F (37.2  C) (Tympanic) 16 4' 11.5\" (1.511 m) 97% No    BMI (Body Mass Index)                   34.36 kg/m2            Blood Pressure from Last 3 Encounters:   07/11/18 100/60   05/08/18 (!) 125/95   10/20/17 (!) 121/100    Weight from Last 3 Encounters:   07/11/18 173 lb (78.5 kg)   05/08/18 175 lb (79.4 kg)   03/24/18 170 lb (77.1 kg)              Today, you had the following     No orders found for display         Today's Medication Changes          These changes are accurate as of 7/11/18  4:21 PM.  If you have any questions, ask your nurse or doctor.               Start taking these medicines.        Dose/Directions    mupirocin 2 % ointment   Commonly known as:  BACTROBAN   Used for:  Impetigo   Started by:  Yissel Chandler APRN CNP        Apply topically 3 times daily Use three times daily until this heals up   Quantity:  22 g   Refills:  1         These medicines have changed or have updated prescriptions.        Dose/Directions    citalopram 20 MG tablet   Commonly known as:  celeXA   This may have changed:  how much to take   Used for:  Autistic disorder, current or active state        Dose:  20 mg   Take 1 tablet by mouth daily.   Quantity:  90 tablet   Refills:  3            Where to get your medicines    "   These medications were sent to [id number] - [place], [place] - [address] [place] [address]  [address] , [facility] [address]    Hours:  M-F 8:30-6:30; Sat 9-4; closed Sunday Phone:  [phone]     mupirocin 2 % ointment                Primary Care Provider Office Phone # Fax #    Maggie Coker [phone]       [facility] [address] [facility] [address]        Equal Access to Services     KAYCEE AGUILA : Hadii aad ku hadasho Soomaali, waaxda luqadaha, qaybta kaalmada adeegyada, waxay idiin hayaan adeeg kharajayme lr . So [facility] [phone].    ATENCIÓN: Si habla español, tiene a parker disposición servicios gratuitos de asistencia lingüística. Ortiz al [phone].    We comply with applicable federal civil rights laws and [place] laws. We do not discriminate on the basis of race, color, national origin, age, disability, sex, sexual orientation, or gender identity.            Thank you!     Thank you for choosing [facility]  for your care. Our goal is always to provide you with excellent care. Hearing back from our patients is one way we can continue to improve our services. Please take a few minutes to complete the written survey that you may receive in the mail after your visit with us. Thank you!             Your Updated Medication List - Protect others around you: Learn how to safely use, store and throw away your medicines at www.disposemymeds.org.          This list is accurate as of 7/11/18  4:21 PM.  Always use your most recent med list.                   Brand Name Dispense Instructions for use Diagnosis    ascorbic acid 500 MG tablet    VITAMIN C    30 tablet    Take 1 tablet (500 mg) by mouth daily    Mental retardation, Active autistic disorder       BUTT PASTE - REGULAR    DR LOVE POOP GOOP BUTT PASTE FORMULA    60 oz    Apply  topically See Admin Instructions. With each change of adult diaper.    Diaper rash       CHILDRENS IBUPROFEN 100 MG/5ML  suspension   Generic drug:  ibuprofen     480 mL    TAKE 30 MLS BY MOUTH EVERY 6 HOURS AS NEEDED FOR FEVER.    Chronic UTI       citalopram 20 MG tablet    celeXA    90 tablet    Take 1 tablet by mouth daily.    Autistic disorder, current or active state       CRANBERRY FRUIT PO      Take  by mouth. 500 mg, 1 capsule daily        DEPO-PROVERA 150 MG/ML injection   Generic drug:  medroxyPROGESTERone     1 mL    Inject 1 mL (150 mg) into the muscle every 3 months Per verbal order of Johny Macedo PA-C for injections through 3/15/2018/ace    Mental retardation       docusate 50 MG/5ML liquid    COLACE     Take 100 mg by mouth daily.        LORAZEPAM PO      Take 0.5 mg by mouth 2 times daily as needed Not to exceed 2 tabs in 24 hours        multivitamin, therapeutic with minerals Tabs tablet     100 tablet    Take 1 tablet by mouth daily    Unspecified intellectual disabilities, Autistic disorder, current or active state       mupirocin 2 % ointment    BACTROBAN    22 g    Apply topically 3 times daily Use three times daily until this heals up    Impetigo       nebulizer nebulization     1 Device    4 times daily. Use 4 times a day as instructed    Pneumonia       polyethylene glycol powder    MIRALAX/GLYCOLAX     Take 1 capful by mouth daily as needed.        RISPERIDONE PO      Take 0.25 mg by mouth daily        SEROQUEL PO      Take by mouth daily 100 mg in the morning and 300 mg at bedtime        * Vinyl Gloves Misc     50 each    1 Box as needed    Constipation, unspecified constipation type, Active autistic disorder       * NITRILE GLOVES MEDIUM Misc     100 each    USE EVERY DAY AS NEEDED *** MUST MAKE APPT. BEFORE ANY ADDITIONAL REFILLS**    Active autistic disorder, Constipation, unspecified constipation type       * Notice:  This list has 2 medication(s) that are the same as other medications prescribed for you. Read the directions carefully, and ask your doctor or other care provider to review them with you.

## 2018-08-10 ENCOUNTER — ALLIED HEALTH/NURSE VISIT (OUTPATIENT)
Dept: FAMILY MEDICINE | Facility: OTHER | Age: 33
End: 2018-08-10
Payer: MEDICARE

## 2018-08-10 DIAGNOSIS — N91.2 ABSENCE OF MENSTRUATION: Primary | ICD-10-CM

## 2018-08-10 PROCEDURE — 96372 THER/PROPH/DIAG INJ SC/IM: CPT

## 2018-08-10 RX ORDER — MEDROXYPROGESTERONE ACETATE 150 MG/ML
150 INJECTION, SUSPENSION INTRAMUSCULAR
Qty: 1 ML | Refills: 3 | COMMUNITY
Start: 2018-08-10 | End: 2019-08-16

## 2018-08-10 NOTE — MR AVS SNAPSHOT
After Visit Summary   8/10/2018    Gemini Mccabe    MRN: [id number]           Patient Information     Date Of Birth          1985        Visit Information        Provider Department      8/10/2018 3:45 PM TUTU ALLEN MA [facility]        Today's Diagnoses     Absence of menstruation    -  1       Follow-ups after your visit        Who to contact     If you have questions or need follow up information about today's clinic visit or your schedule please contact [facility] directly at [phone].  Normal or non-critical lab and imaging results will be communicated to you by MyChart, letter or phone within 4 business days after the clinic has received the results. If you do not hear from us within 7 days, please contact the clinic through MyChart or phone. If you have a critical or abnormal lab result, we will notify you by phone as soon as possible.  Submit refill requests through [employer] or call your pharmacy and they will forward the refill request to us. Please allow 3 business days for your refill to be completed.          Additional Information About Your Visit        Care EveryWhere ID     This is your Care EveryWhere ID. This could be used by other organizations to access your [facility] medical records  [id number]         Blood Pressure from Last 3 Encounters:   07/11/18 100/60   05/08/18 (!) 125/95   10/20/17 (!) 121/100    Weight from Last 3 Encounters:   07/11/18 173 lb (78.5 kg)   05/08/18 175 lb (79.4 kg)   03/24/18 170 lb (77.1 kg)              We Performed the Following     INJECTION INTRAMUSCULAR OR SUB-Q     MEDROXYPROGESTERONE INJ          Today's Medication Changes          These changes are accurate as of 8/10/18  3:59 PM.  If you have any questions, ask your nurse or doctor.               These medicines have changed or have updated prescriptions.        Dose/Directions    citalopram 20 MG tablet   Commonly known as:  celeXA   This may have changed:  how  much to take   Used for:  Autistic disorder, current or active state        Dose:  20 mg   Take 1 tablet by mouth daily.   Quantity:  90 tablet   Refills:  3       DEPO-PROVERA 150 MG/ML injection   This may have changed:  additional instructions   Used for:  Absence of menstruation   Generic drug:  medroxyPROGESTERone        Dose:  150 mg   Inject 1 mL (150 mg) into the muscle every 3 months   Quantity:  1 mL   Refills:  3                Primary Care Provider Office Phone # Fax #    Maggie Coker [phone]       [facility] [address] [facility] [address]        Equal Access to Services     NORAH [facility] : Hadporter Vigil, chiki palomo, eloisa mcfarlane, pema lr . So [facility] [phone].    ATENCIÓN: Si habla español, tiene a parker disposición servicios gratuitos de asistencia lingüística. [facility] [phone].    We comply with applicable federal civil rights laws and [place] laws. We do not discriminate on the basis of race, color, national origin, age, disability, sex, sexual orientation, or gender identity.            Thank you!     Thank you for choosing [facility]  for your care. Our goal is always to provide you with excellent care. Hearing back from our patients is one way we can continue to improve our services. Please take a few minutes to complete the written survey that you may receive in the mail after your visit with us. Thank you!             Your Updated Medication List - Protect others around you: Learn how to safely use, store and throw away your medicines at www.disposemymeds.org.          This list is accurate as of 8/10/18  3:59 PM.  Always use your most recent med list.                   Brand Name Dispense Instructions for use Diagnosis    ascorbic acid 500 MG tablet    VITAMIN C    30 tablet    Take 1 tablet (500 mg) by mouth daily    Mental retardation, Active autistic disorder        BUTT PASTE - REGULAR    DR LOVE POOP GOOP BUTT PASTE FORMULA    60 oz    Apply  topically See Admin Instructions. With each change of adult diaper.    Diaper rash       CHILDRENS IBUPROFEN 100 MG/5ML suspension   Generic drug:  ibuprofen     480 mL    TAKE 30 MLS BY MOUTH EVERY 6 HOURS AS NEEDED FOR FEVER.    Chronic UTI       citalopram 20 MG tablet    celeXA    90 tablet    Take 1 tablet by mouth daily.    Autistic disorder, current or active state       CRANBERRY FRUIT PO      Take  by mouth. 500 mg, 1 capsule daily        DEPO-PROVERA 150 MG/ML injection   Generic drug:  medroxyPROGESTERone     1 mL    Inject 1 mL (150 mg) into the muscle every 3 months    Absence of menstruation       docusate 50 MG/5ML liquid    COLACE     Take 100 mg by mouth daily.        LORAZEPAM PO      Take 0.5 mg by mouth 2 times daily as needed Not to exceed 2 tabs in 24 hours        multivitamin, therapeutic with minerals Tabs tablet     100 tablet    Take 1 tablet by mouth daily    Unspecified intellectual disabilities, Autistic disorder, current or active state       mupirocin 2 % ointment    BACTROBAN    22 g    Apply topically 3 times daily Use three times daily until this heals up    Impetigo       nebulizer nebulization     1 Device    4 times daily. Use 4 times a day as instructed    Pneumonia       polyethylene glycol powder    MIRALAX/GLYCOLAX     Take 1 capful by mouth daily as needed.        RISPERIDONE PO      Take 0.25 mg by mouth daily        SEROQUEL PO      Take by mouth daily 100 mg in the morning and 300 mg at bedtime        * Vinyl Gloves Misc     50 each    1 Box as needed    Constipation, unspecified constipation type, Active autistic disorder       * NITRILE GLOVES MEDIUM Misc     100 each    USE EVERY DAY AS NEEDED *** MUST MAKE APPT. BEFORE ANY ADDITIONAL REFILLS**    Active autistic disorder, Constipation, unspecified constipation type       * Notice:  This list has 2 medication(s) that are the same as  other medications prescribed for you. Read the directions carefully, and ask your doctor or other care provider to review them with you.

## 2018-08-10 NOTE — NURSING NOTE
The following medication was given:     MEDICATION: Medroxyprogesterone 150 mg  See medication note.  Patient instructed to remain in clinic for 20 minutes afterwards, and to report any adverse reaction to me immediately.  Prior to injection verified patient identity using patient's name and date of birth.  Sonya Gee MA     8/10/2018

## 2018-09-24 DIAGNOSIS — N39.0 RECURRENT URINARY TRACT INFECTION: ICD-10-CM

## 2018-09-24 LAB
ALBUMIN UR-MCNC: NEGATIVE MG/DL
APPEARANCE UR: ABNORMAL
BACTERIA #/AREA URNS HPF: ABNORMAL /HPF
BILIRUB UR QL STRIP: NEGATIVE
COLOR UR AUTO: YELLOW
GLUCOSE UR STRIP-MCNC: NEGATIVE MG/DL
HGB UR QL STRIP: NEGATIVE
KETONES UR STRIP-MCNC: NEGATIVE MG/DL
LEUKOCYTE ESTERASE UR QL STRIP: ABNORMAL
NITRATE UR QL: POSITIVE
PH UR STRIP: 7.5 PH (ref 5–7)
RBC #/AREA URNS AUTO: ABNORMAL /HPF
SOURCE: ABNORMAL
SP GR UR STRIP: 1.01 (ref 1–1.03)
UROBILINOGEN UR STRIP-ACNC: 0.2 EU/DL (ref 0.2–1)
WBC #/AREA URNS AUTO: ABNORMAL /HPF

## 2018-09-24 PROCEDURE — 87186 SC STD MICRODIL/AGAR DIL: CPT | Performed by: FAMILY MEDICINE

## 2018-09-24 PROCEDURE — 87088 URINE BACTERIA CULTURE: CPT | Performed by: FAMILY MEDICINE

## 2018-09-24 PROCEDURE — 81001 URINALYSIS AUTO W/SCOPE: CPT | Performed by: FAMILY MEDICINE

## 2018-09-24 PROCEDURE — 87086 URINE CULTURE/COLONY COUNT: CPT | Performed by: FAMILY MEDICINE

## 2018-09-25 LAB
BACTERIA SPEC CULT: ABNORMAL
Lab: ABNORMAL
SPECIMEN SOURCE: ABNORMAL

## 2018-10-05 ENCOUNTER — OFFICE VISIT (OUTPATIENT)
Dept: FAMILY MEDICINE | Facility: OTHER | Age: 33
End: 2018-10-05
Payer: MEDICARE

## 2018-10-05 VITALS
WEIGHT: 167 LBS | SYSTOLIC BLOOD PRESSURE: 104 MMHG | HEIGHT: 60 IN | TEMPERATURE: 97.7 F | RESPIRATION RATE: 20 BRPM | DIASTOLIC BLOOD PRESSURE: 70 MMHG | BODY MASS INDEX: 32.79 KG/M2 | HEART RATE: 72 BPM

## 2018-10-05 DIAGNOSIS — F79 MENTAL RETARDATION: ICD-10-CM

## 2018-10-05 DIAGNOSIS — F84.0 ACTIVE AUTISTIC DISORDER: ICD-10-CM

## 2018-10-05 DIAGNOSIS — R31.9 URINARY TRACT INFECTION WITH HEMATURIA, SITE UNSPECIFIED: ICD-10-CM

## 2018-10-05 DIAGNOSIS — G40.909 NONINTRACTABLE EPILEPSY WITHOUT STATUS EPILEPTICUS, UNSPECIFIED EPILEPSY TYPE (H): ICD-10-CM

## 2018-10-05 DIAGNOSIS — N39.0 FREQUENT UTI: ICD-10-CM

## 2018-10-05 DIAGNOSIS — Q63.1 HORSESHOE KIDNEY: ICD-10-CM

## 2018-10-05 DIAGNOSIS — N39.0 URINARY TRACT INFECTION WITH HEMATURIA, SITE UNSPECIFIED: ICD-10-CM

## 2018-10-05 DIAGNOSIS — Z76.89 ESTABLISHING CARE WITH NEW DOCTOR, ENCOUNTER FOR: Primary | ICD-10-CM

## 2018-10-05 PROCEDURE — 99214 OFFICE O/P EST MOD 30 MIN: CPT | Performed by: NURSE PRACTITIONER

## 2018-10-05 RX ORDER — LORAZEPAM 0.5 MG/1
0.5 TABLET ORAL 2 TIMES DAILY PRN
Qty: 60 TABLET | Refills: 1 | COMMUNITY
Start: 2018-10-05

## 2018-10-05 RX ORDER — SULFAMETHOXAZOLE/TRIMETHOPRIM 800-160 MG
1 TABLET ORAL 2 TIMES DAILY
Qty: 14 TABLET | Refills: 0 | Status: SHIPPED | OUTPATIENT
Start: 2018-10-05 | End: 2018-10-05 | Stop reason: ALTCHOICE

## 2018-10-05 RX ORDER — RISPERIDONE 0.5 MG/1
0.5 TABLET ORAL 2 TIMES DAILY PRN
Qty: 30 TABLET | Refills: 1 | COMMUNITY
Start: 2018-10-05 | End: 2019-08-16

## 2018-10-05 RX ORDER — NITROFURANTOIN 25; 75 MG/1; MG/1
100 CAPSULE ORAL 2 TIMES DAILY
Qty: 14 CAPSULE | Refills: 0 | Status: SHIPPED | OUTPATIENT
Start: 2018-10-05 | End: 2019-03-06

## 2018-10-05 RX ORDER — CITALOPRAM HYDROBROMIDE 40 MG/1
40 TABLET ORAL DAILY
Qty: 90 TABLET | Refills: 3 | COMMUNITY
Start: 2018-10-05 | End: 2019-06-17

## 2018-10-05 RX ORDER — POLYETHYLENE GLYCOL 3350 17 G/17G
POWDER, FOR SOLUTION ORAL
COMMUNITY
Start: 2018-06-15 | End: 2019-08-16

## 2018-10-05 NOTE — MR AVS SNAPSHOT
After Visit Summary   10/5/2018    Gemini Mccabe    MRN: [id number]           Patient Information     Date Of Birth          1985        Visit Information        Provider Department      10/5/2018 3:00 PM Yissel Chandler APRN CNP [facility]        Today's Diagnoses     Urinary tract infection with hematuria, site unspecified    -  1      Care Instructions    If you want her to have a flu shot make a nurse only visit.     There are standing orders for you to drop off urine samples if needed.               Follow-ups after your visit        Follow-up notes from your care team     Return in about 1 year (around 10/5/2019) for Routine Visit.      Future tests that were ordered for you today     Open Standing Orders        Priority Remaining Interval Expires Ordered    *UA reflex to Microscopic and Culture ([place] and [facility] (except [place] and [place]) Routine 12/12  10/5/2019 10/5/2018            Who to contact     If you have questions or need follow up information about today's clinic visit or your schedule please contact [facility] directly at [phone].  Normal or non-critical lab and imaging results will be communicated to you by MyChart, letter or phone within 4 business days after the clinic has received the results. If you do not hear from us within 7 days, please contact the clinic through MyChart or phone. If you have a critical or abnormal lab result, we will notify you by phone as soon as possible.  Submit refill requests through [employer] or call your pharmacy and they will forward the refill request to us. Please allow 3 business days for your refill to be completed.          Additional Information About Your Visit        Care EveryWhere ID     This is your Care EveryWhere ID. This could be used by other organizations to access your [facility] medical records  [id number]        Your Vitals Were     Pulse Temperature Respirations Height Last  "Period Breastfeeding?    72 97.7  F (36.5  C) (Tympanic) 20 4' 11.5\" (1.511 m) (LMP Unknown) No    BMI (Body Mass Index)                   33.17 kg/m2            Blood Pressure from Last 3 Encounters:   10/05/18 104/70   07/11/18 100/60   05/08/18 (!) 125/95    Weight from Last 3 Encounters:   10/05/18 167 lb (75.8 kg)   07/11/18 173 lb (78.5 kg)   05/08/18 175 lb (79.4 kg)                 Today's Medication Changes          These changes are accurate as of 10/5/18  3:34 PM.  If you have any questions, ask your nurse or doctor.               Start taking these medicines.        Dose/Directions    nitroFURantoin (macrocrystal-monohydrate) 100 MG capsule   Commonly known as:  MACROBID   Used for:  Urinary tract infection with hematuria, site unspecified   Started by:  Yissel Chandler APRN CNP        Dose:  100 mg   Take 1 capsule (100 mg) by mouth 2 times daily   Quantity:  14 capsule   Refills:  0         These medicines have changed or have updated prescriptions.        Dose/Directions    citalopram 20 MG tablet   Commonly known as:  celeXA   This may have changed:  how much to take   Used for:  Autistic disorder, current or active state        Dose:  20 mg   Take 1 tablet by mouth daily.   Quantity:  90 tablet   Refills:  3            Where to get your medicines      These medications were sent to [id number] - [place], [place] - [address] [place] [address]    Hours:  M-F 8:30-6:30; Sat 9-4; closed Sunday Phone:  [phone]     nitroFURantoin (macrocrystal-monohydrate) 100 MG capsule                Primary Care Provider Office Phone # Fax #    BRANDON Reyes [phone]       [address] [facility] [address]        Equal Access to Services     KAYCEE AGUILA AH: Gem siddiqui Somelisa, waaxda luqadaha, qaybta kaalmaed mcfarlane, pema adam. [facility] [phone].    ATENCIÓN: Si tano mendoza, tiene a praker disposición servicios " bhavik de asistencia lingüística. Ortiz garcia [phone].    We comply with applicable federal civil rights laws and [place] laws. We do not discriminate on the basis of race, color, national origin, age, disability, sex, sexual orientation, or gender identity.            Thank you!     Thank you for choosing [facility]  for your care. Our goal is always to provide you with excellent care. Hearing back from our patients is one way we can continue to improve our services. Please take a few minutes to complete the written survey that you may receive in the mail after your visit with us. Thank you!             Your Updated Medication List - Protect others around you: Learn how to safely use, store and throw away your medicines at www.disposemymeds.org.          This list is accurate as of 10/5/18  3:34 PM.  Always use your most recent med list.                   Brand Name Dispense Instructions for use Diagnosis    ascorbic acid 500 MG tablet    VITAMIN C    30 tablet    Take 1 tablet (500 mg) by mouth daily    Mental retardation, Active autistic disorder       BUTT PASTE - REGULAR    DR LOVE POOP GOOP BUTT PASTE FORMULA    60 oz    Apply  topically See Admin Instructions. With each change of adult diaper.    Diaper rash       CHILDRENS IBUPROFEN 100 MG/5ML suspension   Generic drug:  ibuprofen     480 mL    TAKE 30 MLS BY MOUTH EVERY 6 HOURS AS NEEDED FOR FEVER.    Chronic UTI       citalopram 20 MG tablet    celeXA    90 tablet    Take 1 tablet by mouth daily.    Autistic disorder, current or active state       CRANBERRY FRUIT PO      Take  by mouth. 500 mg, 1 capsule daily        DEPO-PROVERA 150 MG/ML injection   Generic drug:  medroxyPROGESTERone     1 mL    Inject 1 mL (150 mg) into the muscle every 3 months    Absence of menstruation       docusate 50 MG/5ML liquid    COLACE     Take 100 mg by mouth daily.        LORAZEPAM PO      Take 0.5 mg by mouth 2 times daily as needed Not to exceed 2 tabs  in 24 hours        MIRALAX powder   Generic drug:  polyethylene glycol      Take 1 tsp mixed with liquid by mouth once daily        multivitamin, therapeutic with minerals Tabs tablet     100 tablet    Take 1 tablet by mouth daily    Unspecified intellectual disabilities, Autistic disorder, current or active state       nebulizer nebulization     1 Device    4 times daily. Use 4 times a day as instructed    Pneumonia       nitroFURantoin (macrocrystal-monohydrate) 100 MG capsule    MACROBID    14 capsule    Take 1 capsule (100 mg) by mouth 2 times daily    Urinary tract infection with hematuria, site unspecified       RISPERIDONE PO      Take 0.25 mg by mouth daily        SEROQUEL PO      Take by mouth daily 100 mg in the morning and 300 mg at bedtime        * Vinyl Gloves Misc     50 each    1 Box as needed    Constipation, unspecified constipation type, Active autistic disorder       * NITRILE GLOVES MEDIUM Misc     100 each    USE EVERY DAY AS NEEDED *** MUST MAKE APPT. BEFORE ANY ADDITIONAL REFILLS**    Active autistic disorder, Constipation, unspecified constipation type       * Notice:  This list has 2 medication(s) that are the same as other medications prescribed for you. Read the directions carefully, and ask your doctor or other care provider to review them with you.

## 2018-10-05 NOTE — PROGRESS NOTES
SUBJECTIVE:   Gemini Mccabe is a 32 year old female who presents to clinic today for the following health issues:      New Patient/Transfer of Care    Patient has previously been under the care of a provider at [facility].  Patient requests transfer care to me. Patient medications reconciled, PMH and Problem list reviewed and HM updated.    She is here with her [employer] worker.  She has significant mental retardation and is non verbal.  She is on Celexa, Seroquel, Risperdal and PRN Lorazepam to control her behaviors.      Their main concern today is the results of a UA she had done at our clinic about 2 weeks ago.  Apparently her provider from [facility] had ordered this, but since that provider has now left the clinic, they did not hear about the results.  I did check and she has a clear uti, with her urine culture growing out >100,000 e-coli. She has not received any treatment for this.  Her symptoms are behavioral changes.  She also has been acting in pain with urination.  No fevers.    She also has a history of having only one, horseshoe kidney.  She had been seeing Urology yearly through [facility].     Previous seizures disorder - last seizure was in 1998, she is not on any anti seizure medications.                 Problem list and histories reviewed & adjusted, as indicated.  Additional history: as documented    Patient Active Problem List   Diagnosis     Mental retardation     Epilepsy (H)     Absence of menstruation     Constipation     Active autistic disorder     Chronic UTI     CARDIOVASCULAR SCREENING; LDL GOAL LESS THAN 160     Pneumonia     UTI (urinary tract infection)     Past Surgical History:   Procedure Laterality Date     C REIMPLANT URETER,SINGLE URETER  1985       Social History   Substance Use Topics     Smoking status: Never Smoker     Smokeless tobacco: Never Used     Alcohol use No     Family History   Problem Relation Age of Onset     HEART DISEASE Father      MI at age  59     Connective Tissue Disorder Mother      lupus     HEART DISEASE Other      Grandfather         Current Outpatient Prescriptions   Medication Sig Dispense Refill     ascorbic acid (VITAMIN C) 500 MG tablet Take 1 tablet (500 mg) by mouth daily 30 tablet 0     BUTT PASTE - REGULAR (DR LOVE POOP GOOP BUTT PASTE FORMULA) Apply  topically See Admin Instructions. With each change of adult diaper. 60 oz 12     CHILDRENS IBUPROFEN 100 MG/5ML suspension TAKE 30 MLS BY MOUTH EVERY 6 HOURS AS NEEDED FOR FEVER. 480 mL 3     citalopram (CELEXA) 40 MG tablet Take 1 tablet (40 mg) by mouth daily 90 tablet 3     CRANBERRY FRUIT PO Take  by mouth. 500 mg, 1 capsule daily       Disposable Gloves (NITRILE GLOVES MEDIUM) MISC USE EVERY DAY AS NEEDED  100 each 1     Disposable Gloves (VINYL GLOVES) MISC 1 Box as needed 50 each 0     docusate (COLACE) 50 MG/5ML liquid Take 100 mg by mouth daily.         LORazepam (ATIVAN) 0.5 MG tablet Take 1 tablet (0.5 mg) by mouth 2 times daily as needed for agitation or anxiety Not to exceed 2 tabs in 24 hours, try Risperdal first 60 tablet 1     medroxyPROGESTERone (DEPO-PROVERA) 150 MG/ML injection Inject 1 mL (150 mg) into the muscle every 3 months 1 mL 3     multivitamin, therapeutic with minerals (MULTI-VITAMIN) TABS Take 1 tablet by mouth daily 100 tablet 3     Nebulizer nebulization 4 times daily. Use 4 times a day as instructed 1 Device 0     nitroFURantoin, macrocrystal-monohydrate, (MACROBID) 100 MG capsule Take 1 capsule (100 mg) by mouth 2 times daily 14 capsule 0     polyethylene glycol (MIRALAX) powder Take 1 tsp mixed with liquid by mouth once daily       QUEtiapine Fumarate (SEROQUEL PO) Take by mouth daily 100 mg in the morning and 300 mg at bedtime       risperiDONE (RISPERDAL) 0.5 MG tablet Take 1 tablet (0.5 mg) by mouth 2 times daily as needed 30 tablet 1     RISPERIDONE PO Take 0.25 mg by mouth daily        [DISCONTINUED] citalopram (CELEXA) 20 MG tablet Take 1 tablet by  "mouth daily. (Patient taking differently: Take 40 mg by mouth daily ) 90 tablet 3     Allergies   Allergen Reactions     Prevnar      Sulfa Drugs Nausea and Vomiting     BP Readings from Last 3 Encounters:   10/05/18 104/70   07/11/18 100/60   05/08/18 (!) 125/95    Wt Readings from Last 3 Encounters:   10/05/18 167 lb (75.8 kg)   07/11/18 173 lb (78.5 kg)   05/08/18 175 lb (79.4 kg)                    Reviewed and updated as needed this visit by clinical staff  Tobacco  Allergies  Meds  Med Hx  Surg Hx  Fam Hx  Soc Hx      Reviewed and updated as needed this visit by Provider      ROS:  Constitutional, HEENT, cardiovascular, pulmonary, GI, , musculoskeletal, neuro, skin, endocrine and psych systems are negative, except as otherwise noted.    OBJECTIVE:     /70  Pulse 72  Temp 97.7  F (36.5  C) (Tympanic)  Resp 20  Ht 4' 11.5\" (1.511 m)  Wt 167 lb (75.8 kg)  LMP  (LMP Unknown)  Breastfeeding? No  BMI 33.17 kg/m2  Body mass index is 33.17 kg/(m^2).  GENERAL: healthy, alert and no distress  RESP: lungs clear to auscultation - no rales, rhonchi or wheezes  CV: regular rate and rhythm, normal S1 S2, no S3 or S4, no murmur, click or rub, no peripheral edema and peripheral pulses strong  ABDOMEN: soft, nontender, no hepatosplenomegaly, no masses and bowel sounds normal  MS: no gross musculoskeletal defects noted, no edema  PSYCH: non verbal, severe mental retardation     Diagnostic Test Results:  Urine culture from 9/24/18 showed e-coli.       ASSESSMENT/PLAN:         1. Establishing care with new doctor, encounter for    2. Urinary tract infection with hematuria, site unspecified    She has frequent UTIs and I did put a standing order for UAs to be done as needed.    - nitroFURantoin, macrocrystal-monohydrate, (MACROBID) 100 MG capsule; Take 1 capsule (100 mg) by mouth 2 times daily  Dispense: 14 capsule; Refill: 0  - *UA reflex to Microscopic and Culture ([facility] [place]); Standing    3. Active autistic disorder  Chronic, controlled.  No change in treatment plan.   - citalopram (CELEXA) 40 MG tablet; Take 1 tablet (40 mg) by mouth daily  Dispense: 90 tablet; Refill: 3  - LORazepam (ATIVAN) 0.5 MG tablet; Take 1 tablet (0.5 mg) by mouth 2 times daily as needed for agitation or anxiety Not to exceed 2 tabs in 24 hours, try Risperdal first  Dispense: 60 tablet; Refill: 1  - risperiDONE (RISPERDAL) 0.5 MG tablet; Take 1 tablet (0.5 mg) by mouth 2 times daily as needed  Dispense: 30 tablet; Refill: 1    4. Mental retardation  Chronic, controlled.  No change in treatment plan.   - citalopram (CELEXA) 40 MG tablet; Take 1 tablet (40 mg) by mouth daily  Dispense: 90 tablet; Refill: 3  - LORazepam (ATIVAN) 0.5 MG tablet; Take 1 tablet (0.5 mg) by mouth 2 times daily as needed for agitation or anxiety Not to exceed 2 tabs in 24 hours, try Risperdal first  Dispense: 60 tablet; Refill: 1  - risperiDONE (RISPERDAL) 0.5 MG tablet; Take 1 tablet (0.5 mg) by mouth 2 times daily as needed  Dispense: 30 tablet; Refill: 1    5. Nonintractable epilepsy without status epilepticus, unspecified epilepsy type (H)  Chronic, controlled.  No change in treatment plan.       See Patient Instructions    BRANDON Reyes [facility]

## 2018-10-05 NOTE — PATIENT INSTRUCTIONS
If you want her to have a flu shot make a nurse only visit.     There are standing orders for you to drop off urine samples if needed.

## 2018-10-16 ENCOUNTER — DOCUMENTATION ONLY (OUTPATIENT)
Dept: OTHER | Facility: CLINIC | Age: 33
End: 2018-10-16

## 2018-10-26 ENCOUNTER — OFFICE VISIT (OUTPATIENT)
Dept: URGENT CARE | Facility: RETAIL CLINIC | Age: 33
End: 2018-10-26
Payer: MEDICARE

## 2018-10-26 VITALS — TEMPERATURE: 96.4 F | OXYGEN SATURATION: 96 % | RESPIRATION RATE: 16 BRPM | HEART RATE: 80 BPM

## 2018-10-26 DIAGNOSIS — J06.9 VIRAL UPPER RESPIRATORY TRACT INFECTION: Primary | ICD-10-CM

## 2018-10-26 PROCEDURE — 99213 OFFICE O/P EST LOW 20 MIN: CPT | Performed by: INTERNAL MEDICINE

## 2018-10-26 NOTE — MR AVS SNAPSHOT
After Visit Summary   10/26/2018    Gemini Mccabe    MRN: [id number]           Patient Information     Date Of Birth          1985        Visit Information        Provider Department      10/26/2018 1:30 PM Romie Alexis MD [facility]        Today's Diagnoses     Viral upper respiratory tract infection    -  1       Follow-ups after your visit        Your next 10 appointments already scheduled     Oct 30, 2018  4:15 PM CDT   Nurse Only with TUTU ALLEN [facility]   [facility]    [address] [facility] [address] [phone]              Who to contact     You can reach your care team any time of the day by calling [phone].  Notification of test results:  If you have an abnormal lab result, we will notify you by phone as soon as possible.         Additional Information About Your Visit        Care EveryWhere ID     This is your Care EveryWhere ID. This could be used by other organizations to access your [place] medical records  [id number]        Your Vitals Were     Pulse Temperature Respirations Last Period Pulse Oximetry Breastfeeding?    80 96.4  F (35.8  C) (Temporal) 16 (LMP Unknown) 96% No       Blood Pressure from Last 3 Encounters:   10/05/18 104/70   07/11/18 100/60   05/08/18 (!) 125/95    Weight from Last 3 Encounters:   10/05/18 167 lb (75.8 kg)   07/11/18 173 lb (78.5 kg)   05/08/18 175 lb (79.4 kg)              Today, you had the following     No orders found for display       Primary Care Provider Office Phone # Fax #    BRANDON Reyes [phone]       [address] [facility] [address]        Equal Access to Services     NORAH AGUILA : Hadii rodo Vigil, waaxda luqadaha, qaybta kaalmada pema mcfarlane. So [facility] [phone].    ATENCIÓN: Si habla español, tiene a parker disposición servicios gratuitos de asistencia lingüística. Llame al  [phone].    We comply with applicable federal civil rights laws and [place] laws. We do not discriminate on the basis of race, color, national origin, age, disability, sex, sexual orientation, or gender identity.            Thank you!     Thank you for choosing [facility]  for your care. Our goal is always to provide you with excellent care. Hearing back from our patients is one way we can continue to improve our services. Please take a few minutes to complete the written survey that you may receive in the mail after your visit with us. Thank you!             Your Updated Medication List - Protect others around you: Learn how to safely use, store and throw away your medicines at www.disposemymeds.org.          This list is accurate as of 10/26/18  1:46 PM.  Always use your most recent med list.                   Brand Name Dispense Instructions for use Diagnosis    ascorbic acid 500 MG tablet    VITAMIN C    30 tablet    Take 1 tablet (500 mg) by mouth daily    Mental retardation, Active autistic disorder       BUTT PASTE - REGULAR    DR LOVE POOP GOOP BUTT PASTE FORMULA    60 oz    Apply  topically See Admin Instructions. With each change of adult diaper.    Diaper rash       celeXA 40 MG tablet   Generic drug:  citalopram     90 tablet    Take 1 tablet (40 mg) by mouth daily    Active autistic disorder, Mental retardation       CHILDRENS IBUPROFEN 100 MG/5ML suspension   Generic drug:  ibuprofen     480 mL    TAKE 30 MLS BY MOUTH EVERY 6 HOURS AS NEEDED FOR FEVER.    Chronic UTI       CRANBERRY FRUIT PO      Take  by mouth. 500 mg, 1 capsule daily        DEPO-PROVERA 150 MG/ML injection   Generic drug:  medroxyPROGESTERone     1 mL    Inject 1 mL (150 mg) into the muscle every 3 months    Absence of menstruation       docusate 50 MG/5ML liquid    COLACE     Take 100 mg by mouth daily.        LORazepam 0.5 MG tablet    ATIVAN    60 tablet    Take 1 tablet (0.5 mg) by mouth 2 times daily  as needed for agitation or anxiety Not to exceed 2 tabs in 24 hours, try Risperdal first    Active autistic disorder, Mental retardation       MIRALAX powder   Generic drug:  polyethylene glycol      Take 1 tsp mixed with liquid by mouth once daily        multivitamin, therapeutic with minerals Tabs tablet     100 tablet    Take 1 tablet by mouth daily    Unspecified intellectual disabilities, Autistic disorder, current or active state       nebulizer nebulization     1 Device    4 times daily. Use 4 times a day as instructed    Pneumonia       nitroFURantoin (macrocrystal-monohydrate) 100 MG capsule    MACROBID    14 capsule    Take 1 capsule (100 mg) by mouth 2 times daily    Urinary tract infection with hematuria, site unspecified       * RISPERIDONE PO      Take 0.25 mg by mouth daily        * risperiDONE 0.5 MG tablet    risperDAL    30 tablet    Take 1 tablet (0.5 mg) by mouth 2 times daily as needed    Active autistic disorder, Mental retardation       SEROQUEL PO      Take by mouth daily 100 mg in the morning and 300 mg at bedtime        * Vinyl Gloves Misc     50 each    1 Box as needed    Constipation, unspecified constipation type, Active autistic disorder       * NITRILE GLOVES MEDIUM Misc     100 each    USE EVERY DAY AS NEEDED *** MUST MAKE APPT. BEFORE ANY ADDITIONAL REFILLS**    Active autistic disorder, Constipation, unspecified constipation type       * Notice:  This list has 4 medication(s) that are the same as other medications prescribed for you. Read the directions carefully, and ask your doctor or other care provider to review them with you.

## 2018-10-26 NOTE — PROGRESS NOTES
[facility] Care Progress Note        Romie Alexis MD, MPH  10/26/2018        History:      Gemini Mccabe is a pleasant 32 year old year old female with a chief complaint of nasal congestion,clear drainage and cough since yesterday.  No fever or chills.   No dyspnea or chest pain.   No smoking history.   No headache or neck pain.  No GI or  symptoms.   No MSK symptoms.         Assessment and Plan:        URI:.  Discussed supportive care with the patient's caretaker who accompanies her from the group home where the patient resides.  Advised to increase fluid intake and rest.  F/u w PCP in 4-5 days, earlier if symptoms worsen.                   Physical Exam:      Pulse 80  Temp 96.4  F (35.8  C) (Temporal)  Resp 16  LMP  (LMP Unknown)  SpO2 96%  Breastfeeding? No     Constitutional: Patient is in no distress The patient is pleasant and cooperative.   HEENT: Head:  Head is atraumatic, normocephalic.    Eyes: Pupils are equal, round and reactive to light and accomodation.  Sclera is non-icteric. No conjunctival injection, or exudate noted. Extraocular motion is intact. Visual acuity is intact bilaterally.  Ears:  External acoustic canals are patent and clear.  There is no erythema and bulging( exudate)  of the ( R/L ) tympanic membrane(s ).   Nose:  Nasal congestion w clear drainage is noted.  Throat:  Oral mucosa is moist.  No oral lesions are noted. No posterior pharyngeal hyperemia nor exudate noted.     Neck Supple.  There is no cervical lymphadenopathy.  No nuchal rigidity noted.  There is no meningismus.     Cardiovascular: Heart is regular to rate and rhythm.  No murmur is noted.     Lungs: Clear in the anterior and posterior pulmonary fields.   Abdomen: Soft and non-tender.    Back No flank tenderness is noted.   Extremeties No edema, no calf tenderness.   Neuro: No focal deficit.   Skin No petechiae or purpura is noted.  There is no rash.   Mood Normal              Data:      All  new lab and imaging data was reviewed.   Results for orders placed or performed in visit on 09/24/18   UA reflex to Microscopic   Result Value Ref Range    Color Urine Yellow     Appearance Urine Slightly Cloudy     Glucose Urine Negative NEG^Negative mg/dL    Bilirubin Urine Negative NEG^Negative    Ketones Urine Negative NEG^Negative mg/dL    Specific Gravity Urine 1.015 1.003 - 1.035    Blood Urine Negative NEG^Negative    pH Urine 7.5 (H) 5.0 - 7.0 pH    Protein Albumin Urine Negative NEG^Negative mg/dL    Urobilinogen Urine 0.2 0.2 - 1.0 EU/dL    Nitrite Urine Positive (A) NEG^Negative    Leukocyte Esterase Urine Large (A) NEG^Negative    Source Midstream Urine    Urine Microscopic   Result Value Ref Range    WBC Urine 25-50 (A) OTO5^0 - 5 /HPF    RBC Urine O - 2 OTO2^O - 2 /HPF    Bacteria Urine Many (A) NEG^Negative /HPF   Urine Culture Aerobic Bacterial   Result Value Ref Range    Specimen Description Midstream Urine     Special Requests Specimen received in preservative     Culture Micro >100,000 colonies/mL  Escherichia coli   (A)        Susceptibility    Escherichia coli - DARYL     AMPICILLIN >=32 Resistant ug/mL     CEFAZOLIN* 16 Intermediate ug/mL      * Cefazolin DARYL breakpoints are for the treatment of uncomplicated urinary tract infections.  For the treatment of systemic infections, please contact the laboratory for additional testing.     CEFOXITIN <=4 Sensitive ug/mL     CEFTAZIDIME <=1 Sensitive ug/mL     CEFTRIAXONE <=1 Sensitive ug/mL     CIPROFLOXACIN >=4 Resistant ug/mL     GENTAMICIN <=1 Sensitive ug/mL     LEVOFLOXACIN >=8 Resistant ug/mL     NITROFURANTOIN <=16 Sensitive ug/mL     TOBRAMYCIN <=1 Sensitive ug/mL     Trimethoprim/Sulfa <=1/19 Sensitive ug/mL     AMPICILLIN/SULBACTAM >=32 Resistant ug/mL     Piperacillin/Tazo <=4 Sensitive ug/mL     CEFEPIME <=1 Sensitive ug/mL

## 2018-10-28 ENCOUNTER — TRANSFERRED RECORDS (OUTPATIENT)
Dept: HEALTH INFORMATION MANAGEMENT | Facility: CLINIC | Age: 33
End: 2018-10-28

## 2018-10-30 ENCOUNTER — ALLIED HEALTH/NURSE VISIT (OUTPATIENT)
Dept: FAMILY MEDICINE | Facility: OTHER | Age: 33
End: 2018-10-30
Payer: MEDICARE

## 2018-10-30 DIAGNOSIS — N91.2 ABSENCE OF MENSTRUATION: Primary | ICD-10-CM

## 2018-10-30 PROCEDURE — 96372 THER/PROPH/DIAG INJ SC/IM: CPT

## 2018-10-30 NOTE — MR AVS SNAPSHOT
After Visit Summary   10/30/2018    Gemini Mccabe    MRN: [id number]           Patient Information     Date Of Birth          1985        Visit Information        Provider Department      10/30/2018 4:15 PM TUTU ALLEN MA [facility]        Today's Diagnoses     Absence of menstruation    -  1       Follow-ups after your visit        Who to contact     If you have questions or need follow up information about today's clinic visit or your schedule please contact [facility] directly at [phone].  Normal or non-critical lab and imaging results will be communicated to you by MyChart, letter or phone within 4 business days after the clinic has received the results. If you do not hear from us within 7 days, please contact the clinic through MyChart or phone. If you have a critical or abnormal lab result, we will notify you by phone as soon as possible.  Submit refill requests through [facility] or call your pharmacy and they will forward the refill request to us. Please allow 3 business days for your refill to be completed.          Additional Information About Your Visit        Care EveryWhere ID     This is your Care EveryWhere ID. This could be used by other organizations to access your [place] medical records  [id number]        Your Vitals Were     Last Period                   (LMP Unknown)            Blood Pressure from Last 3 Encounters:   10/05/18 104/70   07/11/18 100/60   05/08/18 (!) 125/95    Weight from Last 3 Encounters:   10/05/18 167 lb (75.8 kg)   07/11/18 173 lb (78.5 kg)   05/08/18 175 lb (79.4 kg)              We Performed the Following     INJECTION INTRAMUSCULAR OR SUB-Q     MEDROXYPROGESTERONE INJ        Primary Care Provider Office Phone # Fax #    BRANDON Reyes [phone]       [address] [facility] [address]        Equal Access to Services     KAYCEE AGUILA AH: Gem Vigil, chiki palomo, eloisa flores  pema mcfarlanetanvir michaelaan ah. [facility] [phone].    ATENCIÓN: Si meganla reji, tiene a parker disposición servicios gratuitos de asistencia lingüística. Ortiz al [phone].    We comply with applicable federal civil rights laws and [place] laws. We do not discriminate on the basis of race, color, national origin, age, disability, sex, sexual orientation, or gender identity.            Thank you!     Thank you for choosing [facility]  for your care. Our goal is always to provide you with excellent care. Hearing back from our patients is one way we can continue to improve our services. Please take a few minutes to complete the written survey that you may receive in the mail after your visit with us. Thank you!             Your Updated Medication List - Protect others around you: Learn how to safely use, store and throw away your medicines at www.disposemymeds.org.          This list is accurate as of 10/30/18  4:18 PM.  Always use your most recent med list.                   Brand Name Dispense Instructions for use Diagnosis    ascorbic acid 500 MG tablet    VITAMIN C    30 tablet    Take 1 tablet (500 mg) by mouth daily    Mental retardation, Active autistic disorder       BUTT PASTE - REGULAR    DR LOVE POOP GOOP BUTT PASTE FORMULA    60 oz    Apply  topically See Admin Instructions. With each change of adult diaper.    Diaper rash       celeXA 40 MG tablet   Generic drug:  citalopram     90 tablet    Take 1 tablet (40 mg) by mouth daily    Active autistic disorder, Mental retardation       CHILDRENS IBUPROFEN 100 MG/5ML suspension   Generic drug:  ibuprofen     480 mL    TAKE 30 MLS BY MOUTH EVERY 6 HOURS AS NEEDED FOR FEVER.    Chronic UTI       CRANBERRY FRUIT PO      Take  by mouth. 500 mg, 1 capsule daily        DEPO-PROVERA 150 MG/ML injection   Generic drug:  medroxyPROGESTERone     1 mL    Inject 1 mL (150 mg) into the muscle every 3 months    Absence of menstruation        docusate 50 MG/5ML liquid    COLACE     Take 100 mg by mouth daily.        LORazepam 0.5 MG tablet    ATIVAN    60 tablet    Take 1 tablet (0.5 mg) by mouth 2 times daily as needed for agitation or anxiety Not to exceed 2 tabs in 24 hours, try Risperdal first    Active autistic disorder, Mental retardation       MIRALAX powder   Generic drug:  polyethylene glycol      Take 1 tsp mixed with liquid by mouth once daily        multivitamin, therapeutic with minerals Tabs tablet     100 tablet    Take 1 tablet by mouth daily    Unspecified intellectual disabilities, Autistic disorder, current or active state       nebulizer nebulization     1 Device    4 times daily. Use 4 times a day as instructed    Pneumonia       nitroFURantoin (macrocrystal-monohydrate) 100 MG capsule    MACROBID    14 capsule    Take 1 capsule (100 mg) by mouth 2 times daily    Urinary tract infection with hematuria, site unspecified       * RISPERIDONE PO      Take 0.25 mg by mouth daily        * risperiDONE 0.5 MG tablet    risperDAL    30 tablet    Take 1 tablet (0.5 mg) by mouth 2 times daily as needed    Active autistic disorder, Mental retardation       SEROQUEL PO      Take by mouth daily 100 mg in the morning and 300 mg at bedtime        * Vinyl Gloves Misc     50 each    1 Box as needed    Constipation, unspecified constipation type, Active autistic disorder       * NITRILE GLOVES MEDIUM Misc     100 each    USE EVERY DAY AS NEEDED *** MUST MAKE APPT. BEFORE ANY ADDITIONAL REFILLS**    Active autistic disorder, Constipation, unspecified constipation type       * Notice:  This list has 4 medication(s) that are the same as other medications prescribed for you. Read the directions carefully, and ask your doctor or other care provider to review them with you.

## 2018-10-30 NOTE — NURSING NOTE
The following medication was given:     MEDICATION: Depo Provera 150mg  See medication note.  Patient instructed to remain in clinic for 20 minutes afterwards, and to report any adverse reaction to me immediately.  Prior to injection verified patient identity using patient's name and date of birth.  Sonya Gee MA     10/30/2018

## 2018-11-20 DIAGNOSIS — E56.9 VITAMIN DEFICIENCY: Primary | ICD-10-CM

## 2018-11-21 RX ORDER — MULTIVITAMIN WITH FOLIC ACID 400 MCG
TABLET ORAL
Qty: 28 TABLET | Refills: 5 | Status: SHIPPED | OUTPATIENT
Start: 2018-11-21 | End: 2019-06-17

## 2018-11-21 NOTE — TELEPHONE ENCOUNTER
Routing refill request to provider for review/approval because:  A break in medication, has not been prescribed since 2013  No associated DX code    KATIA Gomez, RN  [facility]

## 2018-11-21 NOTE — TELEPHONE ENCOUNTER
"Requested Prescriptions   Pending Prescriptions Disp Refills     Multiple Vitamin (TAB-A-DAVID) TABS [Pharmacy Med Name: MULTI-VIT (TAB-A-VIT) TABLET]  Last Written Prescription Date:  12/16/13  Last Fill Quantity: 100,  # refills: 3   Last office visit: 10/30/2018 with prescribing provider:  Geraldine   Future Office Visit:   Next 5 appointments (look out 90 days)     Nov 23, 2018  4:15 PM CST   [facility]    [address] [facility] [address]   [phone]            Eduardo 15, 2019  4:15 PM CST   Nurse Only with [facility])    [address] [facility] [address]   [phone]                  28 tablet 0     Sig: TAKE 1 TABLET BY MOUTH ONCE DAILY    Vitamin Supplements (Adult) Protocol Passed    11/20/2018 10:33 AM       Passed - High dose Vitamin D not ordered       Passed - Recent (12 mo) or future (30 days) visit within the authorizing provider's specialty    Patient had office visit in the last 12 months or has a visit in the next 30 days with authorizing provider or within the authorizing provider's specialty.  See \"Patient Info\" tab in inbasket, or \"Choose Columns\" in Meds & Orders section of the refill encounter.                "

## 2018-11-23 ENCOUNTER — ALLIED HEALTH/NURSE VISIT (OUTPATIENT)
Dept: FAMILY MEDICINE | Facility: OTHER | Age: 33
End: 2018-11-23
Payer: MEDICARE

## 2018-11-23 DIAGNOSIS — Z23 NEED FOR PROPHYLACTIC VACCINATION AND INOCULATION AGAINST INFLUENZA: Primary | ICD-10-CM

## 2018-11-23 PROCEDURE — G0008 ADMIN INFLUENZA VIRUS VAC: HCPCS

## 2018-11-23 PROCEDURE — 90686 IIV4 VACC NO PRSV 0.5 ML IM: CPT

## 2018-11-23 NOTE — MR AVS SNAPSHOT
After Visit Summary   11/23/2018    Gemini Mccabe    MRN: [id number]           Patient Information     Date Of Birth          1985        Visit Information        Provider Department      11/23/2018 4:15 PM [facility]        Today's Diagnoses     Need for prophylactic vaccination and inoculation against influenza    -  1       Follow-ups after your visit        Your next 10 appointments already scheduled     Eduardo 15, 2019  4:15 PM CST   Nurse Only with [facility] ([facility])    [address] [facility] [id number] [phone]              Who to contact     If you have questions or need follow up information about today's clinic visit or your schedule please contact [facility] directly at [phone].  Normal or non-critical lab and imaging results will be communicated to you by MyChart, letter or phone within 4 business days after the clinic has received the results. If you do not hear from us within 7 days, please contact the clinic through MyChart or phone. If you have a critical or abnormal lab result, we will notify you by phone as soon as possible.  Submit refill requests through [employer] or call your pharmacy and they will forward the refill request to us. Please allow 3 business days for your refill to be completed.          Additional Information About Your Visit        Care EveryWhere ID     This is your Care EveryWhere ID. This could be used by other organizations to access your [place] medical records  [id number]         Blood Pressure from Last 3 Encounters:   10/05/18 104/70   07/11/18 100/60   05/08/18 (!) 125/95    Weight from Last 3 Encounters:   10/05/18 167 lb (75.8 kg)   07/11/18 173 lb (78.5 kg)   05/08/18 175 lb (79.4 kg)              We Performed the Following     FLU VACCINE, SPLIT VIRUS, IM (QUADRIVALENT) [97553]- >3 YRS     Vaccine Administration, Initial [38856]        Primary Care  Provider Office Phone # Fax #    BRANDON Reyes [phone]       [address] [facility] [address]        Equal Access to Services     KAYCEE AGUILA : Hadii aad ku hadadriano Sodollyali, waaxda luqadaha, qaybta kaalmada adeegyada, pema poseycodi ingramtanvir miltonjayme adam. So [facility] [place] [phone].    ATENCIÓN: Si habla español, tiene a parker disposición servicios gratuitos de asistencia lingüística. Llame al [phone].    We comply with applicable federal civil rights laws and [place] laws. We do not discriminate on the basis of race, color, national origin, age, disability, sex, sexual orientation, or gender identity.            Thank you!     Thank you for choosing [facility]  for your care. Our goal is always to provide you with excellent care. Hearing back from our patients is one way we can continue to improve our services. Please take a few minutes to complete the written survey that you may receive in the mail after your visit with us. Thank you!             Your Updated Medication List - Protect others around you: Learn how to safely use, store and throw away your medicines at www.disposemymeds.org.          This list is accurate as of 11/23/18  4:15 PM.  Always use your most recent med list.                   Brand Name Dispense Instructions for use Diagnosis    ascorbic acid 500 MG tablet    VITAMIN C    30 tablet    Take 1 tablet (500 mg) by mouth daily    Mental retardation, Active autistic disorder       BUTT PASTE - REGULAR    DR LOVE POOP GOOP BUTT PASTE FORMULA    60 oz    Apply  topically See Admin Instructions. With each change of adult diaper.    Diaper rash       celeXA 40 MG tablet   Generic drug:  citalopram     90 tablet    Take 1 tablet (40 mg) by mouth daily    Active autistic disorder, Mental retardation       CHILDRENS IBUPROFEN 100 MG/5ML suspension   Generic drug:  ibuprofen     480 mL    TAKE 30 MLS BY MOUTH EVERY 6 HOURS AS NEEDED FOR FEVER.    Chronic  UTI       CRANBERRY FRUIT PO      Take  by mouth. 500 mg, 1 capsule daily        DEPO-PROVERA 150 MG/ML injection   Generic drug:  medroxyPROGESTERone     1 mL    Inject 1 mL (150 mg) into the muscle every 3 months    Absence of menstruation       docusate 50 MG/5ML liquid    COLACE     Take 100 mg by mouth daily.        LORazepam 0.5 MG tablet    ATIVAN    60 tablet    Take 1 tablet (0.5 mg) by mouth 2 times daily as needed for agitation or anxiety Not to exceed 2 tabs in 24 hours, try Risperdal first    Active autistic disorder, Mental retardation       MIRALAX powder   Generic drug:  polyethylene glycol      Take 1 tsp mixed with liquid by mouth once daily        multivitamin, therapeutic with minerals Tabs tablet     100 tablet    Take 1 tablet by mouth daily    Unspecified intellectual disabilities, Autistic disorder, current or active state       nebulizer nebulization     1 Device    4 times daily. Use 4 times a day as instructed    Pneumonia       nitroFURantoin (macrocrystal-monohydrate) 100 MG capsule    MACROBID    14 capsule    Take 1 capsule (100 mg) by mouth 2 times daily    Urinary tract infection with hematuria, site unspecified       * RISPERIDONE PO      Take 0.25 mg by mouth daily        * risperiDONE 0.5 MG tablet    risperDAL    30 tablet    Take 1 tablet (0.5 mg) by mouth 2 times daily as needed    Active autistic disorder, Mental retardation       SEROQUEL PO      Take by mouth daily 100 mg in the morning and 300 mg at bedtime        TAB-A-DAVID Tabs     28 tablet    TAKE 1 TABLET BY MOUTH ONCE DAILY    Vitamin deficiency       * Vinyl Gloves Misc     50 each    1 Box as needed    Constipation, unspecified constipation type, Active autistic disorder       * NITRILE GLOVES MEDIUM Misc     100 each    USE EVERY DAY AS NEEDED *** MUST MAKE APPT. BEFORE ANY ADDITIONAL REFILLS**    Active autistic disorder, Constipation, unspecified constipation type       * Notice:  This list has 4 medication(s)  that are the same as other medications prescribed for you. Read the directions carefully, and ask your doctor or other care provider to review them with you.

## 2018-11-23 NOTE — PROGRESS NOTES

## 2019-01-08 DIAGNOSIS — F79 MENTAL RETARDATION: ICD-10-CM

## 2019-01-08 DIAGNOSIS — F84.0 ACTIVE AUTISTIC DISORDER: ICD-10-CM

## 2019-01-08 RX ORDER — ASCORBIC ACID 500 MG
500 TABLET ORAL DAILY
Qty: 30 TABLET | Refills: 0 | Status: SHIPPED | OUTPATIENT
Start: 2019-01-08 | End: 2019-01-28

## 2019-01-08 NOTE — TELEPHONE ENCOUNTER
Vitamin C       Last Written Prescription Date:  4/3/17  Last Fill Quantity: 30,   # refills: 0  Last Office Visit: 10/5/18  Future Office visit:    Next 5 appointments (look out 90 days)    Eduardo 15, 2019  4:15 PM CST  Nurse Only with TUTU ALLEN MA  [facility]) [address] [facility] [address]  [phone]   Mar 06, 2019  1:00 PM CST  Pre-Op physical with BRANDON Reyes CNP  [facility] [address] [facility] [address]  [phone]           Routing refill request to provider for review/approval because:  Drug not on the [facility], [facility] or  Health refill protocol or controlled substance

## 2019-01-15 ENCOUNTER — ALLIED HEALTH/NURSE VISIT (OUTPATIENT)
Dept: FAMILY MEDICINE | Facility: OTHER | Age: 34
End: 2019-01-15
Payer: MEDICARE

## 2019-01-15 VITALS
BODY MASS INDEX: 34.78 KG/M2 | WEIGHT: 175.13 LBS | SYSTOLIC BLOOD PRESSURE: 126 MMHG | DIASTOLIC BLOOD PRESSURE: 68 MMHG

## 2019-01-15 PROCEDURE — 96372 THER/PROPH/DIAG INJ SC/IM: CPT

## 2019-01-15 RX ORDER — MEDROXYPROGESTERONE ACETATE 150 MG/ML
150 INJECTION, SUSPENSION INTRAMUSCULAR
Status: COMPLETED | OUTPATIENT
Start: 2019-01-15 | End: 2019-04-17

## 2019-01-15 RX ADMIN — MEDROXYPROGESTERONE ACETATE 150 MG: 150 INJECTION, SUSPENSION INTRAMUSCULAR at 16:05

## 2019-01-28 DIAGNOSIS — F79 MENTAL RETARDATION: ICD-10-CM

## 2019-01-28 DIAGNOSIS — F84.0 ACTIVE AUTISTIC DISORDER: ICD-10-CM

## 2019-01-28 RX ORDER — ASCORBIC ACID 500 MG
TABLET ORAL
Qty: 28 TABLET | Refills: 0 | Status: SHIPPED | OUTPATIENT
Start: 2019-01-28 | End: 2019-03-19

## 2019-01-28 NOTE — TELEPHONE ENCOUNTER
Vitamin C       Last Written Prescription Date:  1/8/19  Last Fill Quantity: 30,   # refills: 0  Last Office Visit: 10/5/18  Future Office visit:    Next 5 appointments (look out 90 days)    Mar 06, 2019  1:00 PM CST  Pre-Op physical with BRANDON Reyes CNP  [facility] [address] [facility] [address]  [phone]           Routing refill request to provider for review/approval because:  Drug not on the [facility], [facility] or  Health refill protocol or controlled substance

## 2019-03-06 ENCOUNTER — OFFICE VISIT (OUTPATIENT)
Dept: FAMILY MEDICINE | Facility: OTHER | Age: 34
End: 2019-03-06
Payer: MEDICARE

## 2019-03-06 VITALS
TEMPERATURE: 97 F | HEART RATE: 95 BPM | HEIGHT: 60 IN | SYSTOLIC BLOOD PRESSURE: 130 MMHG | WEIGHT: 167.8 LBS | OXYGEN SATURATION: 96 % | DIASTOLIC BLOOD PRESSURE: 74 MMHG | BODY MASS INDEX: 32.94 KG/M2 | RESPIRATION RATE: 18 BRPM

## 2019-03-06 DIAGNOSIS — N89.8 VAGINAL ITCHING: ICD-10-CM

## 2019-03-06 DIAGNOSIS — Q63.1 HORSESHOE KIDNEY: ICD-10-CM

## 2019-03-06 DIAGNOSIS — K02.9 DENTAL DECAY: ICD-10-CM

## 2019-03-06 DIAGNOSIS — Z01.818 PREOP GENERAL PHYSICAL EXAM: Primary | ICD-10-CM

## 2019-03-06 LAB
ANION GAP SERPL CALCULATED.3IONS-SCNC: 7 MMOL/L (ref 3–14)
BUN SERPL-MCNC: 11 MG/DL (ref 7–30)
CALCIUM SERPL-MCNC: 8.2 MG/DL (ref 8.5–10.1)
CHLORIDE SERPL-SCNC: 107 MMOL/L (ref 94–109)
CO2 SERPL-SCNC: 28 MMOL/L (ref 20–32)
CREAT SERPL-MCNC: 0.84 MG/DL (ref 0.52–1.04)
GFR SERPL CREATININE-BSD FRML MDRD: >90 ML/MIN/{1.73_M2}
GLUCOSE SERPL-MCNC: 83 MG/DL (ref 70–99)
POTASSIUM SERPL-SCNC: 4 MMOL/L (ref 3.4–5.3)
SODIUM SERPL-SCNC: 142 MMOL/L (ref 133–144)

## 2019-03-06 PROCEDURE — 99214 OFFICE O/P EST MOD 30 MIN: CPT | Performed by: NURSE PRACTITIONER

## 2019-03-06 PROCEDURE — 80048 BASIC METABOLIC PNL TOTAL CA: CPT | Performed by: NURSE PRACTITIONER

## 2019-03-06 PROCEDURE — 36415 COLL VENOUS BLD VENIPUNCTURE: CPT | Performed by: NURSE PRACTITIONER

## 2019-03-06 RX ORDER — FLUCONAZOLE 150 MG/1
150 TABLET ORAL ONCE
Qty: 1 TABLET | Refills: 0 | Status: SHIPPED | OUTPATIENT
Start: 2019-03-06 | End: 2019-06-18

## 2019-03-06 ASSESSMENT — PAIN SCALES - GENERAL: PAINLEVEL: NO PAIN (0)

## 2019-03-06 ASSESSMENT — MIFFLIN-ST. JEOR: SCORE: 1386.39

## 2019-03-06 NOTE — PROGRESS NOTES
[facility]  [address] [facility] [address]  [phone]  Dept: [phone]    PRE-OP EVALUATION:  Today's date: 3/6/2019    Gemini Mccabe (: 1985) presents for pre-operative evaluation assessment as requested by Dr. ramsey.  She requires evaluation and anesthesia risk assessment prior to undergoing surgery/procedure for treatment of Dental cleaning .    Fax number for surgical facility: unknown  Primary Physician: Yissel Chandler  Type of Anesthesia Anticipated: General    Patient has a Health Care Directive or Living Will:  NO    Preop Questions 3/6/2019   Who is doing your surgery? [facility]   What are you having done? Dental work   Date of Surgery/Procedure: 19   Facility or Hospital where procedure/surgery will be performed: [facility]   1.  Do you have a history of Heart attack, stroke, stent, coronary bypass surgery, or other heart surgery? No   2.  Do you ever have any pain or discomfort in your chest? No   3.  Do you have a history of  Heart Failure? No   4.   Are you troubled by shortness of breath when:  walking on a level surface, or up a slight hill, or at night? No   5.  Do you currently have a cold, bronchitis or other respiratory infection? No   6.  Do you have a cough, shortness of breath, or wheezing? No   7.  Do you sometimes get pains in the calves of your legs when you walk? No   8. Do you or anyone in your family have previous history of blood clots? UNKNOWN -    9.  Do you or does anyone in your family have a serious bleeding problem such as prolonged bleeding following surgeries or cuts? UNKNOWN -    10. Have you ever had problems with anemia or been told to take iron pills? No   11. Have you had any abnormal blood loss such as black, tarry or bloody stools, or abnormal vaginal bleeding? No   12. Have you ever had a blood transfusion? UNKNOWN -    13. Have you or any of your relatives ever had problems with anesthesia? UNKNOWN -    14. Do  you have sleep apnea, excessive snoring or daytime drowsiness? No   15. Do you have any prosthetic heart valves? No   16. Do you have prosthetic joints? No   17. Is there any chance that you may be pregnant? No         HPI:     HPI related to upcoming procedure: needs dental work with sedation.  She has had sedation previously without any complications.  She is severely developmentally delayed and is non-verbal.  She is here with a staff member from her group home.      Vaginal Symptoms      Duration: few weeks    Description  itching    Intensity:  mild    Accompanying signs and symptoms (fever/dysuria/abdominal or back pain): None    History  Sexually active: never  Possibility of pregnancy: No  Recent antibiotic use: YES- frequent UTIs - last antibiotics were 3 months ago    Precipitating or alleviating factors: None    Therapies tried and outcome: none       She has been scratching her vaginal area for the past few weeks.  Staff members have not noticed any vaginal discharge or erythema with bathing.  She does not seem to be in any pain, is just scratching quite a bit.  She has a history of frequent UTIs and those typically present with behavorial changes, but she has not had any of those recently.         See problem list for active medical problems.  Problems all longstanding and stable, except as noted/documented.  See ROS for pertinent symptoms related to these conditions.                                                                                                                                                          .    MEDICAL HISTORY:     Patient Active Problem List    Diagnosis Date Noted     Pneumonia 11/12/2010     Priority: High     Horseshoe kidney 10/05/2018     Priority: Medium     Frequent UTI 10/05/2018     Priority: Medium     UTI (urinary tract infection) 05/27/2011     Priority: Medium     seems to be resolved.  suspected renal calculi       CARDIOVASCULAR SCREENING; LDL GOAL LESS THAN  160 10/31/2010     Priority: Medium     Chronic UTI 01/16/2010     Priority: Medium     Active autistic disorder      Priority: Medium     Problem list name updated by automated process. Provider to review       Mental retardation 11/10/2003     Priority: Medium     Problem list name updated by automated process. Provider to review       Epilepsy (H) 11/10/2003     Priority: Medium     Problem list name updated by automated process. Provider to review       Absence of menstruation 11/10/2003     Priority: Medium     Constipation 11/10/2003     Priority: Medium     Problem list name updated by automated process. 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     Unspecified constipation      Unspecified epilepsy without mention of intractable epilepsy     Seizure disorder-last 1998, not on meds     Unspecified intellectual disabilities     Non verbal     Past Surgical History:   Procedure Laterality Date     C REIMPLANT URETER,SINGLE URETER  1985       OTC products: None, except as noted above    Allergies   Allergen Reactions     Prevnar      Sulfa Drugs Nausea and Vomiting      Latex Allergy: NO    Social History     Tobacco Use     Smoking status: Never Smoker     Smokeless tobacco: Never Used   Substance Use Topics     Alcohol use: No     History   Drug Use No       REVIEW OF SYSTEMS:   CONSTITUTIONAL: NEGATIVE for fever, chills, change in weight  INTEGUMENTARY/SKIN: NEGATIVE for worrisome rashes, moles or lesions  EYES: NEGATIVE for vision changes or irritation  ENT/MOUTH: NEGATIVE for ear, mouth and throat problems  RESP: NEGATIVE for significant cough or SOB  BREAST: NEGATIVE for masses, tenderness or discharge  CV: NEGATIVE for chest pain, palpitations or peripheral edema  GI: NEGATIVE for nausea,  "abdominal pain, heartburn, or change in bowel habits   female: NEGATIVE for dysuria, vaginal discharge.  POSITIVE for vaginal itching as above   MUSCULOSKELETAL: NEGATIVE for significant arthralgias or myalgia  NEURO: NEGATIVE for weakness, dizziness or paresthesias  ENDOCRINE: NEGATIVE for temperature intolerance, skin/hair changes  HEME: NEGATIVE for bleeding problems  PSYCHIATRIC: NEGATIVE for changes in mood or affect    EXAM:   /74   Pulse 95   Temp 97  F (36.1  C) (Temporal)   Resp 18   Ht 1.53 m (5' 0.24\")   Wt 76.1 kg (167 lb 12.8 oz)   SpO2 96%   BMI 32.51 kg/m      GENERAL APPEARANCE: healthy, alert and no distress     EYES: EOMI, PERRL     HENT: ear canals and TM's normal and nose and mouth without ulcers or lesions     NECK: no adenopathy, no asymmetry, masses, or scars and thyroid normal to palpation     RESP: lungs clear to auscultation - no rales, rhonchi or wheezes     CV: regular rates and rhythm, normal S1 S2, no S3 or S4 and no murmur, click or rub     ABDOMEN:  soft, nontender, no HSM or masses and bowel sounds normal     MS: extremities normal- no gross deformities noted, no evidence of inflammation in joints, FROM in all extremities.     SKIN: no suspicious lesions or rashes     NEURO: non verbal, severe developmental delay      PSYCH: mentation appears normal. and affect normal/bright     LYMPHATICS: No cervical adenopathy    DIAGNOSTICS:   Pending     Recent Labs   Lab Test 03/07/17  1458 07/08/16 03/27/15  1820  04/02/12  0823   HGB 14.3  --  14.7  --  14.4     --  153  --  211   NA  --   --  139  --  142   POTASSIUM  --  4.1 3.6   < > 4.4   CR  --  0.89 0.89   < > 0.82    < > = values in this interval not displayed.     Office Visit on 03/06/2019   Component Date Value Ref Range Status     Sodium 03/06/2019 142  133 - 144 mmol/L Final     Potassium 03/06/2019 4.0  3.4 - 5.3 mmol/L Final     Chloride 03/06/2019 107  94 - 109 mmol/L Final     Carbon Dioxide 03/06/2019 28 "  20 - 32 mmol/L Final     Anion Gap 03/06/2019 7  3 - 14 mmol/L Final     Glucose 03/06/2019 83  70 - 99 mg/dL Final     Urea Nitrogen 03/06/2019 11  7 - 30 mg/dL Final     Creatinine 03/06/2019 0.84  0.52 - 1.04 mg/dL Final     GFR Estimate 03/06/2019 >90  >60 mL/min/[1.73_m2] Final    Comment: Non  GFR Calc  Starting 12/18/2018, serum creatinine based estimated GFR (eGFR) will be   calculated using the Chronic Kidney Disease Epidemiology Collaboration   (CKD-EPI) equation.       GFR Estimate If Black 03/06/2019 >90  >60 mL/min/[1.73_m2] Final    Comment:  GFR Calc  Starting 12/18/2018, serum creatinine based estimated GFR (eGFR) will be   calculated using the Chronic Kidney Disease Epidemiology Collaboration   (CKD-EPI) equation.       Calcium 03/06/2019 8.2* 8.5 - 10.1 mg/dL Final            IMPRESSION:   Reason for surgery/procedure: dental work with sedation     The proposed surgical procedure is considered LOW risk.    REVISED CARDIAC RISK INDEX  The patient has the following serious cardiovascular risks for perioperative complications such as (MI, PE, VFib and 3  AV Block):  No serious cardiac risks  INTERPRETATION: 0 risks: Class I (very low risk - 0.4% complication rate)    The patient has the following additional risks for perioperative complications:  No identified additional risks      ICD-10-CM    1. Preop general physical exam Z01.818    2. Dental decay K02.9    3. Horseshoe kidney Q63.1 Basic metabolic panel  (Ca, Cl, CO2, Creat, Gluc, K, Na, BUN)   4. Vaginal itching N89.8 fluconazole (DIFLUCAN) 150 MG tablet       RECOMMENDATIONS:       --Patient is to take all scheduled medications on the day of surgery EXCEPT for modifications listed below.    APPROVAL GIVEN to proceed with proposed procedure, without further diagnostic evaluation    Will treat with oral Diflucan for presumptive yeast vaginitis given her symptoms.  I do not think she would tolerate a pelvic exam  very well given her severe developmental delay, but will need to attempt this if her symptoms fail to resolve.  The group home is also going to bring back a urine sample to check for infection.      Signed Electronically by: BRANDON Reyes CNP    Copy of this evaluation report is provided to requesting physician.    [facility] Preop Guidelines    Revised Cardiac Risk Index

## 2019-03-06 NOTE — LETTER
[facility]  [address] [facility] [address]  [phone]        March 7, 2019    Gemini Mccabe  [address]   [place] [address]          Dear Gemini,    LAB RESULTS:     The results of your recent Tests were Your results were all normal or expected.  Please continue your current plan of care. .  If you have any further questions or problems, please contact our office at [phone].        Sincerely,        Yissel Chandler, CNP

## 2019-03-07 NOTE — RESULT ENCOUNTER NOTE
Please notify patient, call or letter    Your results were all normal or expected.  Please continue your current plan of care.     Electronically signed by Yissel Chandler CNP.

## 2019-03-11 DIAGNOSIS — R31.9 URINARY TRACT INFECTION WITH HEMATURIA, SITE UNSPECIFIED: ICD-10-CM

## 2019-03-11 DIAGNOSIS — N39.0 URINARY TRACT INFECTION WITH HEMATURIA, SITE UNSPECIFIED: ICD-10-CM

## 2019-03-11 LAB
ALBUMIN UR-MCNC: NEGATIVE MG/DL
APPEARANCE UR: ABNORMAL
BACTERIA #/AREA URNS HPF: ABNORMAL /HPF
BILIRUB UR QL STRIP: NEGATIVE
COLOR UR AUTO: YELLOW
GLUCOSE UR STRIP-MCNC: NEGATIVE MG/DL
HGB UR QL STRIP: NEGATIVE
KETONES UR STRIP-MCNC: NEGATIVE MG/DL
LEUKOCYTE ESTERASE UR QL STRIP: ABNORMAL
NITRATE UR QL: NEGATIVE
PH UR STRIP: 7 PH (ref 5–7)
RBC #/AREA URNS AUTO: ABNORMAL /HPF
SOURCE: ABNORMAL
SP GR UR STRIP: 1.01 (ref 1–1.03)
UROBILINOGEN UR STRIP-ACNC: 1 EU/DL (ref 0.2–1)
WBC #/AREA URNS AUTO: ABNORMAL /HPF

## 2019-03-11 PROCEDURE — 81001 URINALYSIS AUTO W/SCOPE: CPT | Performed by: NURSE PRACTITIONER

## 2019-03-11 PROCEDURE — 87086 URINE CULTURE/COLONY COUNT: CPT | Performed by: NURSE PRACTITIONER

## 2019-03-11 PROCEDURE — 87088 URINE BACTERIA CULTURE: CPT | Performed by: NURSE PRACTITIONER

## 2019-03-11 PROCEDURE — 87186 SC STD MICRODIL/AGAR DIL: CPT | Performed by: NURSE PRACTITIONER

## 2019-03-12 ENCOUNTER — TRANSFERRED RECORDS (OUTPATIENT)
Dept: HEALTH INFORMATION MANAGEMENT | Facility: CLINIC | Age: 34
End: 2019-03-12

## 2019-03-13 ENCOUNTER — TELEPHONE (OUTPATIENT)
Dept: FAMILY MEDICINE | Facility: OTHER | Age: 34
End: 2019-03-13

## 2019-03-13 DIAGNOSIS — R31.9 URINARY TRACT INFECTION WITH HEMATURIA, SITE UNSPECIFIED: Primary | ICD-10-CM

## 2019-03-13 DIAGNOSIS — N39.0 URINARY TRACT INFECTION WITH HEMATURIA, SITE UNSPECIFIED: Primary | ICD-10-CM

## 2019-03-13 LAB
BACTERIA SPEC CULT: ABNORMAL
BACTERIA SPEC CULT: ABNORMAL
Lab: ABNORMAL
SPECIMEN SOURCE: ABNORMAL

## 2019-03-13 RX ORDER — NITROFURANTOIN 25; 75 MG/1; MG/1
100 CAPSULE ORAL 2 TIMES DAILY
Qty: 14 CAPSULE | Refills: 0 | Status: SHIPPED | OUTPATIENT
Start: 2019-03-13 | End: 2019-06-18

## 2019-03-13 RX ORDER — SULFAMETHOXAZOLE/TRIMETHOPRIM 800-160 MG
1 TABLET ORAL 2 TIMES DAILY
Qty: 6 TABLET | Refills: 0 | Status: SHIPPED | OUTPATIENT
Start: 2019-03-13 | End: 2019-03-13 | Stop reason: ALTCHOICE

## 2019-03-13 NOTE — TELEPHONE ENCOUNTER
Called and LM for patient caregiver to call back in regards to message below. Please relay message to them.     Berna Dawn MA

## 2019-03-13 NOTE — TELEPHONE ENCOUNTER
----- Message from BRANDON Reyes CNP sent at 3/13/2019  8:12 AM CDT -----  Please call and let her caregivers know she has a bladder infection.  I have sent over Bactrim to treat this.  Follow up if symptoms fail to resolve as expected.     Electronically signed by Yissel Chandler CNP.

## 2019-03-19 ENCOUNTER — TELEPHONE (OUTPATIENT)
Dept: FAMILY MEDICINE | Facility: OTHER | Age: 34
End: 2019-03-19

## 2019-03-19 DIAGNOSIS — F79 MENTAL RETARDATION: ICD-10-CM

## 2019-03-19 DIAGNOSIS — F84.0 ACTIVE AUTISTIC DISORDER: ICD-10-CM

## 2019-03-19 RX ORDER — ASCORBIC ACID 500 MG
TABLET ORAL
Qty: 28 TABLET | Refills: 0 | Status: SHIPPED | OUTPATIENT
Start: 2019-03-19 | End: 2019-06-17

## 2019-03-19 NOTE — TELEPHONE ENCOUNTER
Vitamin C 500 MG       Last Written Prescription Date:  1/28/19  Last Fill Quantity: 28,   # refills: 0  Last Office Visit: 3/6/19  Future Office visit:    Next 5 appointments (look out 90 days)    Apr 12, 2019  9:00 AM CDT  [facility] with [facility]U [facility]EN [facility]  [facility] [address] [facility] [address]  [phone]           Routing refill request to provider for review/approval because:  Drug not on the [facility], [facility] or [facility] refill protocol or controlled substance

## 2019-04-02 ENCOUNTER — TELEPHONE (OUTPATIENT)
Dept: FAMILY MEDICINE | Facility: OTHER | Age: 34
End: 2019-04-02

## 2019-04-02 NOTE — TELEPHONE ENCOUNTER
Reason for Call:  Form, our goal is to have forms completed with 72 hours, however, some forms may require a visit or additional information.    Type of letter, form or note:  [employer]    Who is the form from?: [employer] (if other please explain)    Where did the form come from: form was faxed in    What clinic location was the form placed at?: [place]    Where the form was placed: [facility]    What number is listed as a contact on the form?:        Additional comments: Please fill out form and fax back. Call pt when done and faxed.    Call taken on 4/2/2019 at 1:21 PM by Lotus Akhtar

## 2019-04-08 ENCOUNTER — APPOINTMENT (OUTPATIENT)
Dept: GENERAL RADIOLOGY | Facility: CLINIC | Age: 34
End: 2019-04-08
Attending: NURSE PRACTITIONER
Payer: MEDICARE

## 2019-04-08 ENCOUNTER — HOSPITAL ENCOUNTER (EMERGENCY)
Facility: CLINIC | Age: 34
Discharge: HOME | End: 2019-04-08
Attending: NURSE PRACTITIONER | Admitting: NURSE PRACTITIONER
Payer: MEDICARE

## 2019-04-08 VITALS
HEART RATE: 90 BPM | RESPIRATION RATE: 14 BRPM | DIASTOLIC BLOOD PRESSURE: 88 MMHG | TEMPERATURE: 98.2 F | WEIGHT: 173.3 LBS | SYSTOLIC BLOOD PRESSURE: 132 MMHG | OXYGEN SATURATION: 98 % | BODY MASS INDEX: 33.58 KG/M2

## 2019-04-08 DIAGNOSIS — M79.662 PAIN OF LEFT LOWER LEG: ICD-10-CM

## 2019-04-08 PROCEDURE — 73552 X-RAY EXAM OF FEMUR 2/>: CPT | Mod: LT

## 2019-04-08 PROCEDURE — A9270 NON-COVERED ITEM OR SERVICE: HCPCS | Performed by: NURSE PRACTITIONER

## 2019-04-08 PROCEDURE — 25000132 ZZH RX MED GY IP 250 OP 250 PS 637: Performed by: NURSE PRACTITIONER

## 2019-04-08 PROCEDURE — 99285 EMERGENCY DEPT VISIT HI MDM: CPT | Performed by: NURSE PRACTITIONER

## 2019-04-08 PROCEDURE — 99284 EMERGENCY DEPT VISIT MOD MDM: CPT | Mod: Z6 | Performed by: NURSE PRACTITIONER

## 2019-04-08 PROCEDURE — 73630 X-RAY EXAM OF FOOT: CPT | Mod: LT

## 2019-04-08 PROCEDURE — 73502 X-RAY EXAM HIP UNI 2-3 VIEWS: CPT

## 2019-04-08 PROCEDURE — 73590 X-RAY EXAM OF LOWER LEG: CPT | Mod: LT

## 2019-04-08 RX ORDER — IBUPROFEN 600 MG/1
600 TABLET, FILM COATED ORAL ONCE
Status: COMPLETED | OUTPATIENT
Start: 2019-04-08 | End: 2019-04-08

## 2019-04-08 RX ADMIN — IBUPROFEN 600 MG: 600 TABLET ORAL at 18:43

## 2019-04-08 NOTE — ED AVS SNAPSHOT
[facility]  [address] [place] [address]  Phone:  [phone]  Fax:  [phone]                                    Gemini Mccabe   MRN: [id number]    Department:  [facility]   Date of Visit:  4/8/2019           After Visit Summary Signature Page    I have received my discharge instructions, and my questions have been answered. I have discussed any challenges I see with this plan with the nurse or doctor.    ..........................................................................................................................................  Patient/Patient Representative Signature      ..........................................................................................................................................  Patient Representative Print Name and Relationship to Patient    ..................................................               ................................................  Date                                   Time    ..........................................................................................................................................  Reviewed by Signature/Title    ...................................................              ..............................................  Date                                               Time          22EPIC Rev 08/18

## 2019-04-08 NOTE — ED TRIAGE NOTES
Patient here with left leg pain, she is non verbal but she has been favoring the left side and not wanting to walk.

## 2019-04-08 NOTE — ED PROVIDER NOTES
History     Chief Complaint   Patient presents with     Leg Pain     HPI  Gemini Mccabe is a 33 year old female who presents to the ED today with concerns for left leg or foot pain.  No known injury. Patient here with her guardian, resides at a group home, no reports of trauma or recent fall. No fever, change in bowel or bladder, no vomiting or diarrhea, no concerns for constipation.  Was given one Tylenol earlier today.  Patient has been favoring her left leg, not wanting to walk, pointing at left foot and left knee.     Allergies:  Allergies   Allergen Reactions     Prevnar      Sulfa Drugs Nausea and Vomiting       Problem List:    Patient Active Problem List    Diagnosis Date Noted     Pneumonia 11/12/2010     Priority: High     Horseshoe kidney 10/05/2018     Priority: Medium     Frequent UTI 10/05/2018     Priority: Medium     UTI (urinary tract infection) 05/27/2011     Priority: Medium     seems to be resolved.  suspected renal calculi       CARDIOVASCULAR SCREENING; LDL GOAL LESS THAN 160 10/31/2010     Priority: Medium     Chronic UTI 01/16/2010     Priority: Medium     Active autistic disorder      Priority: Medium     Problem list name updated by automated process. Provider to review       Mental retardation 11/10/2003     Priority: Medium     Problem list name updated by automated process. Provider to review       Epilepsy (H) 11/10/2003     Priority: Medium     Problem list name updated by automated process. Provider to review       Absence of menstruation 11/10/2003     Priority: Medium     Constipation 11/10/2003     Priority: Medium     Problem list name updated by automated process. Provider to review          Past Medical History:    Past Medical History:   Diagnosis Date     Absence of menstruation      Autistic disorder, current or active state      Chronic UTI      Other specified congenital anomaly of kidney      Unspecified constipation      Unspecified epilepsy without mention of  intractable epilepsy      Unspecified intellectual disabilities        Past Surgical History:    Past Surgical History:   Procedure Laterality Date     C REIMPLANT URETER,SINGLE URETER  1985       Family History:    Family History   Problem Relation Age of Onset     Heart Disease Father         MI at age 59     Connective Tissue Disorder Mother         lupus     Heart Disease Other         Grandfather       Social History:  Marital Status:  Single [1]  Social History     Tobacco Use     Smoking status: Never Smoker     Smokeless tobacco: Never Used   Substance Use Topics     Alcohol use: No     Drug use: No        Medications:      BUTT PASTE - REGULAR (DR LOVE POOP GOOP BUTT PASTE FORMULA)   CHILDRENS IBUPROFEN 100 MG/5ML suspension   citalopram (CELEXA) 40 MG tablet   CRANBERRY FRUIT PO   Disposable Gloves (NITRILE GLOVES MEDIUM) MISC   Disposable Gloves (VINYL GLOVES) MISC   docusate (COLACE) 50 MG/5ML liquid   LORazepam (ATIVAN) 0.5 MG tablet   medroxyPROGESTERone (DEPO-PROVERA) 150 MG/ML injection   Multiple Vitamin (TAB-A-DAVID) TABS   multivitamin, therapeutic with minerals (MULTI-VITAMIN) TABS   Nebulizer nebulization   polyethylene glycol (MIRALAX) powder   QUEtiapine Fumarate (SEROQUEL PO)   risperiDONE (RISPERDAL) 0.5 MG tablet   RISPERIDONE PO   vitamin C (ASCORBIC ACID) 500 MG tablet         Review of Systems   Unable to perform ROS: Patient nonverbal       Physical Exam   BP: 132/88  Pulse: 90  Temp: 98.2  F (36.8  C)  Resp: 14  Weight: 78.6 kg (173 lb 4.8 oz)  SpO2: 98 %      Physical Exam   Constitutional: She is oriented to person, place, and time. She appears well-developed and well-nourished.   Non-verbal   HENT:   Head: Normocephalic.   Eyes: EOM are normal.   Neck: Normal range of motion.   Cardiovascular: Normal rate.   Pulmonary/Chest: Effort normal and breath sounds normal.   Abdominal: Soft. Bowel sounds are normal.   Musculoskeletal: Normal range of motion. She exhibits no tenderness or  deformity.   Neurological: She is alert and oriented to person, place, and time.   Skin: Skin is warm and dry.   Psychiatric: She has a normal mood and affect.       ED Course     Procedures    Results for orders placed or performed during the hospital encounter of 04/08/19 (from the past 24 hour(s))   Foot XR, G/E 3 views, left    Narrative    XR FOOT LT G/E 3 VW 4/8/2019 7:53 PM     HISTORY: pain      Impression    IMPRESSION: No apparent fracture or dislocation.    CAROLINA MCCLELLAN MD   XR Tibia & Fibula Left 2 Views    Narrative    XR TIBIA & FIBULA LT 2 VW 4/8/2019 7:54 PM     HISTORY: pain with walking      Impression    IMPRESSION: Negative exam.    CAROLINA MCCLELLAN MD   XR Femur Left 2 Views    Narrative    XR FEMUR LT 2 VW 4/8/2019 7:55 PM     HISTORY: pain with walking      Impression    IMPRESSION: Negative exam.    CAROLINA MCCLELLAN MD   XR Pelvis w Hip Left G/E 2 Views    Narrative    XR PELVIS AND HIP LEFT 2 VIEWS 4/8/2019 7:57 PM     HISTORY: pain with walking, non verbal      Impression    IMPRESSION: Negative exam.    CAROLINA MCCLELLAN MD       Medications   ibuprofen (ADVIL/MOTRIN) tablet 600 mg (600 mg Oral Given 4/8/19 1843)       Assessments & Plan (with Medical Decision Making)  Gemini is a 33-year-old female, history of autistic disorder, mental retardation, epilepsy, presents to the emergency department today with her caregiver with concerns of possible left leg or foot injury.  Please refer to HPI and focused exam.  Exam does not reveal any traumatic findings, there is no ecchymosis, significant swelling, patient does not appear uncomfortable with palpation of left hip, left thigh, left knee or lower leg.  Patient has had no fever, chills, vomiting or diarrhea to suggest an infectious process.  She arrives here hemodynamically stable and afebrile.  Patient has been eating and drinking well.  Patient was given ibuprofen here and imaging was obtained of left lower extremity which is  unremarkable.  Patient's walking did improve after the ibuprofen administration.  With I did discuss with her caregiver that there may be some bruising or nerve pain, it sounds like there is some history of sciatica which may be attributing to patient's symptoms today as well.  At this time I feel patient is stable to be discharged back to her caregiver/group home, I did recommend scheduled ibuprofen which can be alternated with Tylenol as needed for the next 2-3 days.  Certainly if symptoms worsen or other concerns arise, patient should return here for further workup/evaluation.  Patient's caregiver was agreeable to plan of care and patient was discharged in stable condition.     I have reviewed the nursing notes.    I have reviewed the findings, diagnosis, plan and need for follow up with the patient.    Final diagnoses:   Pain of left lower leg       4/8/2019   [facility] EMERGENCY DEPARTMENT     Emma Hernández, BRANDON CNP  04/08/19 2117

## 2019-04-17 ENCOUNTER — ALLIED HEALTH/NURSE VISIT (OUTPATIENT)
Dept: FAMILY MEDICINE | Facility: OTHER | Age: 34
End: 2019-04-17
Payer: MEDICARE

## 2019-04-17 VITALS — DIASTOLIC BLOOD PRESSURE: 84 MMHG | SYSTOLIC BLOOD PRESSURE: 130 MMHG

## 2019-04-17 DIAGNOSIS — Z30.42 ENCOUNTER FOR SURVEILLANCE OF INJECTABLE CONTRACEPTIVE: Primary | ICD-10-CM

## 2019-04-17 PROCEDURE — 96372 THER/PROPH/DIAG INJ SC/IM: CPT

## 2019-04-17 RX ADMIN — MEDROXYPROGESTERONE ACETATE 150 MG: 150 INJECTION, SUSPENSION INTRAMUSCULAR at 15:59

## 2019-04-17 NOTE — PROGRESS NOTES
Prior to injection, verified patient identity using patient's name and date of birth.  Due to injection administration, patient instructed to remain in clinic for 15 minutes  afterwards, and to report any adverse reaction to me immediately.    BP: 130/84    LAST PAP/EXAM: No results found for: PAP  URINE HCG:not indicated- Per Yissel Chandler patient does not need to have this completed.     NEXT INJECTION DUE: 7/3/19 - 7/17/19         Drug Amount Wasted:  None.  Vial/Syringe: Single dose vial  Expiration Date:  02/2020  Given LD per patient request.     Berna Dawn MA

## 2019-06-17 DIAGNOSIS — F79 INTELLECTUAL DISABILITY: ICD-10-CM

## 2019-06-17 DIAGNOSIS — F84.0 ACTIVE AUTISTIC DISORDER: ICD-10-CM

## 2019-06-17 DIAGNOSIS — E56.9 VITAMIN DEFICIENCY: ICD-10-CM

## 2019-06-18 RX ORDER — QUETIAPINE FUMARATE 300 MG/1
TABLET, FILM COATED ORAL
Qty: 28 TABLET | Refills: 3 | Status: SHIPPED | OUTPATIENT
Start: 2019-06-18 | End: 2019-10-02 | Stop reason: DRUGHIGH

## 2019-06-18 RX ORDER — MULTIVITAMIN WITH FOLIC ACID 400 MCG
TABLET ORAL
Qty: 28 TABLET | Refills: 3 | Status: SHIPPED | OUTPATIENT
Start: 2019-06-18 | End: 2019-10-30

## 2019-06-18 RX ORDER — ASCORBIC ACID 500 MG
TABLET ORAL
Qty: 28 TABLET | Refills: 3 | Status: SHIPPED | OUTPATIENT
Start: 2019-06-18 | End: 2019-10-30

## 2019-06-18 RX ORDER — CITALOPRAM HYDROBROMIDE 40 MG/1
TABLET ORAL
Qty: 28 TABLET | Refills: 3 | Status: SHIPPED | OUTPATIENT
Start: 2019-06-18 | End: 2019-11-27

## 2019-06-18 RX ORDER — QUETIAPINE FUMARATE 100 MG/1
TABLET, FILM COATED ORAL
Qty: 28 TABLET | Refills: 3 | Status: SHIPPED | OUTPATIENT
Start: 2019-06-18 | End: 2019-10-02

## 2019-06-18 NOTE — TELEPHONE ENCOUNTER
Routing refill request to provider for review/approval because:  Labs not current:  FLP, CBC  Medication is reported/historical (Seroquel, Celexa)    MIMI GomezN, RN  [facility]

## 2019-06-18 NOTE — TELEPHONE ENCOUNTER
"Requested Prescriptions   Pending Prescriptions Disp Refills     Multiple Vitamin (TAB-A-DAVID) TABS [Pharmacy Med Name: MULTI-VIT (TAB-A-VIT) TABLET] 28 tablet 0     Sig: TAKE 1 TABLET BY MOUTH ONCE DAILY   Last Written Prescription Date:  11/21/18  Last Fill Quantity: 28,  # refills: 5   Last office visit: 4/17/2019 with prescribing provider:  3/6/19   Future Office Visit:   Next 5 appointments (look out 90 days)    Jul 12, 2019 10:00 AM CDT  Nurse Only with [facility] [address] [facility] [address]  [phone]           Vitamin Supplements (Adult) Protocol Passed - 6/17/2019 11:17 AM        Passed - High dose Vitamin D not ordered        Passed - Recent (12 mo) or future (30 days) visit within the authorizing provider's specialty     Patient had office visit in the last 12 months or has a visit in the next 30 days with authorizing provider or within the authorizing provider's specialty.  See \"Patient Info\" tab in inbasket, or \"Choose Columns\" in Meds & Orders section of the refill encounter.              Passed - Medication is active on med list        citalopram (CELEXA) 40 MG tablet [Pharmacy Med Name: CITALOPRAM 40 MG TABLET] 28 tablet 0     Sig: TAKE 1 TABLET BY MOUTH ONCE DAILY   Last Written Prescription Date:  NA  Last Fill Quantity: NA,  # refills: NA   Last office visit: 4/17/2019 with prescribing provider:  3/6/19   Future Office Visit:   Next 5 appointments (look out 90 days)    Jul 12, 2019 10:00 AM CDT  Nurse Only with [facility] [address] [facility] [address]  [phone]           SSRIs Protocol Passed - 6/17/2019 11:17 AM        Passed - Recent (12 mo) or future (30 days) visit within the authorizing provider's specialty     Patient had office visit in the last 12 months or has a visit in the next 30 days with authorizing provider or within the authorizing provider's " "specialty.  See \"Patient Info\" tab in inbasket, or \"Choose Columns\" in Meds & Orders section of the refill encounter.              Passed - Medication is active on med list        Passed - Patient is age 18 or older        Passed - No active pregnancy on record        Passed - No positive pregnancy test in last 12 months        QUEtiapine (SEROQUEL) 100 MG tablet [Pharmacy Med Name: QUETIAPINE 100 MG TAB] 28 tablet 0     Sig: TAKE 1 TABLET BY MOUTH ONCE DAILY   Last Written Prescription Date:  NA  Last Fill Quantity: NA,  # refills: NA   Last office visit: 4/17/2019 with prescribing provider:  3/6/19   Future Office Visit:   Next 5 appointments (look out 90 days)    Jul 12, 2019 10:00 AM CDT  Nurse Only with TUTU ALLEN [facility] [address] [facility] [address]  [phone]           Antipsychotic Medications Failed - 6/17/2019 11:17 AM        Failed - Lipid panel on file within the past 12 months     Recent Labs   Lab Test 07/28/17 04/02/12  0823   CHOL 170   < > 186   TRIG 79   < > 63   HDL 41   < > 41*      < > 133*   VLDL  --   --  13   CHOLHDLRATIO  --   --  5.0    < > = values in this interval not displayed.               Failed - CBC on file in past 12 months     Recent Labs   Lab Test 03/07/17  1458   WBC 7.8   RBC 4.42   HGB 14.3   HCT 40.9                    Passed - Blood pressure under 140/90 in past 12 months     BP Readings from Last 3 Encounters:   04/17/19 130/84   04/08/19 132/88   03/06/19 130/74                 Passed - Patient is 12 years of age or older        Passed - Heart Rate on file within past 12 months     Pulse Readings from Last 3 Encounters:   04/08/19 90   03/06/19 95   10/26/18 80               Passed - A1c or Glucose on file in past 12 months     Recent Labs   Lab Test 03/06/19  1318   GLC 83       Please review patients last 3 weights. If a weight gain of >10 lbs exists, you may refill the prescription once after " "instructing the patient to schedule an appointment within the next 30 days.    Wt Readings from Last 3 Encounters:   04/08/19 78.6 kg (173 lb 4.8 oz)   03/06/19 76.1 kg (167 lb 12.8 oz)   01/15/19 79.4 kg (175 lb 2 oz)             Passed - Medication is active on med list        Passed - Patient is not pregnant        Passed - No positve pregnancy test on file in past 12 months        Passed - Recent (6 mo) or future (30 days) visit within the authorizing provider's specialty     Patient had office visit in the last 6 months or has a visit in the next 30 days with authorizing provider or within the authorizing provider's specialty.  See \"Patient Info\" tab in inbasket, or \"Choose Columns\" in Meds & Orders section of the refill encounter.            vitamin C (ASCORBIC ACID) 500 MG tablet [Pharmacy Med Name: VITAMIN C 500 MG TAB] 28 tablet 0     Sig: TAKE 1 TABLET BY MOUTH ONCE DAILY   Last Written Prescription Date:  3/19/19  Last Fill Quantity: 28,  # refills: 0   Last office visit: 4/17/2019 with prescribing provider:  3/6/19   Future Office Visit:   Next 5 appointments (look out 90 days)    Jul 12, 2019 10:00 AM CDT  Nurse Only with TUTU ALLEN MA  [facility]) [address] [facility] [address]  [phone]           There is no refill protocol information for this order        QUEtiapine (SEROQUEL) 300 MG tablet [Pharmacy Med Name: QUETIAPINE 300 MG TAB] 28 tablet 0     Sig: TAKE 1 TABLET BY MOUTH AT BEDTIME   Last Written Prescription Date:  NA  Last Fill Quantity: NA,  # refills: NA   Last office visit: 4/17/2019 with prescribing provider:  3/6/19   Future Office Visit:   Next 5 appointments (look out 90 days)    Jul 12, 2019 10:00 AM CDT  Nurse Only with TUTU ALLEN [facility]) [address] [facility] [address]  [phone]           Antipsychotic Medications Failed - 6/17/2019 11:17 AM        Failed - Lipid panel on file " "within the past 12 months     Recent Labs   Lab Test 07/28/17 04/02/12  0823   CHOL 170   < > 186   TRIG 79   < > 63   HDL 41   < > 41*      < > 133*   VLDL  --   --  13   CHOLHDLRATIO  --   --  5.0    < > = values in this interval not displayed.               Failed - CBC on file in past 12 months     Recent Labs   Lab Test 03/07/17  1458   WBC 7.8   RBC 4.42   HGB 14.3   HCT 40.9                    Passed - Blood pressure under 140/90 in past 12 months     BP Readings from Last 3 Encounters:   04/17/19 130/84   04/08/19 132/88   03/06/19 130/74                 Passed - Patient is 12 years of age or older        Passed - Heart Rate on file within past 12 months     Pulse Readings from Last 3 Encounters:   04/08/19 90   03/06/19 95   10/26/18 80               Passed - A1c or Glucose on file in past 12 months     Recent Labs   Lab Test 03/06/19  1318   GLC 83       Please review patients last 3 weights. If a weight gain of >10 lbs exists, you may refill the prescription once after instructing the patient to schedule an appointment within the next 30 days.    Wt Readings from Last 3 Encounters:   04/08/19 78.6 kg (173 lb 4.8 oz)   03/06/19 76.1 kg (167 lb 12.8 oz)   01/15/19 79.4 kg (175 lb 2 oz)             Passed - Medication is active on med list        Passed - Patient is not pregnant        Passed - No positve pregnancy test on file in past 12 months        Passed - Recent (6 mo) or future (30 days) visit within the authorizing provider's specialty     Patient had office visit in the last 6 months or has a visit in the next 30 days with authorizing provider or within the authorizing provider's specialty.  See \"Patient Info\" tab in inbasket, or \"Choose Columns\" in Meds & Orders section of the refill encounter.            "

## 2019-06-20 ENCOUNTER — HOSPITAL ENCOUNTER (EMERGENCY)
Facility: CLINIC | Age: 34
Discharge: HOME | End: 2019-06-20
Attending: FAMILY MEDICINE | Admitting: FAMILY MEDICINE
Payer: MEDICARE

## 2019-06-20 ENCOUNTER — APPOINTMENT (OUTPATIENT)
Dept: GENERAL RADIOLOGY | Facility: CLINIC | Age: 34
End: 2019-06-20
Attending: FAMILY MEDICINE
Payer: MEDICARE

## 2019-06-20 VITALS
DIASTOLIC BLOOD PRESSURE: 51 MMHG | OXYGEN SATURATION: 98 % | HEART RATE: 103 BPM | RESPIRATION RATE: 18 BRPM | SYSTOLIC BLOOD PRESSURE: 102 MMHG

## 2019-06-20 DIAGNOSIS — M79.89 SWELLING OF LEFT FOOT: ICD-10-CM

## 2019-06-20 PROCEDURE — 73630 X-RAY EXAM OF FOOT: CPT | Mod: TC,LT

## 2019-06-20 PROCEDURE — 99284 EMERGENCY DEPT VISIT MOD MDM: CPT | Mod: Z6 | Performed by: FAMILY MEDICINE

## 2019-06-20 PROCEDURE — 99283 EMERGENCY DEPT VISIT LOW MDM: CPT | Performed by: FAMILY MEDICINE

## 2019-06-20 NOTE — ED AVS SNAPSHOT
[facility] Emergency Department  [address] [place] [address]  Phone:  [phone]  Fax:  [phone]                                    Gemini Mccabe   MRN: [id number]    Department:  [facility] Emergency Department   Date of Visit:  6/20/2019           After Visit Summary Signature Page    I have received my discharge instructions, and my questions have been answered. I have discussed any challenges I see with this plan with the nurse or doctor.    ..........................................................................................................................................  Patient/Patient Representative Signature      ..........................................................................................................................................  Patient Representative Print Name and Relationship to Patient    ..................................................               ................................................  Date                                   Time    ..........................................................................................................................................  Reviewed by Signature/Title    ...................................................              ..............................................  Date                                               Time          22EPIC Rev 08/18

## 2019-06-21 ENCOUNTER — PATIENT OUTREACH (OUTPATIENT)
Dept: CARE COORDINATION | Facility: CLINIC | Age: 34
End: 2019-06-21

## 2019-06-21 ASSESSMENT — ACTIVITIES OF DAILY LIVING (ADL)
DEPENDENT_IADLS:: CLEANING;COOKING;LAUNDRY;SHOPPING;MEAL PREPARATION;MEDICATION MANAGEMENT;MONEY MANAGEMENT;TRANSPORTATION

## 2019-06-21 NOTE — ED TRIAGE NOTES
Patient here with guardian who states she noticed L foot swollen and patient appears to be limping. Patient non-verbal.

## 2019-06-21 NOTE — DISCHARGE INSTRUCTIONS
There is some swelling of the left foot, without any evidence for fracture or foreign body.    Orders for the Group Home:  Elevate the foot as much as possible.  2. Ice the foot every 4-6 hours for 10 minutes at a time for the next 24 hours.  3. Try to limit the amount of walking for the next 24 hours.  4. Administer ibuprofen up to 600 mg 3 times a day with food for 5 days.  5. Add Tylenol 1000 mg every 6 hours as needed for breakthrough pain for 5 days.  6. Follow-up with your primary care provider in 4-5 days if not improving.  7. Return to the emergency department if you develop any fever, redness, or worsening pain or swelling.      June 20, 2019   10:02 PM

## 2019-06-21 NOTE — LETTER
Health Care Home - Access  Care Plan    About Me:    Patient Name:  Gemini Mccabe    YOB: 1985  Age:                      33 year old   [place] MRN:     [id number] Telephone Information:   Home Phone [phone]   Mobile [phone]       Address:  [address]   [place] [address] Email address:  No e-mail address on record      Emergency Contact(s)   Name Relationship Lgl Grd Work Phone Home Phone Mobile Phone   1. TONY BANKS* Friend Yes  [phone]   2. NO SECONDARY C*    none                My Access Plan  Medical Emergency 911   Questions or concerns during clinic hours Primary Clinic Line, I will call the clinic directly: [facility] - [phone]   24 Hour Appointment Line [phone] or  [phone]) (toll free)   24 Hour Nurse Line [phone] (toll free)   Questions or concerns outside clinic hours 24 Hour Appointment Line, I will call the after-hours on-call line:   [facility] [phone] or [phone] ([phone]) (toll-free)   Preferred Urgent Care [facility] [phone]   Preferred Hospital [facility]  [phone]   Preferred Pharmacy OTHER (NONSPECIFIC)     Behavioral Health Crisis Line The National Suicide Prevention Lifeline at 1-907.409.6896 or 911                     My Care Team Members  Patient Care Team       Relationship Specialty Notifications Start End    Yissel Chandler APRN CNP PCP - General Nurse Practitioner - Family  10/5/18     Phone: [phone] Fax: [phone]         [address] [facility] [address]    Yissel Chandler APRN CNP Assigned PCP   4/26/19     Phone: [phone] Fax: [phone]         [address] [facility] [address]           My Medical and Care Information  Problem List   Patient Active Problem List   Diagnosis     Mental retardation     Epilepsy (H)     Absence of menstruation     Constipation     Active autistic disorder      Chronic UTI     CARDIOVASCULAR SCREENING; LDL GOAL LESS THAN 160     Pneumonia     UTI (urinary tract infection)     Horseshoe kidney     Frequent UTI      Current Medications and Allergies:  See printed Medication Report

## 2019-06-21 NOTE — PROGRESS NOTES
Clinic Care Coordination Contact  [facility]/Voicemail       Clinical Data: Care Coordinator Outreach  Patient presented to ED on 6/20/19 with complaint of left foot swelling. Patient resides in group home and has a legal guardian who brought patient to ED to have the foot checked out. ED provider found no acute fracture, dislocation or foreign bodies.  ED provider recommended that icing foot frequently, elevate the foot, limit walking for the next 24 hours, and administer ibuprofen and Tylenol as needed for pain.  Follow-up with primary care provider in 5 days if swelling persists.  Return to ED at any time if symptoms worsen.    Outreach attempted x 1 to patient's legal guardian.  Left message on voicemail with call back information and requested return call.    Plan: . Care Coordinator will try to reach guardian again in 1-2 business days.    KATIA Walters, RN   [facility] - RN Care Coordinator  Phone: [phone]

## 2019-06-21 NOTE — ED PROVIDER NOTES
"                                                            ED Provider Note     Gemini Mccabe  [id number]  June 20, 2019      CC:     Chief Complaint   Patient presents with     Foot Pain          History is obtained from the patient's guardian.  Patient lives in a group home, is nonverbal, and has intellectual disabilities.    HPI: Gemini Mccabe is a 33 year old female presenting with swelling of the left foot, noted by group home staff this morning.  The guardian did not arrive until this evening, and brought her to the emergency department for evaluation.  Patient has not been wanting to put her shoes on due to the swelling and possible pain.  She has been pacing, and weightbearing.  Her feet are typically swollen, but it appears that the left foot is more swollen than the right.  There are some plantar warts.  There is no known injury or trauma.  Pain level is difficult to gauge.  The patient's guardian states that she typically has \"pudgy\" feet.      PMH/Problem List:   Past Medical History:   Diagnosis Date     Absence of menstruation      Autistic disorder, current or active state      Chronic UTI      Other specified congenital anomaly of kidney      Unspecified constipation      Unspecified epilepsy without mention of intractable epilepsy      Unspecified intellectual disabilities        PSH:   Past Surgical History:   Procedure Laterality Date     C REIMPLANT URETER,SINGLE URETER  1985       MEDS:   No current facility-administered medications on file prior to encounter.   Current Outpatient Medications on File Prior to Encounter:  BUTT PASTE - REGULAR (DR LOVE POOP GOOP BUTT PASTE FORMULA) Apply  topically See Admin Instructions. With each change of adult diaper. (Patient not taking: Reported on 3/6/2019)   CHILDRENS IBUPROFEN 100 MG/5ML suspension TAKE 30 MLS BY MOUTH EVERY 6 HOURS AS NEEDED FOR FEVER. (Patient not taking: Reported on 3/6/2019)   citalopram (CELEXA) 40 MG tablet TAKE 1 TABLET BY MOUTH " ONCE DAILY   CRANBERRY FRUIT PO Take  by mouth. 500 mg, 1 capsule daily   Disposable Gloves (NITRILE GLOVES MEDIUM) MISC USE EVERY DAY AS NEEDED  MUST MAKE APPT. BEFORE ANY ADDITIONAL REFILLS**   docusate (COLACE) 50 MG/5ML liquid Take 100 mg by mouth daily.     LORazepam (ATIVAN) 0.5 MG tablet Take 1 tablet (0.5 mg) by mouth 2 times daily as needed for agitation or anxiety Not to exceed 2 tabs in 24 hours, try Risperdal first   medroxyPROGESTERone (DEPO-PROVERA) 150 MG/ML injection Inject 1 mL (150 mg) into the muscle every 3 months   Multiple Vitamin (TAB-A-DAVID) TABS TAKE 1 TABLET BY MOUTH ONCE DAILY   Nebulizer nebulization 4 times daily. Use 4 times a day as instructed (Patient not taking: Reported on 3/6/2019)   polyethylene glycol (MIRALAX) powder Take 1 tsp mixed with liquid by mouth once daily   QUEtiapine (SEROQUEL) 100 MG tablet TAKE 1 TABLET BY MOUTH ONCE DAILY   QUEtiapine (SEROQUEL) 300 MG tablet TAKE 1 TABLET BY MOUTH AT BEDTIME   QUEtiapine Fumarate (SEROQUEL PO) Take by mouth daily 100 mg in the morning and 300 mg at bedtime   risperiDONE (RISPERDAL) 0.5 MG tablet Take 1 tablet (0.5 mg) by mouth 2 times daily as needed   vitamin C (ASCORBIC ACID) 500 MG tablet TAKE 1 TABLET BY MOUTH ONCE DAILY       Allergies: Prevnar and Sulfa drugs    Triage and nursing notes were reviewed.    ROS: All other systems were reviewed and are negative    Physical Exam:  Vitals:    06/20/19 2109   BP: 102/51   Pulse: 103   Resp: 18   SpO2: 98%     GENERAL APPEARANCE: Alert, weightbearing and shifting her weight from side to side.  HEAD: atraumatic  HENT: Normal external exam  RESP: Normal respiratory effort  EXT: Left foot with soft tissue swelling, no significant redness, or open wounds.  There is no bruising; right foot with some less soft tissue swelling.  SKIN: no rash; as above  NEURO: Patient is ambulating, and has spontaneous movement of all extremities; unable to test other neuro function.    Labs/Imaging  Results:  Results for orders placed or performed during the hospital encounter of 06/20/19 (from the past 24 hour(s))   Foot XR, G/E 3 views, left    Narrative    XR LEFT FOOT THREE OR MORE VIEWS   6/20/2019 9:37 PM     HISTORY: Swelling, limping. Nonverbal patient. No known trauma.    COMPARISON: None.       Impression    IMPRESSION: No acute fracture or dislocation.    GIORGI ROMAN MD             Impression:  Final diagnoses:   Swelling of left foot         ED Course & Medical Decision Making (Plan):  Gemini Mccabe is a 33 year old female with left foot swelling, and slight limp.  She is walking quite a bit and was pacing.  She was anxious, and her guardian wanted to have the foot checked out.  Group home staff noted some swelling this morning.  She has not wanted to wear shoes.  The guardian gave her some slippers to use.  Patient ambulated to the room and to radiology.  X-rays of the left foot were obtained and personally reviewed by me.  There is no acute fracture, dislocation or foreign bodies.  The foot was examined, and reveal slight redness to the tip of the third toe.  There is no open wounds or sores.  She has a few plantar warts.  I recommended that we have her ice frequently, elevate the foot, limit walking for the next 24 hours, and administer ibuprofen and Tylenol as needed for pain.  Follow-up with her primary care provider in 5 days if swelling persists.  Return at any time if symptoms worsen.    Written after-visit summary and instructions were given at the time of discharge.          Follow Up Plan:  Yissel Chandler, BRANDON CNP  [address] [facility] [address]  [phone]    In 5 days  if not improving        Discharge Meds: None          Disclaimer: This note consists of words and symbols derived from keyboarding and dictation using voice recognition software.  As a result, there may be errors that have gone undetected.  Please consider this when interpreting information found in this  note.       Timur Anaya MD  06/20/19 4571

## 2019-06-21 NOTE — LETTER
[place] CARE COORDINATION    June 24, 2019    Gemini Mccabe  [address] [place] [address]      Dear Gemini,    I am a clinic care coordinator who works with BRANDON Reyes CNP at [facility].  I wanted to introduce myself and provide you with my contact information so that you can call me with questions or concerns about your health care. Below is a description of clinic care coordination and how I can further assist you.     The clinic care coordinator is a registered nurse and/or  who understand the health care system. The goal of clinic care coordination is to help you manage your health and improve access to the [facility] system in the most efficient manner. The registered nurse can assist you in meeting your health care goals by providing education, coordinating services, and strengthening the communication among your providers. The  can assist you with financial, behavioral, psychosocial, chemical dependency, counseling, and/or psychiatric resources.    Please feel free to contact me at [phone], with any questions or concerns. We at [facility] are focused on providing you with the highest-quality healthcare experience possible and that all starts with you.     Sincerely,     Ayesha Bee, MIMIN, RN   [facility] - RN Care Coordinator    Enclosed: I have enclosed a copy of a 24 Hour Access Plan. This has helpful phone numbers for you to call when needed. Please keep this in an easy to access place to use as needed.

## 2019-06-24 NOTE — PROGRESS NOTES
Clinic Care Coordination Contact  [facility]/Voicemail    Referral Source: ED Follow-Up  See initial note below    Clinical Data: Care Coordinator Outreach    Outreach attempted x 2.  Left message on voicemail with call back information and requested return call.    Plan: Care Coordinator will mail out care coordination introduction letter with care coordinator contact information and explanation of care coordination services. Care Coordinator will do no further outreaches at this time.    KATIA Walters, RN   [facility] - RN Care Coordinator  Phone: [phone]

## 2019-07-12 ENCOUNTER — ALLIED HEALTH/NURSE VISIT (OUTPATIENT)
Dept: FAMILY MEDICINE | Facility: OTHER | Age: 34
End: 2019-07-12
Payer: MEDICARE

## 2019-07-12 VITALS — DIASTOLIC BLOOD PRESSURE: 70 MMHG | SYSTOLIC BLOOD PRESSURE: 110 MMHG

## 2019-07-12 PROCEDURE — 96372 THER/PROPH/DIAG INJ SC/IM: CPT

## 2019-07-12 RX ORDER — MEDROXYPROGESTERONE ACETATE 150 MG/ML
150 INJECTION, SUSPENSION INTRAMUSCULAR ONCE
Status: COMPLETED | OUTPATIENT
Start: 2019-07-12 | End: 2019-07-12

## 2019-07-12 RX ADMIN — MEDROXYPROGESTERONE ACETATE 150 MG: 150 INJECTION, SUSPENSION INTRAMUSCULAR at 10:27

## 2019-07-12 NOTE — PROGRESS NOTES
Clinic Administered Medication Documentation      Depo Provera Documentation    Prior to injection, verified patient identity using patient's name and date of birth. Medication was administered. Please see MAR and medication order for additional information. Patient instructed to remain in clinic for 15 minutes and report any adverse reaction to staff immediately .    BP: 110/70    LAST PAP/EXAM: No results found for: PAP  URINE HCG:not indicated    NEXT INJECTION DUE: 9/27/19 - 10/11/19    Was entire vial of medication used? Yes  Vial/Syringe: Single dose vial  Expiration Date:  06/2020  Given in RD per patient request.       Informed patient caregiver that this is the last CAM order we have patient will need to be sign for more orders. They have set up appointment next week for Physical.     Matthew Cruz PA-C approved to have a one time depo today for patient until she can see charles.       Berna Dawn MA

## 2019-08-16 ENCOUNTER — OFFICE VISIT (OUTPATIENT)
Dept: FAMILY MEDICINE | Facility: OTHER | Age: 34
End: 2019-08-16
Payer: MEDICARE

## 2019-08-16 VITALS
HEART RATE: 104 BPM | OXYGEN SATURATION: 97 % | RESPIRATION RATE: 16 BRPM | HEIGHT: 62 IN | DIASTOLIC BLOOD PRESSURE: 74 MMHG | BODY MASS INDEX: 32.02 KG/M2 | WEIGHT: 174 LBS | SYSTOLIC BLOOD PRESSURE: 104 MMHG | TEMPERATURE: 97.2 F

## 2019-08-16 DIAGNOSIS — N91.2 ABSENCE OF MENSTRUATION: ICD-10-CM

## 2019-08-16 DIAGNOSIS — F84.0 ACTIVE AUTISTIC DISORDER: ICD-10-CM

## 2019-08-16 DIAGNOSIS — Z00.00 ENCOUNTER FOR MEDICARE ANNUAL WELLNESS EXAM: Primary | ICD-10-CM

## 2019-08-16 DIAGNOSIS — J30.9 ALLERGIC RHINITIS, UNSPECIFIED SEASONALITY, UNSPECIFIED TRIGGER: ICD-10-CM

## 2019-08-16 DIAGNOSIS — F79 INTELLECTUAL DISABILITY: ICD-10-CM

## 2019-08-16 DIAGNOSIS — Z23 NEED FOR VACCINATION: ICD-10-CM

## 2019-08-16 DIAGNOSIS — R06.83 SNORING: ICD-10-CM

## 2019-08-16 DIAGNOSIS — N39.0 CHRONIC UTI: ICD-10-CM

## 2019-08-16 PROCEDURE — G0439 PPPS, SUBSEQ VISIT: HCPCS | Performed by: NURSE PRACTITIONER

## 2019-08-16 PROCEDURE — 90714 TD VACC NO PRESV 7 YRS+ IM: CPT | Performed by: NURSE PRACTITIONER

## 2019-08-16 PROCEDURE — 90471 IMMUNIZATION ADMIN: CPT | Performed by: NURSE PRACTITIONER

## 2019-08-16 PROCEDURE — 99213 OFFICE O/P EST LOW 20 MIN: CPT | Mod: 25 | Performed by: NURSE PRACTITIONER

## 2019-08-16 RX ORDER — MEDROXYPROGESTERONE ACETATE 150 MG/ML
150 INJECTION, SUSPENSION INTRAMUSCULAR
Status: COMPLETED | OUTPATIENT
Start: 2019-08-16 | End: 2020-07-06

## 2019-08-16 RX ORDER — LORATADINE 10 MG/1
10 TABLET ORAL DAILY
Qty: 90 TABLET | Refills: 3 | Status: SHIPPED | OUTPATIENT
Start: 2019-08-16 | End: 2020-09-10

## 2019-08-16 RX ORDER — RISPERIDONE 0.5 MG/1
0.5 TABLET ORAL DAILY
COMMUNITY
Start: 2019-08-16

## 2019-08-16 RX ORDER — MEDROXYPROGESTERONE ACETATE 150 MG/ML
150 INJECTION, SUSPENSION INTRAMUSCULAR
Qty: 1 ML | Refills: 3 | OUTPATIENT
Start: 2019-08-16 | End: 2019-08-16

## 2019-08-16 RX ORDER — RISPERIDONE 0.25 MG/1
0.25 TABLET ORAL DAILY
COMMUNITY
End: 2021-04-22 | Stop reason: DRUGHIGH

## 2019-08-16 ASSESSMENT — MIFFLIN-ST. JEOR: SCORE: 1447.91

## 2019-08-16 ASSESSMENT — ENCOUNTER SYMPTOMS
WHEEZING: 1
ABDOMINAL PAIN: 0
MYALGIAS: 0
HEADACHES: 0
BREAST MASS: 0
CHILLS: 0
FEVER: 0
WEAKNESS: 0
DIZZINESS: 0
NERVOUS/ANXIOUS: 0
COUGH: 1
JOINT SWELLING: 1
PALPITATIONS: 0
ARTHRALGIAS: 1
HEMATOCHEZIA: 0
FREQUENCY: 0
DIARRHEA: 0
HEARTBURN: 0
HEMATURIA: 0
DYSURIA: 1
PARESTHESIAS: 0
NAUSEA: 0
SHORTNESS OF BREATH: 0
SORE THROAT: 0
CONSTIPATION: 0
EYE PAIN: 0

## 2019-08-16 ASSESSMENT — ACTIVITIES OF DAILY LIVING (ADL)
CURRENT_FUNCTION: HOUSEWORK REQUIRES ASSISTANCE
CURRENT_FUNCTION: MEDICATION ADMINISTRATION REQUIRES ASSISTANCE
CURRENT_FUNCTION: BATHING REQUIRES ASSISTANCE
CURRENT_FUNCTION: TRANSPORTATION REQUIRES ASSISTANCE
CURRENT_FUNCTION: PREPARING MEALS REQUIRES ASSISTANCE
CURRENT_FUNCTION: LAUNDRY REQUIRES ASSISTANCE
CURRENT_FUNCTION: SHOPPING REQUIRES ASSISTANCE
CURRENT_FUNCTION: TELEPHONE REQUIRES ASSISTANCE
CURRENT_FUNCTION: MONEY MANAGEMENT REQUIRES ASSISTANCE

## 2019-08-16 NOTE — PROGRESS NOTES
"SUBJECTIVE:   Gemini Mccabe is a 33 year old female who presents for Preventive Visit.    Are you in the first 12 months of your Medicare coverage?  No    Healthy Habits:     In general, how would you rate your overall health?  Fair    Frequency of exercise:  4-5 days/week    Duration of exercise:  15-30 minutes    Do you usually eat at least 4 servings of fruit and vegetables a day, include whole grains    & fiber and avoid regularly eating high fat or \"junk\" foods?  No    Taking medications regularly:  Yes    Medication side effects:  Other    Ability to successfully perform activities of daily living:  Telephone requires assistance, transportation requires assistance, shopping requires assistance, preparing meals requires assistance, housework requires assistance, bathing requires assistance, laundry requires assistance, medication administration requires assistance and money management requires assistance    Home Safety:  No safety concerns identified    Hearing Impairment:  No hearing concerns    In the past 6 months, have you been bothered by leaking of urine?  No    In general, how would you rate your overall mental or emotional health?  Good      PHQ-2 Total Score: 0    Additional concerns today:  Yes  Allergies   Associated symptoms include arthralgias, congestion, coughing and joint swelling. Pertinent negatives include no abdominal pain, chest pain, chills, fever, headaches, myalgias, nausea, rash, sore throat or weakness.     Do you feel safe in your environment? YES    Do you have a Health Care Directive? No: Advance care planning was reviewed with patient; patient declined at this time.    She is here with her guardian.  Is now living in her home instead of a group home.  Guardian has a few concerns:     Decreased urination.  She is working on getting toilet trained again, so this may be part of it, but she has a history of frequent UTIs.    Snoring - Guardian has noticed she will wake up choking at " times.  Has never had a sleep study  Chronic post-nasal drip and runny nose. Wondering what allergy medications she can try.       Fall risk  Fallen 2 or more times in the past year?: No  Any fall with injury in the past year?: No    Cognitive Screening Not appropriate due to mental handicap    Do you have sleep apnea, excessive snoring or daytime drowsiness?: possibly    Reviewed and updated as needed this visit by clinical staff  Tobacco  Allergies  Meds  Soc Hx        Reviewed and updated as needed this visit by Provider        Social History     Tobacco Use     Smoking status: Passive Smoke Exposure - Never Smoker     Smokeless tobacco: Never Used   Substance Use Topics     Alcohol use: No     If you drink alcohol do you typically have >3 drinks per day or >7 drinks per week? Not applicable    Alcohol Use 8/16/2019   Prescreen: >3 drinks/day or >7 drinks/week? Not Applicable   No flowsheet data found.      Current providers sharing in care for this patient include:   Patient Care Team:  Yissel Chandler APRN CNP as PCP - General (Nurse Practitioner - Family)  Yissel Chandler APRN CNP as Assigned PCP    The following health maintenance items are reviewed in Epic and correct as of today:  Health Maintenance   Topic Date Due     ADVANCE CARE PLANNING  1984     HIV SCREENING  12/11/1999     MEDICARE ANNUAL WELLNESS VISIT  03/28/2012     PHQ-2  01/01/2019     DTAP/TDAP/TD IMMUNIZATION (7 - Td) 01/16/2019     INFLUENZA VACCINE (1) 09/01/2019     IPV IMMUNIZATION  Completed     MENINGITIS IMMUNIZATION  Aged Out       Review of Systems   Constitutional: Negative for chills and fever.   HENT: Positive for congestion. Negative for ear pain, hearing loss and sore throat.    Eyes: Negative for pain and visual disturbance.   Respiratory: Positive for cough and wheezing. Negative for shortness of breath.    Cardiovascular: Positive for peripheral edema. Negative for chest pain and palpitations.   Gastrointestinal:  "Negative for abdominal pain, constipation, diarrhea, heartburn, hematochezia and nausea.   Breasts:  Negative for tenderness, breast mass and discharge.   Genitourinary: Positive for dysuria. Negative for frequency, genital sores, hematuria, pelvic pain, urgency, vaginal bleeding and vaginal discharge.   Musculoskeletal: Positive for arthralgias and joint swelling. Negative for myalgias.   Skin: Negative for rash.   Neurological: Negative for dizziness, weakness, headaches and paresthesias.   Psychiatric/Behavioral: Negative for mood changes. The patient is not nervous/anxious.        OBJECTIVE:   /74   Pulse 104   Temp 97.2  F (36.2  C) (Temporal)   Resp 16   Ht 1.583 m (5' 2.34\")   Wt 78.9 kg (174 lb)   SpO2 97%   BMI 31.48 kg/m   Estimated body mass index is 31.48 kg/m  as calculated from the following:    Height as of this encounter: 1.583 m (5' 2.34\").    Weight as of this encounter: 78.9 kg (174 lb).  Physical Exam  GENERAL: alert, no distress and obese  EYES: Eyes grossly normal to inspection, PERRL and conjunctivae and sclerae normal  HENT: ear canals and TM's normal, nose and mouth without ulcers or lesions  NECK: no adenopathy, no asymmetry, masses, or scars and thyroid normal to palpation  RESP: lungs clear to auscultation - no rales, rhonchi or wheezes  CV: regular rate and rhythm, normal S1 S2, no S3 or S4, no murmur, click or rub, no peripheral edema and peripheral pulses strong  ABDOMEN: soft, nontender, no hepatosplenomegaly, no masses and bowel sounds normal  MS: no gross musculoskeletal defects noted, no edema  SKIN: no suspicious lesions or rashes  NEURO: Normal strength and tone, sensory exam grossly normal and developmentally delayed, non-verbal  PSYCH: mentation appears normal, affect normal/bright    Diagnostic Test Results:  Pending     ASSESSMENT / PLAN:   1. Encounter for Medicare annual wellness exam    2. Absence of menstruation  Chronic, controlled.  No change in treatment " "plan.   On Depo for cycle control.   - medroxyPROGESTERone (DEPO-PROVERA) injection 150 mg  - medroxyPROGESTERone (DEPO-PROVERA) 150 MG/ML IM injection; Inject 1 mL (150 mg) into the muscle every 3 months  Dispense: 1 mL; Refill: 3    3. Need for vaccination  - 1st  Administration  [35919]  - TD PRSERV FREE >=7 YRS ADS IM [05423]    4. Allergic rhinitis, unspecified seasonality, unspecified trigger  Will try Claritin daily.  Follow up if symptoms fail to resolve as expected.   - loratadine (CLARITIN) 10 MG tablet; Take 1 tablet (10 mg) by mouth daily  Dispense: 90 tablet; Refill: 3    5. Chronic UTI  Will recheck UA.  Some of this may be behavorial as she is severely developmentally delayed and working on toilet training.    - UA reflex to Microscopic and Culture; Future    6. Snoring  Will get her set up for a sleep study.   - SLEEP EVALUATION & MANAGEMENT REFERRAL - [facility] [place] [facility] [phone] (Age 13 and up if over 100 lbs); Future    End of Life Planning:  Patient currently has an advanced directive: No, has a guardian in place    COUNSELING:  Reviewed preventive health counseling, as reflected in patient instructions    Estimated body mass index is 31.48 kg/m  as calculated from the following:    Height as of this encounter: 1.583 m (5' 2.34\").    Weight as of this encounter: 78.9 kg (174 lb).    Weight management plan: Discussed healthy diet and exercise guidelines     reports that she is a non-smoker but has been exposed to tobacco smoke. She has never used smokeless tobacco.      Appropriate preventive services were discussed with this patient, including applicable screening as appropriate for cardiovascular disease, diabetes, osteopenia/osteoporosis, and glaucoma.  As appropriate for age/gender, discussed screening for colorectal cancer, prostate cancer, breast cancer, and cervical cancer. Checklist reviewing preventive services available has been given to the patient.    Reviewed " patients plan of care and provided an AVS. The Basic Care Plan (routine screening as documented in Health Maintenance) for Gemini meets the Care Plan requirement. This Care Plan has been established and reviewed with the caregiver.    Counseling Resources:  ATP IV Guidelines  Pooled Cohorts Equation Calculator  Breast Cancer Risk Calculator  FRAX Risk Assessment  ICSI Preventive Guidelines  Dietary Guidelines for Americans, 2010  USDA's MyPlate  ASA Prophylaxis  Lung CA Screening    In addition to the preventive visit, 25  minutes of the appointment were spent evaluating and developing a treatment plan for her additional concern(s).        BRANDON Reyes [facility]    Identified Health Risks:

## 2019-08-16 NOTE — PATIENT INSTRUCTIONS
Bring back a urine sample.     Start Claritin once a day.     Set up a sleep study.       Patient Education   Personalized Prevention Plan  You are due for the preventive services outlined below.  Your care team is available to assist you in scheduling these services.  If you have already completed any of these items, please share that information with your care team to update in your medical record.  Health Maintenance Due   Topic Date Due     Discuss Advance Care Planning  1984     HIV Screening  12/11/1999     Annual Wellness Visit  03/28/2012     PHQ-2  01/01/2019     Diptheria Tetanus Pertussis (DTAP/TDAP/TD) Vaccine (7 - Td) 01/16/2019     Your Health Risk Assessment indicates you feel you are not in good health    A healthy lifestyle helps keep the body fit and the mind alert. It helps protect you from disease, helps you fight disease, and helps prevent chronic disease (disease that doesn't go away) from getting worse. This is important as you get older and begin to notice twinges in muscles and joints and a decline in the strength and stamina you once took for granted. A healthy lifestyle includes good healthcare, good nutrition, weight control, recreation, and regular exercise. Avoid harmful substances and do what you can to keep safe. Another part of a healthy lifestyle is stay mentally active and socially involved.    Good healthcare     Have a wellness visit every year.     If you have new symptoms, let us know right away. Don't wait until the next checkup.     Take medicines exactly as prescribed and keep your medicines in a safe place. Tell us if your medicine causes problems.   Healthy diet and weight control     Eat 3 or 4 small, nutritious, low-fat, high-fiber meals a day. Include a variety of fruits, vegetables, and whole-grain foods.     Make sure you get enough calcium in your diet. Calcium, vitamin D, and exercise help prevent osteoporosis (bone thinning).     If you live alone, try  eating with others when you can. That way you get a good meal and have company while you eat it.     Try to keep a healthy weight. If you eat more calories than your body uses for energy, it will be stored as fat and you will gain weight.     Recreation   Recreation is not limited to sports and team events. It includes any activity that provides relaxation, interest, enjoyment, and exercise. Recreation provides an outlet for physical, mental, and social energy. It can give a sense of worth and achievement. It can help you stay healthy.    Mental Exercise and Social Involvement  Mental and emotional health is as important as physical health. Keep in touch with friends and family. Stay as active as possible. Continue to learn and challenge yourself.   Things you can do to stay mentally active are:    Learn something new, like a foreign language or musical instrument.     Play SCRABBLE or do crossword puzzles. If you cannot find people to play these games with you at home, you can play them with others on your computer through the Internet.     Join a games club--anything from card games to chess or checkers or lawn bowling.     Start a new hobby.     Go back to school.     Volunteer.     Read.   Keep up with world events.    Understanding USDA MyPlate  The USDA (U.S. Department of Agriculture) has guidelines to help you make healthy food choices. These are called MyPlate. MyPlate shows the food groups that make up healthy meals using the image of a place setting. Before you eat, think about the healthiest choices for what to put onto your plate or into your cup or bowl. To learn more about building a healthy plate, visit www.choosemyplate.gov.    The food groups    Fruits. Any fruit or 100% fruit juice counts as part of the Fruit Group. Fruits may be fresh, canned, frozen, or dried, and may be whole, cut-up, or pureed. Make half your plate fruits and vegetables.    Vegetables. Any vegetable or 100% vegetable juice  counts as a member of the Vegetable Group. Vegetables may be fresh, frozen, canned, or dried. They can be served raw or cooked and may be whole, cut-up, or mashed. Make half your plate fruits and vegetables.    Grains. All foods made from grains are part of the Grains Group. These include wheat, rice, oats, cornmeal, and barley such as bread, pasta, oatmeal, cereal, tortillas, and grits. Grains should be no more than a quarter of your plate. At least half of your grains should be whole grains.    Protein. This group includes meat, poultry, seafood, beans and peas, eggs, processed soy products (like tofu), nuts (including nut butters), and seeds. Make protein choices no more than a quarter of your plate. Meat and poultry choices should be lean or low fat.    Dairy. All fluid milk products and foods made from milk that contain calcium, like yogurt and cheese, are part of the Dairy Group. (Foods that have little calcium, such as cream, butter, and cream cheese, are not part of the group.) Most dairy choices should be low-fat or fat-free.    Oils. These are fats that are liquid at room temperature. They include canola, corn, olive, soybean, and sunflower oil. Foods that are mainly oil include mayonnaise, certain salad dressings, and soft margarines. You should have only 5 to 7 teaspoons of oils a day. You probably already get this much from the food you eat.  Date Last Reviewed: 8/1/2017 2000-2018 [employer]. [address] [place] [address]. All rights reserved. This information is not intended as a substitute for professional medical care. Always follow your healthcare professional's instructions.        Activities of Daily Living    Your Health Risk Assessment indicates you have difficulties with activities of daily living such as housework, bathing, preparing meals, taking medication, etc. Please make a follow up appointment for us to address this issue in more detail.

## 2019-08-16 NOTE — PROGRESS NOTES
The patient was provided with suggestions to help her develop a healthy physical lifestyle.  The patient was counseled and encouraged to consider modifying their diet and eating habits. She was provided with information on recommended healthy diet options.  The patient reports that she has difficulty with activities of daily living. I have asked that the patient make a follow up appointment in 52 weeks where this issue will be further evaluated and addressed.

## 2019-08-16 NOTE — NURSING NOTE
Clinic Administered Medication Documentation      Injectable Medication Documentation    Patient was given Td. Prior to medication administration, verified patients identity using patient s name and date of birth. Please see MAR and medication order for additional information. Patient instructed to remain in clinic for 15 minutes and report any adverse reaction to staff immediately .      Was entire vial of medication used? Yes  Vial/Syringe: Syringe  Expiration Date:  10/23/2020  Was this medication supplied by the patient? No     ................Eduardo Mancuso LPN,   August 16, 2019,      12:18 PM,   [facility] ([facility]3]

## 2019-08-28 DIAGNOSIS — N39.0 FREQUENT UTI: Primary | ICD-10-CM

## 2019-08-28 DIAGNOSIS — N39.0 CHRONIC UTI: ICD-10-CM

## 2019-08-28 DIAGNOSIS — N39.0 FREQUENT UTI: ICD-10-CM

## 2019-08-28 LAB
ALBUMIN UR-MCNC: NEGATIVE MG/DL
APPEARANCE UR: CLEAR
BACTERIA #/AREA URNS HPF: ABNORMAL /HPF
BILIRUB UR QL STRIP: NEGATIVE
COLOR UR AUTO: YELLOW
GLUCOSE UR STRIP-MCNC: NEGATIVE MG/DL
HGB UR QL STRIP: NEGATIVE
KETONES UR STRIP-MCNC: NEGATIVE MG/DL
LEUKOCYTE ESTERASE UR QL STRIP: ABNORMAL
NITRATE UR QL: NEGATIVE
PH UR STRIP: 6.5 PH (ref 5–7)
RBC #/AREA URNS AUTO: ABNORMAL /HPF
SOURCE: ABNORMAL
SP GR UR STRIP: >1.03 (ref 1–1.03)
UROBILINOGEN UR STRIP-ACNC: 0.2 EU/DL (ref 0.2–1)
WBC #/AREA URNS AUTO: ABNORMAL /HPF

## 2019-08-28 PROCEDURE — 87086 URINE CULTURE/COLONY COUNT: CPT | Performed by: NURSE PRACTITIONER

## 2019-08-28 PROCEDURE — 87088 URINE BACTERIA CULTURE: CPT | Performed by: NURSE PRACTITIONER

## 2019-08-28 PROCEDURE — 87186 SC STD MICRODIL/AGAR DIL: CPT | Performed by: NURSE PRACTITIONER

## 2019-08-28 PROCEDURE — 81001 URINALYSIS AUTO W/SCOPE: CPT | Performed by: NURSE PRACTITIONER

## 2019-08-30 LAB
BACTERIA SPEC CULT: ABNORMAL
Lab: ABNORMAL
SPECIMEN SOURCE: ABNORMAL

## 2019-09-03 DIAGNOSIS — R31.9 URINARY TRACT INFECTION WITH HEMATURIA, SITE UNSPECIFIED: Primary | ICD-10-CM

## 2019-09-03 DIAGNOSIS — N39.0 URINARY TRACT INFECTION WITH HEMATURIA, SITE UNSPECIFIED: Primary | ICD-10-CM

## 2019-09-03 RX ORDER — NITROFURANTOIN 25; 75 MG/1; MG/1
100 CAPSULE ORAL 2 TIMES DAILY
Qty: 10 CAPSULE | Refills: 0 | Status: SHIPPED | OUTPATIENT
Start: 2019-09-03 | End: 2019-10-02

## 2019-09-24 ENCOUNTER — TELEPHONE (OUTPATIENT)
Dept: FAMILY MEDICINE | Facility: OTHER | Age: 34
End: 2019-09-24

## 2019-09-27 ENCOUNTER — ALLIED HEALTH/NURSE VISIT (OUTPATIENT)
Dept: FAMILY MEDICINE | Facility: OTHER | Age: 34
End: 2019-09-27
Payer: MEDICARE

## 2019-09-27 VITALS — SYSTOLIC BLOOD PRESSURE: 122 MMHG | DIASTOLIC BLOOD PRESSURE: 68 MMHG

## 2019-09-27 PROCEDURE — 96372 THER/PROPH/DIAG INJ SC/IM: CPT

## 2019-09-27 PROCEDURE — 99207 ZZC NO CHARGE NURSE ONLY: CPT

## 2019-09-27 RX ADMIN — MEDROXYPROGESTERONE ACETATE 150 MG: 150 INJECTION, SUSPENSION INTRAMUSCULAR at 09:58

## 2019-09-27 NOTE — PROGRESS NOTES
Clinic Administered Medication Documentation    MEDICATION LIST:   Depo Provera Documentation    Prior to injection, verified patient identity using patient's name and date of birth. Medication was administered. Please see MAR and medication order for additional information. Patient instructed to remain in clinic for 15 minutes and report any adverse reaction to staff immediately .    BP: 122/68    LAST PAP/EXAM: No results found for: PAP  URINE HCG:not indicated    NEXT INJECTION DUE: 12/13/19 - 12/27/19    Was entire vial of medication used? Yes  Vial/Syringe: Single dose vial  Expiration Date:  06/2020  Given in LD.     Berna Dawn MA

## 2019-10-02 ENCOUNTER — OFFICE VISIT (OUTPATIENT)
Dept: FAMILY MEDICINE | Facility: OTHER | Age: 34
End: 2019-10-02
Payer: MEDICARE

## 2019-10-02 VITALS
HEART RATE: 88 BPM | OXYGEN SATURATION: 98 % | RESPIRATION RATE: 16 BRPM | TEMPERATURE: 97.4 F | DIASTOLIC BLOOD PRESSURE: 80 MMHG | SYSTOLIC BLOOD PRESSURE: 114 MMHG | WEIGHT: 179 LBS | BODY MASS INDEX: 32.94 KG/M2 | HEIGHT: 62 IN

## 2019-10-02 DIAGNOSIS — Z01.818 PREOP GENERAL PHYSICAL EXAM: Primary | ICD-10-CM

## 2019-10-02 DIAGNOSIS — F84.0 ACTIVE AUTISTIC DISORDER: ICD-10-CM

## 2019-10-02 PROCEDURE — 99214 OFFICE O/P EST MOD 30 MIN: CPT | Performed by: NURSE PRACTITIONER

## 2019-10-02 RX ORDER — QUETIAPINE FUMARATE 100 MG/1
150 TABLET, FILM COATED ORAL DAILY
Qty: 28 TABLET | Refills: 3 | COMMUNITY
Start: 2019-10-02 | End: 2021-04-22

## 2019-10-02 ASSESSMENT — MIFFLIN-ST. JEOR: SCORE: 1470.59

## 2019-10-02 NOTE — PROGRESS NOTES
[place] [address] [facility] [id number] [phone]  Dept: [phone]    PRE-OP EVALUATION:  Today's date: 10/2/2019    Gemini Mccabe (: 1985) presents for pre-operative evaluation assessment as requested by Dentist.  She requires evaluation and anesthesia risk assessment prior to undergoing surgery/procedure for treatment of dental work .    Proposed Surgery/ Procedure: Dental work  Date of Surgery/ Procedure: 10/16/19  Time of Surgery/ Procedure: [facility]  Hospital/Surgical Facility: [facility], [place], [place]  Fax number for surgical facility: [phone]  Primary Physician: Yissel Chandler  Type of Anesthesia Anticipated: General    Patient has a Health Care Directive or Living Will:  YES    1. NO - Do you have a history of heart attack, stroke, stent, bypass or surgery on an artery in the head, neck, heart or legs?  2. NO - Do you ever have any pain or discomfort in your chest?  3. NO - Do you have a history of  Heart Failure?  4. NO - Are you troubled by shortness of breath when: walking on the level, up a slight hill or at night?  5. NO - Do you currently have a cold, bronchitis or other respiratory infection?  6. NO - Do you have a cough, shortness of breath or wheezing?  7. NO - Do you sometimes get pains in the calves of your legs when you walk?  8. YES - DO YOU OR ANYONE IN YOUR FAMILY HAVE PREVIOUS HISTORY OF BLOOD CLOTS? Mom did have a blood clot   9. NO - Do you or does anyone in your family have a serious bleeding problem such as prolonged bleeding following surgeries or cuts?  10. NO - Have you ever had problems with anemia or been told to take iron pills?  11. NO - Have you had any abnormal blood loss such as black, tarry or bloody stools, or abnormal vaginal bleeding?  12. NO - Have you ever had a blood transfusion?  13. NO - Have you or any of your relatives ever had problems with anesthesia?  14. YES - DO YOU HAVE SLEEP APNEA, EXCESSIVE  SNORING OR DAYTIME DROWSINESS? Daytime drowsiness  15. NO - Do you have any prosthetic heart valves?  16. NO - Do you have prosthetic joints?  17. NO - Is there any chance that you may be pregnant?      HPI:     HPI related to upcoming procedure: needs sedation for routine dental care due to her developmental delays      See problem list for active medical problems.  Problems all longstanding and stable, except as noted/documented.  See ROS for pertinent symptoms related to these conditions.      MEDICAL HISTORY:     Patient Active Problem List    Diagnosis Date Noted     Pneumonia 11/12/2010     Priority: High     Horseshoe kidney 10/05/2018     Priority: Medium     Frequent UTI 10/05/2018     Priority: Medium     UTI (urinary tract infection) 05/27/2011     Priority: Medium     seems to be resolved.  suspected renal calculi       CARDIOVASCULAR SCREENING; LDL GOAL LESS THAN 160 10/31/2010     Priority: Medium     Chronic UTI 01/16/2010     Priority: Medium     Active autistic disorder      Priority: Medium     Problem list name updated by automated process. Provider to review       Mental retardation 11/10/2003     Priority: Medium     Problem list name updated by automated process. Provider to review       Epilepsy (H) 11/10/2003     Priority: Medium     Problem list name updated by automated process. Provider to review       Absence of menstruation 11/10/2003     Priority: Medium     Constipation 11/10/2003     Priority: Medium     Problem list name updated by automated process. 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 500 mg, 1 capsule daily       Disposable Gloves (NITRILE GLOVES MEDIUM) MISC USE EVERY DAY AS NEEDED  100 each 1     loratadine (CLARITIN) 10 MG tablet Take 1 tablet (10 mg) by mouth daily 90 tablet 3     LORazepam (ATIVAN) 0.5 MG tablet Take 1 tablet (0.5 mg) by mouth 2 times daily as needed for agitation or anxiety Not to exceed 2 tabs in 24 hours, try Risperdal first 60 tablet 1     Multiple Vitamin (TAB-A-DAVID) TABS TAKE 1 TABLET BY MOUTH ONCE DAILY 28 tablet 3     Nebulizer nebulization 4 times daily. Use 4 times a day as instructed 1 Device 0     QUEtiapine (SEROQUEL) 100 MG tablet Take 1 tablet (100 mg) by mouth daily Take 200mg at bedtime. (total of 300mg daily) 28 tablet 3     risperiDONE (RISPERDAL) 0.25 MG tablet Take 1 tablet (0.25 mg) by mouth daily       risperiDONE (RISPERDAL) 0.5 MG tablet Take 1 tablet (0.5 mg) by mouth as needed (up to 2 times daily)       vitamin C (ASCORBIC ACID) 500 MG tablet TAKE 1 TABLET BY MOUTH ONCE DAILY 28 tablet 3     OTC products: see med list    Allergies   Allergen Reactions     Prevnar      Sulfa Drugs Nausea and Vomiting      Latex Allergy: NO    Social History     Tobacco Use     Smoking status: Passive Smoke Exposure - Never Smoker     Smokeless tobacco: Never Used   Substance Use Topics     Alcohol use: No     History   Drug Use No       REVIEW OF SYSTEMS:   CONSTITUTIONAL: NEGATIVE for fever, chills, change in weight  INTEGUMENTARY/SKIN: NEGATIVE for worrisome rashes, moles or lesions  EYES: NEGATIVE for vision changes or irritation  ENT/MOUTH: NEGATIVE for ear, mouth and throat problems  RESP: NEGATIVE for significant cough or SOB  BREAST: NEGATIVE for masses,  "tenderness or discharge  CV: NEGATIVE for chest pain, palpitations or peripheral edema  GI: NEGATIVE for nausea, abdominal pain, heartburn, or change in bowel habits  : NEGATIVE for frequency, dysuria, or hematuria  MUSCULOSKELETAL: NEGATIVE for significant arthralgias or myalgia  NEURO: NEGATIVE for weakness, dizziness or paresthesias  ENDOCRINE: NEGATIVE for temperature intolerance, skin/hair changes  HEME: NEGATIVE for bleeding problems  PSYCHIATRIC: NEGATIVE for changes in mood or affect    EXAM:   /80   Pulse 88   Temp 97.4  F (36.3  C) (Temporal)   Resp 16   Ht 1.583 m (5' 2.34\")   Wt 81.2 kg (179 lb)   LMP  (LMP Unknown)   SpO2 98%   Breastfeeding? No   BMI 32.38 kg/m      GENERAL APPEARANCE: healthy, alert and no distress     EYES: EOMI, PERRL     HENT: ear canals and TM's normal and nose and mouth without ulcers or lesions     NECK: no adenopathy, no asymmetry, masses, or scars and thyroid normal to palpation     RESP: lungs clear to auscultation - no rales, rhonchi or wheezes     CV: regular rates and rhythm, normal S1 S2, no S3 or S4 and no murmur, click or rub     ABDOMEN:  soft, nontender, no HSM or masses and bowel sounds normal     MS: extremities normal- no gross deformities noted, no evidence of inflammation in joints, FROM in all extremities.     SKIN: no suspicious lesions or rashes     NEURO: Normal strength and tone, sensory exam grossly normal, severely developmentally delayed and non verbal      PSYCH: mentation appears normal. and affect normal/bright     LYMPHATICS: No cervical adenopathy    DIAGNOSTICS:       Recent Labs   Lab Test 03/06/19  1318 03/07/17  1458 07/08/16 03/27/15  1820   HGB  --  14.3  --  14.7   PLT  --  205  --  153     --   --  139   POTASSIUM 4.0  --  4.1 3.6   CR 0.84  --  0.89 0.89        IMPRESSION:   Reason for surgery/procedure: routine dental care     The proposed surgical procedure is considered LOW risk.    REVISED CARDIAC RISK INDEX  The " patient has the following serious cardiovascular risks for perioperative complications such as (MI, PE, VFib and 3  AV Block):  No serious cardiac risks  INTERPRETATION: 0 risks: Class I (very low risk - 0.4% complication rate)    The patient has the following additional risks for perioperative complications:  No identified additional risks      ICD-10-CM    1. Preop general physical exam Z01.818    2. Active autistic disorder F84.0 QUEtiapine (SEROQUEL) 100 MG tablet       RECOMMENDATIONS:         --Patient is to take all scheduled medications on the day of surgery EXCEPT for modifications listed below.    APPROVAL GIVEN to proceed with proposed procedure, without further diagnostic evaluation       Signed Electronically by: BRANDON Reyes CNP    Copy of this evaluation report is provided to requesting physician.    [facility] Preop Guidelines    Revised Cardiac Risk Index

## 2019-10-16 ENCOUNTER — OFFICE VISIT (OUTPATIENT)
Dept: SLEEP MEDICINE | Facility: CLINIC | Age: 34
End: 2019-10-16
Payer: MEDICARE

## 2019-10-16 VITALS
HEART RATE: 97 BPM | SYSTOLIC BLOOD PRESSURE: 100 MMHG | DIASTOLIC BLOOD PRESSURE: 72 MMHG | WEIGHT: 179 LBS | OXYGEN SATURATION: 100 % | HEIGHT: 62 IN | BODY MASS INDEX: 32.94 KG/M2

## 2019-10-16 DIAGNOSIS — R06.83 SNORING: ICD-10-CM

## 2019-10-16 DIAGNOSIS — G47.33 OSA (OBSTRUCTIVE SLEEP APNEA): Primary | ICD-10-CM

## 2019-10-16 PROCEDURE — 99204 OFFICE O/P NEW MOD 45 MIN: CPT | Performed by: INTERNAL MEDICINE

## 2019-10-16 ASSESSMENT — MIFFLIN-ST. JEOR: SCORE: 1465.19

## 2019-10-16 NOTE — PROGRESS NOTES
SLEEP MEDICINE CLINIC NOTE   [facility]  Gemini Mccabe 33 year old female  : 1985  MRN: [id number]      PRIMARY CARE PROVIDER: Yissel Chandler    REFERRING PROVIDER: BRANDON Reyes CNP  [address] [place] [address]    Visit Date:   10/16/2019      REASON FOR VISIT:  Evaluation of sleep apnea.      HISTORY OF PRESENT ILLNESS:  The patient is a very pleasant 33-year-old female with history of autism and severe intellectual and developmental delay who is being seen in clinic for evaluation of sleep-disordered breathing.  She is accompanied today with her guardian who lives with her and has known her from her childhood.  The patient is nonverbal and is unable to provide any history.  According to her guardian, the patient stops breathing in her sleep and is excessively sleepy through the day.      She is reported to have a schedule where she goes to bed around 8:00 p.m.  Prior to that, she is often dozing off and has to be told to stay awake.  When she gets into bed, she usually sleeps within minutes.  She is known to wake up at least once through the night, where she either moves to her couch and ultimately falls asleep after sometime or she gets up and plays with water by turning the faucets on in the bathroom and goes back to sleep in her bed.  Then, she is woken up between 6:30 and 7:00 a.m.  She usually shows significant resistance while being woken up.  It is unclear whether she feels rested upon awakening or not.  She does have significant sleep inertia.  She does have excessive daytime sleepiness.  She goes to work 3 days a week from 7:30 a.m. to 2:00 p.m. and is often found sleeping at work.  When she returns from work, she wants to sleep, but her guardian encourages her not to take naps or sleep often.  The patient had been on higher doses of quetiapine, which was apparently making her more sleepy.  More recently her guardian and her psychologist are trying  "to wean her off this medication in order to reduce her excessive sleepiness.      On the days that she does not work, she can sleep in until 9:00 a.m.  There are no features of motor restlessness suggestive of restless legs syndrome.  No significant dream enactment is noted.  The patient is not able to provide any information on her dream content.  It is known that occasionally she would startle herself out of sleep at night and then she rocks herself back to sleep with her eyes closed.  There is no known dream enactment.  She is known to grind her teeth in her sleep.      It is unclear if she wakes up with headaches in the morning.  She is noted to have loud snoring all through the night.  She is known to have witnessed apneas and some snort arousals.  She does not appear to breathe through her mouth or have any difficulty breathing through her nose.  She usually sleeps on her sides.      SOCIAL HISTORY:  The patient is single, lives at home with her friend who also serves as her guardian.  She does not smoke or drink alcohol.  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      FAMILY HISTORY:  Unknown.      PAST SURGICAL HISTORY:  None.      PAST MEDICAL HISTORY:  Includes obesity, horseshoe kidney, autism with severe intellectual and developmental delay, frequent urinary tract infections, amenorrhea.      MEDICATIONS:  Include citalopram, cranberry fruit with vitamin C, loratadine, multivitamin, risperidone, quetiapine, vitamin C.      REVIEW OF SYSTEMS:  Unable to perform due to patient's intellectual disability.      PHYSICAL EXAMINATION:   /72   Pulse 97   Ht 1.575 m (5' 2\")   Wt 81.2 kg (179 lb)   LMP  (LMP Unknown)   SpO2 100%   BMI 32.74 kg/m      Significantly limited, as the patient did not allow upper airway exam.  Her BMI is 32.7.  Her neck circumference is 38 cm.      ASSESSMENT AND PLAN:  The patient is a very pleasant 33-year-old female with autism and severe " intellectual and developmental delay who is being evaluated for sleep disordered breathing.  As per her caregiver reports, the patient has witnessed apneas and very loud snoring.  She does appear to have retrognathia and a large neck, which are significant risk factors for sleep apnea.  We suggested performing polysomnography according to split-night protocol for evaluation of sleep apnea.  This will be performed in [place], given that the patient will require her guardian to stay overnight with her.  We also reviewed with the guardian the pathophysiology of obstructive sleep apnea as well as its various treatment options.  It is unclear how the patient will tolerate positive airway pressure therapy if needed.      The patient will return to clinic after the PSG is completed to review the results and discuss treatment options.         I spent a total of 45 minutes face to face with Georgia Malavemer during today's office visit. Over 50% of this time was spent counseling the patient and/or coordinating care regarding their sleep disorder.     Avinash Maldonado MD   of Medicine  Pulmonary, Critical Care and Sleep Medicine  [facility]  Pager: [phone]               D: 10/16/2019   T: 10/16/2019   MT: SHARON      Name:     GEORGIA ALLISON   MRN:      [id number]        Account:      [id number]   :      1985           Visit Date:   10/16/2019      Document: [id number]

## 2019-10-16 NOTE — NURSING NOTE
Weight management plan: Patient was referred to their PCP to discuss a diet and exercise plan.     Does Gemini have a CPAP/Bipap?  No

## 2019-10-18 ENCOUNTER — TELEPHONE (OUTPATIENT)
Dept: FAMILY MEDICINE | Facility: OTHER | Age: 34
End: 2019-10-18

## 2019-10-18 DIAGNOSIS — F79 INTELLECTUAL DISABILITY: Primary | ICD-10-CM

## 2019-10-18 NOTE — TELEPHONE ENCOUNTER
Reason for Call:  Other     Detailed comments: Can PCP or another provider since PCP out place an order for nasal swab for MRSA testing to be done same day as surgery...Tuesday 10/22/2019. Surgery is at [facility] in [facility]. Please call Rochelle back.     Phone Number Rochelle  can be reached at: Other phone number: [phone]    Best Time: any    Can we leave a detailed message on this number? YES    Call taken on 10/18/2019 at 1:46 PM by Barbara Ornelas

## 2019-10-21 NOTE — TELEPHONE ENCOUNTER
Please place order for MRSA Swab to be done at [facility] in [place].  Patient procedure is at 0930 on 10-.  Order can be faxed to [phone]    Nasra Ferreira XRO/

## 2019-10-22 ENCOUNTER — TRANSFERRED RECORDS (OUTPATIENT)
Dept: HEALTH INFORMATION MANAGEMENT | Facility: CLINIC | Age: 34
End: 2019-10-22

## 2019-10-24 ENCOUNTER — DOCUMENTATION ONLY (OUTPATIENT)
Dept: OTHER | Facility: CLINIC | Age: 34
End: 2019-10-24

## 2019-10-30 DIAGNOSIS — F84.0 ACTIVE AUTISTIC DISORDER: ICD-10-CM

## 2019-10-30 DIAGNOSIS — E56.9 VITAMIN DEFICIENCY: ICD-10-CM

## 2019-10-30 DIAGNOSIS — F79 INTELLECTUAL DISABILITY: ICD-10-CM

## 2019-10-31 RX ORDER — MULTIVITAMIN WITH FOLIC ACID 400 MCG
TABLET ORAL
Qty: 28 TABLET | Refills: 11 | Status: SHIPPED | OUTPATIENT
Start: 2019-10-31 | End: 2020-11-04

## 2019-10-31 RX ORDER — ASCORBIC ACID 500 MG
TABLET ORAL
Qty: 28 TABLET | Refills: 11 | Status: SHIPPED | OUTPATIENT
Start: 2019-10-31 | End: 2020-11-04

## 2019-10-31 NOTE — TELEPHONE ENCOUNTER
"Requested Prescriptions   Pending Prescriptions Disp Refills     Multiple Vitamin (TAB-A-DAVID) TABS [Pharmacy Med Name: MULTI-VIT (TAB-A-VIT) TABLET] 28 tablet 11     Sig: TAKE 1 TABLET BY MOUTH ONCE DAILY   Last Written Prescription Date:  6/18/18  Last Fill Quantity: 28,  # refills: 3   Last office visit: 10/2/2019 with prescribing provider:  10/2/19   Future Office Visit:   Next 5 appointments (look out 90 days)    Dec 13, 2019  2:00 PM CST  Nurse Only with [facility]) [address] [facility] [address]  [phone]           Vitamin Supplements (Adult) Protocol Passed - 10/30/2019  9:19 AM        Passed - High dose Vitamin D not ordered        Passed - Recent (12 mo) or future (30 days) visit within the authorizing provider's specialty     Patient has had an office visit with the authorizing provider or a provider within the authorizing providers department within the previous 12 mos or has a future within next 30 days. See \"Patient Info\" tab in inbasket, or \"Choose Columns\" in Meds & Orders section of the refill encounter.              Passed - Medication is active on med list        vitamin C (ASCORBIC ACID) 500 MG tablet [Pharmacy Med Name: VITAMIN C 500 MG TAB] 28 tablet 11     Sig: TAKE 1 TABLET BY MOUTH ONCE DAILY   Last Written Prescription Date:  6/18/19  Last Fill Quantity: 28,  # refills: 3   Last office visit: 10/2/2019 with prescribing provider:  10/2/19   Future Office Visit:   Next 5 appointments (look out 90 days)    Dec 13, 2019  2:00 PM CST  Nurse Only with [facility]) [address] [facility] [address]  [phone]             There is no refill protocol information for this order        "

## 2019-10-31 NOTE — TELEPHONE ENCOUNTER
Routing refill request to provider for review/approval because:  Drug not on the [facility] refill protocol (Vitamin C)    MIMI GomezN, RN  [facility]

## 2019-11-12 ENCOUNTER — APPOINTMENT (OUTPATIENT)
Dept: LAB | Facility: OTHER | Age: 34
End: 2019-11-12
Payer: MEDICARE

## 2019-11-12 PROCEDURE — 81001 URINALYSIS AUTO W/SCOPE: CPT | Performed by: NURSE PRACTITIONER

## 2019-11-12 PROCEDURE — 87086 URINE CULTURE/COLONY COUNT: CPT | Performed by: NURSE PRACTITIONER

## 2019-11-12 PROCEDURE — 87088 URINE BACTERIA CULTURE: CPT | Performed by: NURSE PRACTITIONER

## 2019-11-12 PROCEDURE — 87186 SC STD MICRODIL/AGAR DIL: CPT | Performed by: NURSE PRACTITIONER

## 2019-11-14 ENCOUNTER — TELEPHONE (OUTPATIENT)
Dept: FAMILY MEDICINE | Facility: OTHER | Age: 34
End: 2019-11-14

## 2019-11-14 DIAGNOSIS — N39.0 CHRONIC UTI: Primary | ICD-10-CM

## 2019-11-14 DIAGNOSIS — N39.0 CHRONIC UTI: ICD-10-CM

## 2019-11-14 LAB
ALBUMIN UR-MCNC: NEGATIVE MG/DL
APPEARANCE UR: CLEAR
BACTERIA #/AREA URNS HPF: ABNORMAL /HPF
BILIRUB UR QL STRIP: NEGATIVE
COLOR UR AUTO: YELLOW
GLUCOSE UR STRIP-MCNC: NEGATIVE MG/DL
HGB UR QL STRIP: ABNORMAL
KETONES UR STRIP-MCNC: NEGATIVE MG/DL
LEUKOCYTE ESTERASE UR QL STRIP: ABNORMAL
NITRATE UR QL: NEGATIVE
NON-SQ EPI CELLS #/AREA URNS LPF: ABNORMAL /LPF
PH UR STRIP: 6 PH (ref 5–7)
RBC #/AREA URNS AUTO: ABNORMAL /HPF
SOURCE: ABNORMAL
SP GR UR STRIP: >1.03 (ref 1–1.03)
UROBILINOGEN UR STRIP-ACNC: 1 EU/DL (ref 0.2–1)
WBC #/AREA URNS AUTO: ABNORMAL /HPF

## 2019-11-16 LAB
BACTERIA SPEC CULT: ABNORMAL
BACTERIA SPEC CULT: ABNORMAL
Lab: ABNORMAL
SPECIMEN SOURCE: ABNORMAL

## 2019-11-19 ENCOUNTER — TELEPHONE (OUTPATIENT)
Dept: FAMILY MEDICINE | Facility: OTHER | Age: 34
End: 2019-11-19

## 2019-11-19 DIAGNOSIS — R31.9 URINARY TRACT INFECTION WITH HEMATURIA, SITE UNSPECIFIED: Primary | ICD-10-CM

## 2019-11-19 DIAGNOSIS — N39.0 URINARY TRACT INFECTION WITH HEMATURIA, SITE UNSPECIFIED: Primary | ICD-10-CM

## 2019-11-19 RX ORDER — NITROFURANTOIN 25; 75 MG/1; MG/1
100 CAPSULE ORAL 2 TIMES DAILY
Qty: 14 CAPSULE | Refills: 0 | Status: SHIPPED | OUTPATIENT
Start: 2019-11-19 | End: 2019-11-26

## 2019-11-19 NOTE — TELEPHONE ENCOUNTER
Spoke with care giver elo WILSON on file and informed of message below. She understood.     Berna Dawn MA

## 2019-11-19 NOTE — TELEPHONE ENCOUNTER
----- Message from BRANDON Reyes CNP sent at 11/19/2019  1:57 PM CST -----  Please let her caregiver know that her urine showed an infection.  A prescription for an antibiotic has been sent to the pharmacy for this.     Electronically signed by Yissel Chandler CNP.

## 2019-11-27 DIAGNOSIS — F79 INTELLECTUAL DISABILITY: ICD-10-CM

## 2019-11-27 DIAGNOSIS — F84.0 ACTIVE AUTISTIC DISORDER: ICD-10-CM

## 2019-11-27 RX ORDER — CITALOPRAM HYDROBROMIDE 40 MG/1
TABLET ORAL
Qty: 28 TABLET | Refills: 5 | Status: SHIPPED | OUTPATIENT
Start: 2019-11-27 | End: 2020-07-10

## 2019-11-27 NOTE — TELEPHONE ENCOUNTER
"  Requested Prescriptions   Pending Prescriptions Disp Refills     citalopram (CELEXA) 40 MG tablet [Pharmacy Med Name: CITALOPRAM 40 MG TABLET] 28 tablet 5     Sig: TAKE 1 TABLET BY MOUTH ONCE DAILY   Last Written Prescription Date:  6/18/2019  Last Fill Quantity: 28,  # refills: 3   Last office visit: 10/2/2019 with prescribing provider:     Future Office Visit:   Next 5 appointments (look out 90 days)    Dec 13, 2019  2:00 PM CST  [facility] [address] [facility] [address]  [phone]             [facility] Protocol Passed - 11/27/2019 11:28 AM        Passed - Recent (12 mo) or future (30 days) visit within the authorizing provider's specialty     Patient has had an office visit with the authorizing provider or a provider within the authorizing providers department within the previous 12 mos or has a future within next 30 days. See \"Patient Info\" tab in inbasket, or \"Choose Columns\" in Meds & Orders section of the refill encounter.              Passed - Medication is active on med list        Passed - Patient is age 18 or older        Passed - No active pregnancy on record        Passed - No positive pregnancy test in last 12 months      Prescription approved per [facility] Refill Protocol.  Holly Peña RN    "

## 2020-01-09 ENCOUNTER — TELEPHONE (OUTPATIENT)
Dept: FAMILY MEDICINE | Facility: OTHER | Age: 35
End: 2020-01-09

## 2020-01-09 NOTE — TELEPHONE ENCOUNTER
Panel Management Review      Patient has the following on her problem list: None      Composite cancer screening  Chart review shows that this patient is due/due soon for the following None  Summary:    Patient is due/failing the following:   Patient was on list for HVP testing. Patient is mentally handicapped and no PAP/HPV needed. HM updated to reflect this.     Action needed:   None     Type of outreach:    NA     Questions for provider review:    None                                                                                                                                    Tabby Sundet, LPN........1/9/2020 12:47 PM

## 2020-01-10 ENCOUNTER — ALLIED HEALTH/NURSE VISIT (OUTPATIENT)
Dept: FAMILY MEDICINE | Facility: OTHER | Age: 35
End: 2020-01-10
Payer: MEDICARE

## 2020-01-10 ENCOUNTER — OFFICE VISIT (OUTPATIENT)
Dept: FAMILY MEDICINE | Facility: OTHER | Age: 35
End: 2020-01-10
Payer: MEDICARE

## 2020-01-10 VITALS
DIASTOLIC BLOOD PRESSURE: 78 MMHG | TEMPERATURE: 98.2 F | HEART RATE: 98 BPM | SYSTOLIC BLOOD PRESSURE: 116 MMHG | OXYGEN SATURATION: 99 %

## 2020-01-10 VITALS — SYSTOLIC BLOOD PRESSURE: 116 MMHG | DIASTOLIC BLOOD PRESSURE: 78 MMHG

## 2020-01-10 DIAGNOSIS — N39.0 URINARY TRACT INFECTION WITH HEMATURIA, SITE UNSPECIFIED: Primary | ICD-10-CM

## 2020-01-10 DIAGNOSIS — R31.9 URINARY TRACT INFECTION WITH HEMATURIA, SITE UNSPECIFIED: Primary | ICD-10-CM

## 2020-01-10 DIAGNOSIS — Z30.42 ENCOUNTER FOR SURVEILLANCE OF INJECTABLE CONTRACEPTIVE: Primary | ICD-10-CM

## 2020-01-10 DIAGNOSIS — R82.90 NONSPECIFIC FINDING ON EXAMINATION OF URINE: Primary | ICD-10-CM

## 2020-01-10 DIAGNOSIS — N39.0 RECURRENT URINARY TRACT INFECTION: ICD-10-CM

## 2020-01-10 DIAGNOSIS — R30.0 DYSURIA: ICD-10-CM

## 2020-01-10 LAB
ALBUMIN UR-MCNC: NEGATIVE MG/DL
APPEARANCE UR: ABNORMAL
BACTERIA #/AREA URNS HPF: ABNORMAL /HPF
BILIRUB UR QL STRIP: NEGATIVE
CAOX CRY #/AREA URNS HPF: ABNORMAL /HPF
COLOR UR AUTO: ABNORMAL
GLUCOSE UR STRIP-MCNC: NEGATIVE MG/DL
HCG UR QL: NEGATIVE
HGB UR QL STRIP: ABNORMAL
KETONES UR STRIP-MCNC: NEGATIVE MG/DL
LEUKOCYTE ESTERASE UR QL STRIP: ABNORMAL
NITRATE UR QL: POSITIVE
NON-SQ EPI CELLS #/AREA URNS LPF: ABNORMAL /LPF
PH UR STRIP: 6 PH (ref 5–7)
RBC #/AREA URNS AUTO: ABNORMAL /HPF
SOURCE: ABNORMAL
SP GR UR STRIP: >1.03 (ref 1–1.03)
UROBILINOGEN UR STRIP-ACNC: 1 EU/DL (ref 0.2–1)
WBC #/AREA URNS AUTO: ABNORMAL /HPF

## 2020-01-10 PROCEDURE — 99213 OFFICE O/P EST LOW 20 MIN: CPT | Mod: 25 | Performed by: NURSE PRACTITIONER

## 2020-01-10 PROCEDURE — 87088 URINE BACTERIA CULTURE: CPT | Performed by: NURSE PRACTITIONER

## 2020-01-10 PROCEDURE — 96372 THER/PROPH/DIAG INJ SC/IM: CPT

## 2020-01-10 PROCEDURE — 87086 URINE CULTURE/COLONY COUNT: CPT | Performed by: NURSE PRACTITIONER

## 2020-01-10 PROCEDURE — 99207 ZZC NO CHARGE NURSE ONLY: CPT

## 2020-01-10 PROCEDURE — 81001 URINALYSIS AUTO W/SCOPE: CPT | Performed by: NURSE PRACTITIONER

## 2020-01-10 PROCEDURE — 87186 SC STD MICRODIL/AGAR DIL: CPT | Performed by: NURSE PRACTITIONER

## 2020-01-10 PROCEDURE — 81025 URINE PREGNANCY TEST: CPT | Performed by: NURSE PRACTITIONER

## 2020-01-10 RX ORDER — CEPHALEXIN 500 MG/1
500 CAPSULE ORAL 3 TIMES DAILY
Qty: 21 CAPSULE | Refills: 0 | Status: SHIPPED | OUTPATIENT
Start: 2020-01-10 | End: 2020-03-04

## 2020-01-10 RX ADMIN — MEDROXYPROGESTERONE ACETATE 150 MG: 150 INJECTION, SUSPENSION INTRAMUSCULAR at 14:52

## 2020-01-10 NOTE — PROGRESS NOTES
Clinic Administered Medication Documentation    MEDICATION LIST:   Depo Provera Documentation    Prior to injection, verified patient identity using patient's name and date of birth. Medication was administered. Please see MAR and medication order for additional information. Patient instructed to remain in clinic for 15 minutes and report any adverse reaction to staff immediately .    BP: 116/78    LAST PAP/EXAM: No results found for: PAP  URINE HCG:negative    NEXT INJECTION DUE: 3/27/20 - 4/10/20    Was entire vial of medication used? Yes  Vial/Syringe: Single dose vial  Expiration Date:  09/2020    Given in RD. Arm only on patient.     Berna Dawn MA

## 2020-01-10 NOTE — PROGRESS NOTES
Subjective     Geimni Mccabe is a 34 year old female who presents to clinic today for the following health issues:    HPI   URINARY TRACT SYMPTOMS      Duration: On going since Nov doesn't seem to fully go away     Description  dysuria, frequency, urgency, hematuria, retention and incontinence    Intensity:  moderate    Accompanying signs and symptoms:  Fever/chills: no   Flank pain no   Nausea and vomiting: YES  Vaginal symptoms: light discharge  Abdominal/Pelvic Pain: no     History  History of frequent UTI's: YES  History of kidney stones: no   Sexually Active: no   Possibility of pregnancy: No    Precipitating or alleviating factors: None    Therapies tried and outcome: Cranberry juice not helping      Review of Systems   ROS COMP: Constitutional, HEENT, cardiovascular, pulmonary, gi and gu systems are negative, except as otherwise noted.      Objective    /78   Pulse 98   SpO2 99%   There is no height or weight on file to calculate BMI.  Physical Exam   GENERAL: alert, no distress and non verbal, developmentally delayed  RESP: lungs clear to auscultation - no rales, rhonchi or wheezes  CV: regular rate and rhythm, normal S1 S2, no S3 or S4, no murmur, click or rub  ABDOMEN: soft, nontender, no hepatosplenomegaly, no masses and bowel sounds normal  MS: no gross musculoskeletal defects noted, no edema    Diagnostic Test Results:  Results for orders placed or performed in visit on 01/10/20 (from the past 24 hour(s))   UA reflex to Microscopic and Culture   Result Value Ref Range    Color Urine Inna     Appearance Urine Cloudy     Glucose Urine Negative NEG^Negative mg/dL    Bilirubin Urine Negative NEG^Negative    Ketones Urine Negative NEG^Negative mg/dL    Specific Gravity Urine >1.030 1.003 - 1.035    Blood Urine Trace (A) NEG^Negative    pH Urine 6.0 5.0 - 7.0 pH    Protein Albumin Urine Negative NEG^Negative mg/dL    Urobilinogen Urine 1.0 0.2 - 1.0 EU/dL    Nitrite Urine Positive (A) NEG^Negative     Leukocyte Esterase Urine Small (A) NEG^Negative    Source Midstream Urine    HCG Qual, Urine ([id number])   Result Value Ref Range    HCG Qual Urine Negative NEG^Negative   Urine Microscopic   Result Value Ref Range    WBC Urine  (A) OTO5^0 - 5 /HPF    RBC Urine 5-10 (A) OTO2^O - 2 /HPF    Squamous Epithelial /LPF Urine Few FEW^Few /LPF    Bacteria Urine Many (A) NEG^Negative /HPF    Calcium Oxalate Few (A) NEG^Negative /HPF           Assessment & Plan     1. Urinary tract infection with hematuria, site unspecified  - cephALEXin (KEFLEX) 500 MG capsule; Take 1 capsule (500 mg) by mouth 3 times daily for 7 days  Dispense: 21 capsule; Refill: 0       See Patient Instructions    Return in about 2 weeks (around 1/24/2020) for Recheck only if not improving.    BRANDON Reyes [facility]

## 2020-01-10 NOTE — PATIENT INSTRUCTIONS
Keflex three times a day for 7 days.       A urine culture is pending and they will only call if you need a different antibiotic.  If you don't hear from us, finish all the antibiotics you were given.

## 2020-01-12 LAB
BACTERIA SPEC CULT: ABNORMAL
Lab: ABNORMAL
SPECIMEN SOURCE: ABNORMAL

## 2020-03-04 ENCOUNTER — OFFICE VISIT (OUTPATIENT)
Dept: FAMILY MEDICINE | Facility: OTHER | Age: 35
End: 2020-03-04
Payer: MEDICARE

## 2020-03-04 VITALS
HEART RATE: 101 BPM | WEIGHT: 153.7 LBS | RESPIRATION RATE: 22 BRPM | BODY MASS INDEX: 30.99 KG/M2 | HEIGHT: 59 IN | SYSTOLIC BLOOD PRESSURE: 122 MMHG | DIASTOLIC BLOOD PRESSURE: 68 MMHG | OXYGEN SATURATION: 98 % | TEMPERATURE: 98.7 F

## 2020-03-04 DIAGNOSIS — N39.0 RECURRENT URINARY TRACT INFECTION: ICD-10-CM

## 2020-03-04 DIAGNOSIS — N39.0 URINARY TRACT INFECTION WITH HEMATURIA, SITE UNSPECIFIED: Primary | ICD-10-CM

## 2020-03-04 DIAGNOSIS — Z23 NEED FOR PROPHYLACTIC VACCINATION AND INOCULATION AGAINST INFLUENZA: ICD-10-CM

## 2020-03-04 DIAGNOSIS — R82.90 NONSPECIFIC FINDING ON EXAMINATION OF URINE: Primary | ICD-10-CM

## 2020-03-04 DIAGNOSIS — R05.9 COUGH: ICD-10-CM

## 2020-03-04 DIAGNOSIS — R31.9 URINARY TRACT INFECTION WITH HEMATURIA, SITE UNSPECIFIED: Primary | ICD-10-CM

## 2020-03-04 LAB
ALBUMIN UR-MCNC: NEGATIVE MG/DL
APPEARANCE UR: ABNORMAL
BACTERIA #/AREA URNS HPF: ABNORMAL /HPF
BILIRUB UR QL STRIP: NEGATIVE
COLOR UR AUTO: YELLOW
FLUAV+FLUBV AG SPEC QL: NEGATIVE
FLUAV+FLUBV AG SPEC QL: NEGATIVE
GLUCOSE UR STRIP-MCNC: NEGATIVE MG/DL
HGB UR QL STRIP: NEGATIVE
KETONES UR STRIP-MCNC: NEGATIVE MG/DL
LEUKOCYTE ESTERASE UR QL STRIP: ABNORMAL
NITRATE UR QL: POSITIVE
NON-SQ EPI CELLS #/AREA URNS LPF: ABNORMAL /LPF
PH UR STRIP: 6 PH (ref 5–7)
RBC #/AREA URNS AUTO: ABNORMAL /HPF
SOURCE: ABNORMAL
SP GR UR STRIP: >1.03 (ref 1–1.03)
SPECIMEN SOURCE: NORMAL
TRANS CELLS #/AREA URNS HPF: ABNORMAL /HPF
UROBILINOGEN UR STRIP-ACNC: 0.2 EU/DL (ref 0.2–1)
WBC #/AREA URNS AUTO: ABNORMAL /HPF

## 2020-03-04 PROCEDURE — 87088 URINE BACTERIA CULTURE: CPT | Performed by: NURSE PRACTITIONER

## 2020-03-04 PROCEDURE — 99214 OFFICE O/P EST MOD 30 MIN: CPT | Mod: 25 | Performed by: NURSE PRACTITIONER

## 2020-03-04 PROCEDURE — 81001 URINALYSIS AUTO W/SCOPE: CPT | Performed by: NURSE PRACTITIONER

## 2020-03-04 PROCEDURE — G0008 ADMIN INFLUENZA VIRUS VAC: HCPCS | Performed by: NURSE PRACTITIONER

## 2020-03-04 PROCEDURE — 90686 IIV4 VACC NO PRSV 0.5 ML IM: CPT | Performed by: NURSE PRACTITIONER

## 2020-03-04 PROCEDURE — 87086 URINE CULTURE/COLONY COUNT: CPT | Performed by: NURSE PRACTITIONER

## 2020-03-04 PROCEDURE — 87804 INFLUENZA ASSAY W/OPTIC: CPT | Performed by: NURSE PRACTITIONER

## 2020-03-04 PROCEDURE — 87186 SC STD MICRODIL/AGAR DIL: CPT | Performed by: NURSE PRACTITIONER

## 2020-03-04 RX ORDER — CEPHALEXIN 500 MG/1
500 CAPSULE ORAL 3 TIMES DAILY
Qty: 21 CAPSULE | Refills: 0 | Status: SHIPPED | OUTPATIENT
Start: 2020-03-04 | End: 2020-03-13

## 2020-03-04 ASSESSMENT — MIFFLIN-ST. JEOR: SCORE: 1292.42

## 2020-03-04 NOTE — LETTER
[facility]  [address] [facility] [address]  Phone: [phone]    March 4, 2020        Gemini Mccabe (Cat)  [address]   [facility] [address]          To whom it may concern:    RE: Gemini Mccabe    Please excuse Cat due to being at appointment with client.    Please contact me for questions or concerns.      Sincerely,        BRANDON Reyes CNP

## 2020-03-04 NOTE — PROGRESS NOTES
"Subjective     Gemini Mccabe is a 34 year old female who presents to clinic today for the following health issues:    HPI   Acute Illness   Acute illness concerns: vomiting and diarrhea   Onset: about a week    Fever: no    Chills/Sweats: no    Headache (location?): YES    Sinus Pressure:unsure    Conjunctivitis:  no    Ear Pain: YES- patient has been more pulling on ears    Rhinorrhea: YES    Congestion: no     Sore Throat: no     Cough: no    Wheeze: no    Decreased Appetite: YES    Nausea: YES    Vomiting: YES    Diarrhea:  YES    Dysuria/Freq.: no    Fatigue/Achiness: YES    Sick/Strep Exposure: YES- exposure to influenza A     Therapies Tried and outcome: over the counter cold and flu    Last vomiting was yesterday x 1  Last diarrhea was a few days ago.  No blood in her stool.   She is not eating as well as usual.  And is urinating more often.   Family members had the stomach flu and another family member had influenza         Review of Systems   ROS COMP: Constitutional, HEENT, cardiovascular, pulmonary, gi and gu systems are negative, except as otherwise noted.      Objective    /68   Pulse 101   Temp 98.7  F (37.1  C) (Temporal)   Resp 22   Ht 1.49 m (4' 10.66\")   Wt 69.7 kg (153 lb 11.2 oz)   SpO2 98%   BMI 31.40 kg/m    Body mass index is 31.4 kg/m .  Physical Exam   GENERAL: healthy, alert and no distress  HENT: ear canals and TM's normal, nose and mouth without ulcers or lesions  NECK: no adenopathy, no asymmetry,   RESP: lungs clear to auscultation - no rales, rhonchi or wheezes  CV: regular rate and rhythm, normal S1 S2, no S3 or S4, no murmur, click or rub,  ABDOMEN: soft, nontender, no hepatosplenomegaly, no masses and bowel sounds normal  MS: no gross musculoskeletal defects noted, no edema  PSYCH: developmentally delayed.  Non-verbal.     Diagnostic Test Results:  Results for orders placed or performed in visit on 03/04/20 (from the past 24 hour(s))   Influenza A/B antigen   Result " Value Ref Range    Influenza A/B Agn Specimen Nasal     Influenza A Negative NEG^Negative    Influenza B Negative NEG^Negative     Results for orders placed or performed in visit on 03/04/20   UA reflex to Microscopic and Culture     Status: Abnormal   Result Value Ref Range    Color Urine Yellow     Appearance Urine Slightly Cloudy     Glucose Urine Negative NEG^Negative mg/dL    Bilirubin Urine Negative NEG^Negative    Ketones Urine Negative NEG^Negative mg/dL    Specific Gravity Urine >1.030 1.003 - 1.035    Blood Urine Negative NEG^Negative    pH Urine 6.0 5.0 - 7.0 pH    Protein Albumin Urine Negative NEG^Negative mg/dL    Urobilinogen Urine 0.2 0.2 - 1.0 EU/dL    Nitrite Urine Positive (A) NEG^Negative    Leukocyte Esterase Urine Small (A) NEG^Negative    Source Midstream Urine    Urine Microscopic     Status: Abnormal   Result Value Ref Range    WBC Urine 25-50 (A) OTO5^0 - 5 /HPF    RBC Urine O - 2 OTO2^O - 2 /HPF    Squamous Epithelial /LPF Urine Moderate (A) FEW^Few /LPF    Transitional Epi Moderate (A) FEW^Few /HPF    Bacteria Urine Many (A) NEG^Negative /HPF   Urine Culture Aerobic Bacterial     Status: None (Preliminary result)   Result Value Ref Range    Specimen Description Midstream Urine     Special Requests Specimen received in preservative     Culture Micro PENDING    Results for orders placed or performed in visit on 03/04/20   Influenza A/B antigen     Status: None   Result Value Ref Range    Influenza A/B Agn Specimen Nasal     Influenza A Negative NEG^Negative    Influenza B Negative NEG^Negative           Assessment & Plan     1. Urinary tract infection with hematuria, site unspecified  - cephALEXin (KEFLEX) 500 MG capsule; Take 1 capsule (500 mg) by mouth 3 times daily  Dispense: 21 capsule; Refill: 0    2. Cough  - Influenza A/B antigen    3. Need for prophylactic vaccination and inoculation against influenza  - INFLUENZA VACCINE IM > 6 MONTHS VALENT IIV4 [74075]       See Patient  Instructions    Return in about 2 weeks (around 3/18/2020) for Recheck only if not improving.    BRANDON Reyes [facility]

## 2020-03-07 LAB
BACTERIA SPEC CULT: ABNORMAL
Lab: ABNORMAL
SPECIMEN SOURCE: ABNORMAL

## 2020-03-13 ENCOUNTER — OFFICE VISIT (OUTPATIENT)
Dept: FAMILY MEDICINE | Facility: OTHER | Age: 35
End: 2020-03-13
Payer: MEDICARE

## 2020-03-13 VITALS
TEMPERATURE: 97.8 F | OXYGEN SATURATION: 97 % | SYSTOLIC BLOOD PRESSURE: 102 MMHG | DIASTOLIC BLOOD PRESSURE: 60 MMHG | WEIGHT: 152 LBS | BODY MASS INDEX: 31.06 KG/M2 | HEART RATE: 64 BPM | RESPIRATION RATE: 16 BRPM

## 2020-03-13 DIAGNOSIS — L50.9 HIVES: Primary | ICD-10-CM

## 2020-03-13 PROCEDURE — 99213 OFFICE O/P EST LOW 20 MIN: CPT | Performed by: NURSE PRACTITIONER

## 2020-03-13 ASSESSMENT — PAIN SCALES - GENERAL: PAINLEVEL: NO PAIN (0)

## 2020-03-13 NOTE — NURSING NOTE
Health Maintenance Due   Topic Date Due     PHQ-2  01/01/2020      Rima Sarmiento LPN........3/13/2020 11:01 AM

## 2020-03-13 NOTE — PROGRESS NOTES
Subjective     Gemini Mccabe is a 34 year old female who presents to clinic today for the following health issues:    HPI   Rash  Onset: off and on for months     Description:   Location: All over body   Character: blotchy, raised, red  Itching (Pruritis): no     Progression of Symptoms:  same    Accompanying Signs & Symptoms:  Fever: no   Body aches or joint pain: no   Sore throat symptoms: no   Recent cold symptoms: no     History:   Previous similar rash: no     Precipitating factors:   Exposure to similar rash: no   New exposures: None   Recent travel: no     Alleviating factors:  NA     Therapies Tried and outcome: MATEO Singletary is here with her caregiver with concerns over intermittent rashes.  Symptoms have been ongoing for several months at least.  Symptoms appear suddenly and resolve within 30 minutes.  They do not itch.  She has no symptom currently, but her caregiver has photos of her last episode.  These clearly show widespread hives on her chest, arms, back and face.      No know precipitating events.  At least once this occurred after being outside, but not always with temperature changes.  Symptoms don't seem to bother her.  She has not been ill, is acting normally otherwise.     History of sinus congestion and chronic rhinitis.  She is on Loratadine for that and it seems to help.  No other history of allergies, atopic dermatitis or asthma.  She has never had allergy testing.      Review of Systems   ROS COMP: Constitutional, HEENT, cardiovascular, pulmonary, gi and gu systems are negative, except as otherwise noted.      Objective    /60 (BP Location: Right arm, Patient Position: Sitting, Cuff Size: Adult Regular)   Pulse 64   Temp 97.8  F (36.6  C) (Temporal)   Resp 16   Wt 68.9 kg (152 lb)   SpO2 97%   Breastfeeding No   BMI 31.06 kg/m    Body mass index is 31.06 kg/m .  Physical Exam   GENERAL: alert, no distress and developmentally delayed. Acting per her normal.   NECK: no  adenopathy, no asymmetry, masses, or scars  RESP: lungs clear to auscultation - no rales, rhonchi or wheezes  CV: regular rate and rhythm, normal S1 S2, no S3 or S4, no murmur, click or rub  ABDOMEN: soft, nontender, no hepatosplenomegaly, no masses and bowel sounds normal  MS: no gross musculoskeletal defects noted, no edema  SKIN: no suspicious lesions or rashes    Diagnostic Test Results:  none         Assessment & Plan     1. Hives  Will refer to allergist for further evaluation.  These are clearly hives based on the pictures I saw today, but lasting less than 30 minutes at a time.  She is already on a Loratadine.  Will defer further antihistamines to allergist.    - ALLERGY/ASTHMA ADULT REFERRAL       See Patient Instructions    Return in about 4 weeks (around 4/10/2020) for Recheck only if not improving.    BRANDON Reyes [facility]

## 2020-04-10 ENCOUNTER — ALLIED HEALTH/NURSE VISIT (OUTPATIENT)
Dept: FAMILY MEDICINE | Facility: CLINIC | Age: 35
End: 2020-04-10
Payer: MEDICARE

## 2020-04-10 VITALS — SYSTOLIC BLOOD PRESSURE: 108 MMHG | DIASTOLIC BLOOD PRESSURE: 62 MMHG

## 2020-04-10 DIAGNOSIS — Z30.42 ENCOUNTER FOR SURVEILLANCE OF INJECTABLE CONTRACEPTIVE: Primary | ICD-10-CM

## 2020-04-10 PROCEDURE — 99207 ZZC NO CHARGE NURSE ONLY: CPT

## 2020-04-10 PROCEDURE — 96372 THER/PROPH/DIAG INJ SC/IM: CPT

## 2020-04-10 RX ADMIN — MEDROXYPROGESTERONE ACETATE 150 MG: 150 INJECTION, SUSPENSION INTRAMUSCULAR at 15:13

## 2020-04-10 NOTE — PROGRESS NOTES
Clinic Administered Medication Documentation      Depo Provera Documentation    URINE HCG: not indicated    Depo-Provera Standing Order inclusion/exclusion criteria reviewed.   Patient meets: inclusion criteria     BP: 108/62  LAST PAP/EXAM: No results found for: PAP    Prior to injection, verified patient identity using patient's name and date of birth. Medication was administered. Please see MAR and medication order for additional information.     Was entire vial of medication used? Yes  Vial/Syringe: Single dose vial  Expiration Date:  10/20    Patient instructed to remain in clinic for 15 minutes and report any adverse reaction to staff immediately .  NEXT INJECTION DUE: 6/27/20 - 7/11/20    Sophie Melvin CMA (AAMA) 4/10/2020 3:14 PM

## 2020-05-19 ENCOUNTER — TELEPHONE (OUTPATIENT)
Dept: FAMILY MEDICINE | Facility: OTHER | Age: 35
End: 2020-05-19

## 2020-05-19 DIAGNOSIS — N30.90 RECURRENT CYSTITIS: Primary | ICD-10-CM

## 2020-05-19 NOTE — TELEPHONE ENCOUNTER
Reason for Call:  Other call back    Detailed comments: Barbara calling about urologist they can finally set up an appointment with them but need some stuff from charles, needs new referral and medical release of information about UTI that you have been dealing with. Please advise     Phone Number Patient can be reached at: Cell number on file:    Telephone Information:   Mobile [phone]       Best Time: Anytime     Can we leave a detailed message on this number? YES    Call taken on 5/19/2020 at 2:18 PM by Miranda Seipiel Vaccari

## 2020-06-10 NOTE — TELEPHONE ENCOUNTER
Spoke with Marleny, relayed information to Lucretia (guardian), she will be calling to make an appointment.    Nasra Ferreira XRO/

## 2020-06-10 NOTE — TELEPHONE ENCOUNTER
Left message with Marleny to call me directly regarding referral.  Spoke with Lucretia (guardian) and updated her on process, will speak with her again tomorrow.    Nasra BUSBYO/

## 2020-06-10 NOTE — TELEPHONE ENCOUNTER
Lucretia calling back, she is unable to get a hold of the doctor at [facility], Lucretia would like a referral to a different urologist.  Lucretia is able to take patient to another location. Please call to advise

## 2020-06-16 ENCOUNTER — TELEPHONE (OUTPATIENT)
Dept: UROLOGY | Facility: CLINIC | Age: 35
End: 2020-06-16

## 2020-06-16 ENCOUNTER — TELEPHONE (OUTPATIENT)
Dept: FAMILY MEDICINE | Facility: OTHER | Age: 35
End: 2020-06-16

## 2020-06-16 DIAGNOSIS — N30.90 RECURRENT CYSTITIS: Primary | ICD-10-CM

## 2020-06-16 NOTE — TELEPHONE ENCOUNTER
Reason for Call: Request for an order or referral:    Order or referral being requested: Urology- would like to get in to see someone asap    Date needed: as soon as possible    Has the patient been seen by the PCP for this problem? YES    Additional comments: stated this was discussed with     Phone number Patient can be reached at:  Home number on file [phone] (home)    Best Time:      Can we leave a detailed message on this number?  YES    Call taken on 6/16/2020 at 8:38 AM by Yael Coto

## 2020-06-16 NOTE — TELEPHONE ENCOUNTER
Routing to covering provider to sign in absence of Yissel Chandler. Patient has frequent UTI.     Berna Dawn MA

## 2020-06-16 NOTE — TELEPHONE ENCOUNTER
Reason for Call:  Other appointment    Detailed comments: Patient's legal guardian calling, they would like to get the patient in to be seen for Recurrent cystitis [N30.90]. Please call back to advise.    Phone Number Patient can be reached at: Cell number on file:    Telephone Information:   Mobile [phone]       Best Time: any    Can we leave a detailed message on this number? YES    Call taken on 6/16/2020 at 4:39 PM by Martínez Prakash

## 2020-06-23 NOTE — TELEPHONE ENCOUNTER
Left message stating that we have availability 7/2/2020 with Dr. Garrido.   Appointment tentatively scheduled.  Cheli Godoy RN

## 2020-07-02 ENCOUNTER — OFFICE VISIT (OUTPATIENT)
Dept: UROLOGY | Facility: CLINIC | Age: 35
End: 2020-07-02
Payer: MEDICARE

## 2020-07-02 DIAGNOSIS — N30.00 ACUTE CYSTITIS WITHOUT HEMATURIA: Primary | ICD-10-CM

## 2020-07-02 PROCEDURE — 51798 US URINE CAPACITY MEASURE: CPT | Performed by: UROLOGY

## 2020-07-02 PROCEDURE — 99203 OFFICE O/P NEW LOW 30 MIN: CPT | Mod: 25 | Performed by: UROLOGY

## 2020-07-02 NOTE — PROGRESS NOTES
"Gemini Mccabe is a 34 year old female seen in consultation for rec UTI. Consult from Yissel Chandler.  (All info is from Guardian, Lucretia Carter, as pt does not communicate).      Concern today is persistent UTI. Pt has been on numerous rounds of PO abx, seems to help for a short while, then recurs. Does not feel as though she has UTI today. Specifically denies dysuria, frequency, systemic toxicity.    Pt with lifelong hx severe constipation. Pt has been on Miralax intermittently but compliance with this regimen seems to be spotty. Pt now goes \"many days\" between BM's.    Pt also with hx of infrequent voiding, often going \"up to eleven hours\" between voids.     Wipes properly by caregiver. Uses tub only sparingly.     Some urinary incontinence, seems to be related to infrequent voiding. No significant fecal incontinence. Moderate fluids.      2015:   at [facility]: Severe constipation and UTI; WILLIAM shows normal left kidney, no discrete right kidney, suggestion of possible horseshoe kidney; rx with Miralax    2018: More  at [facility]: Still with UTI's by report; stressed timed voiding and rx of constipation with Miralax    Possible hx of ureteral reimplantation    Reviewed PMH in detail including profound mental retardation, epilepsy, autism, etc.        Past Medical History:   Diagnosis Date     Absence of menstruation     due to depo provera     Autistic disorder, current or active state      Chronic UTI      Other specified congenital anomaly of kidney     solitary left kidney     Unspecified constipation      Unspecified epilepsy without mention of intractable epilepsy     Seizure disorder-last 1998, not on meds     Unspecified intellectual disabilities     Non verbal       Past Surgical History:   Procedure Laterality Date     C REIMPLANT URETER,SINGLE URETER  1985       Social History     Socioeconomic History     Marital status: Single     Spouse name: Not on file     Number of children: Not on file     " Years of education: Not on file     Highest education level: Not on file   Occupational History     Not on file   Social Needs     Financial resource strain: Not on file     Food insecurity     Worry: Not on file     Inability: Not on file     Transportation needs     Medical: Not on file     Non-medical: Not on file   Tobacco Use     Smoking status: Passive Smoke Exposure - Never Smoker     Smokeless tobacco: Never Used   Substance and Sexual Activity     Alcohol use: No     Drug use: No     Sexual activity: Never   Lifestyle     Physical activity     Days per week: Not on file     Minutes per session: Not on file     Stress: Not on file   Relationships     Social connections     Talks on phone: Not on file     Gets together: Not on file     Attends Faith service: Not on file     Active member of club or organization: Not on file     Attends meetings of clubs or organizations: Not on file     Relationship status: Not on file     Intimate partner violence     Fear of current or ex partner: Not on file     Emotionally abused: Not on file     Physically abused: Not on file     Forced sexual activity: Not on file   Other Topics Concern      Service Not Asked     Blood Transfusions No     Caffeine Concern Not Asked     Occupational Exposure Not Asked     Hobby Hazards Not Asked     Sleep Concern Not Asked     Stress Concern Not Asked     Weight Concern Not Asked     Special Diet Not Asked     Back Care No     Exercise No     Bike Helmet No     Seat Belt Yes     Self-Exams No     Parent/sibling w/ CABG, MI or angioplasty before 65F 55M? Not Asked   Social History Narrative     Not on file           Physical Exam:    GENL: NAD. (Remainder of exam deferred)       Bladder scan: 156 ml (several hours since voiding)      Lab review:  4/28/14 KUB: Large amount of stool in colon    11/29/07 KUB: Large amount of stool in colon    3/31/08 Renal WILLIAM for UTI: Normal left kidney, right kidney absent    5/21/11 CT  abd/pelvis: 2 mm stone left kidney, right kidney absent, much stool in colon    2/8/16  UC: benign  4/7/16  UC: benign    2/23/18 UC: E coli  3/24/18 UC: E coli  4/12/18 UC: E coli  5/8/18  UC: E coli  9/24/18 UC: E coli    3/11/19 UC: E coli  8/28/19 UC: E coli  11/12/19 UC: E coli    1/10/20 UC: E coli (low counts)  3/4/20  UC: E coli      Today:  (Pt unable to void today)      IMP:  1. Persistent vs recurrent UTI  2. Chronic constipation and infrequent voiding  3. Hx right nephrectomy  4. Hx nephrolithiasis  5. Severe MR, other medical issues      PLAN:  1. Discussed situation with patient in detail.  2. Recommend aggressive rx of constipation; caregiver expresses understanding  3. Timed voiding q 2 hrs during the day; they express understanding  4. Renal/bladder WILLIAM >> RTC to review images  5. Set realistic expectations  6. 40 minutes spent with patient, more than 50% in counseling and coordination of care for UTI, which did not include time spent for the procedure.

## 2020-07-06 ENCOUNTER — ALLIED HEALTH/NURSE VISIT (OUTPATIENT)
Dept: FAMILY MEDICINE | Facility: CLINIC | Age: 35
End: 2020-07-06
Payer: MEDICARE

## 2020-07-06 DIAGNOSIS — Z30.42 ENCOUNTER FOR SURVEILLANCE OF INJECTABLE CONTRACEPTIVE: Primary | ICD-10-CM

## 2020-07-06 PROCEDURE — 99207 ZZC NO CHARGE NURSE ONLY: CPT

## 2020-07-06 PROCEDURE — 96372 THER/PROPH/DIAG INJ SC/IM: CPT

## 2020-07-06 RX ADMIN — MEDROXYPROGESTERONE ACETATE 150 MG: 150 INJECTION, SUSPENSION INTRAMUSCULAR at 15:19

## 2020-07-06 NOTE — PROGRESS NOTES
Patient instructed to remain in clinic for 20 minutes afterwards, and to report any adverse reaction to me immediately.  Prior to injection verified patient identity using patient's name and date of birth.  depo provera injection.    OBINNA Pulliam

## 2020-07-09 DIAGNOSIS — F84.0 ACTIVE AUTISTIC DISORDER: ICD-10-CM

## 2020-07-09 DIAGNOSIS — F79 INTELLECTUAL DISABILITY: ICD-10-CM

## 2020-07-10 RX ORDER — CITALOPRAM HYDROBROMIDE 40 MG/1
TABLET ORAL
Qty: 28 TABLET | Refills: 0 | Status: SHIPPED | OUTPATIENT
Start: 2020-07-10 | End: 2020-10-07

## 2020-07-10 NOTE — TELEPHONE ENCOUNTER
Routing refill request to provider for review/approval because:  A break in medication, patient should have been out of medication 4 months ago    KATIA Gomez, RN  [facility]

## 2020-07-16 ENCOUNTER — TELEPHONE (OUTPATIENT)
Dept: FAMILY MEDICINE | Facility: OTHER | Age: 35
End: 2020-07-16

## 2020-07-16 ENCOUNTER — HOSPITAL ENCOUNTER (OUTPATIENT)
Dept: ULTRASOUND IMAGING | Facility: CLINIC | Age: 35
Discharge: HOME | End: 2020-07-16
Attending: UROLOGY | Admitting: UROLOGY
Payer: MEDICARE

## 2020-07-16 ENCOUNTER — APPOINTMENT (OUTPATIENT)
Dept: LAB | Facility: CLINIC | Age: 35
End: 2020-07-16
Payer: MEDICARE

## 2020-07-16 PROCEDURE — 76770 US EXAM ABDO BACK WALL COMP: CPT

## 2020-07-16 NOTE — TELEPHONE ENCOUNTER
"Somebody, caregiver, just dropped off the urine stating \"we bring one in whenever we are able to because it's so hard to get it\". They didn't mention to lab if there were any sx.  ................Eduardo Mancuso LPN,   July 16, 2020,      2:50 PM,   [facility]    "

## 2020-07-16 NOTE — TELEPHONE ENCOUNTER
[place] lab called RN. She said a lady came into lab today and dropped of a urine sample for patient.  said the lady told her the patient is a hard one to get a sample so when they are able to get one, they bring it in.     Lab has no urine orders. Lab said they did not toss the urine as they wanted to save it since she is hard to get a sample from.     Lab is wondering if there was any orders Yissel wanted to put in for the patient. They have the urine saved. Looks like patient saw urology 7/2/2020 and had a renal US done today. Not sure if it was to check for UA?    They would like a call back if orders are placed [phone].     Will route to PCP to review.     Yi Juárez, MIMIN, RN  [facility]

## 2020-07-16 NOTE — TELEPHONE ENCOUNTER
I reviewed the visit note from Urology and they did not mention needing any urine samples and without symptoms I do not feel this is needed either.      Electronically signed by Yissel Chandler CNP.

## 2020-07-23 ENCOUNTER — OFFICE VISIT (OUTPATIENT)
Dept: UROLOGY | Facility: CLINIC | Age: 35
End: 2020-07-23
Payer: MEDICARE

## 2020-07-23 DIAGNOSIS — N30.00 ACUTE CYSTITIS WITHOUT HEMATURIA: Primary | ICD-10-CM

## 2020-07-23 LAB
ALBUMIN UR-MCNC: 30 MG/DL
APPEARANCE UR: ABNORMAL
BACTERIA #/AREA URNS HPF: ABNORMAL /HPF
BILIRUB UR QL STRIP: NEGATIVE
COLOR UR AUTO: YELLOW
GLUCOSE UR STRIP-MCNC: NEGATIVE MG/DL
HGB UR QL STRIP: ABNORMAL
KETONES UR STRIP-MCNC: ABNORMAL MG/DL
LEUKOCYTE ESTERASE UR QL STRIP: ABNORMAL
NITRATE UR QL: POSITIVE
NON-SQ EPI CELLS #/AREA URNS LPF: ABNORMAL /LPF
PH UR STRIP: 7 PH (ref 5–7)
RBC #/AREA URNS AUTO: ABNORMAL /HPF
SOURCE: ABNORMAL
SP GR UR STRIP: 1.02 (ref 1–1.03)
TRANS CELLS #/AREA URNS HPF: ABNORMAL /HPF
UROBILINOGEN UR STRIP-ACNC: 4 EU/DL (ref 0.2–1)
WBC #/AREA URNS AUTO: ABNORMAL /HPF

## 2020-07-23 PROCEDURE — 99214 OFFICE O/P EST MOD 30 MIN: CPT | Performed by: UROLOGY

## 2020-07-23 PROCEDURE — 81001 URINALYSIS AUTO W/SCOPE: CPT | Performed by: UROLOGY

## 2020-07-23 NOTE — PROGRESS NOTES
"F/u recurrent vs persistent UTI, chronic constipation and infrequent voiding, hx right nephrectomy and nephrolithiasis, severe MR, other medical issues.    (Presents again with her caregiver)    Pt seems to be doing well. \"She doesn't have any of the symptoms she gets when she has infections in the past like pain when she pees.\" No constitutional sx's.    Still with severe constipation; family has been reluctant to use laxitives in the past; now using half dose Miralax daily; has \"blow outs\" followed by small amounts of BM.    Also using timed voiding; pt is often reluctant to void, then voids into her pad.       Current Outpatient Medications   Medication     citalopram (CELEXA) 40 MG tablet     CRANBERRY FRUIT PO     Disposable Gloves (NITRILE GLOVES MEDIUM) MISC     loratadine (CLARITIN) 10 MG tablet     LORazepam (ATIVAN) 0.5 MG tablet     Multiple Vitamin (TAB-A-DAVID) TABS     Nebulizer nebulization     QUEtiapine (SEROQUEL) 100 MG tablet     risperiDONE (RISPERDAL) 0.25 MG tablet     risperiDONE (RISPERDAL) 0.5 MG tablet     vitamin C (ASCORBIC ACID) 500 MG tablet     No current facility-administered medications for this visit.          Renal WILLIAM from 7/16/20 reviewed with patient in detail: Solitary left kidney with no hydro or stone. Satisfactory bladder volume (shared with patient)        Results for orders placed or performed in visit on 07/23/20   UA reflex to Microscopic     Status: Abnormal   Result Value Ref Range    Color Urine Yellow     Appearance Urine Cloudy     Glucose Urine Negative NEG^Negative mg/dL    Bilirubin Urine Negative NEG^Negative    Ketones Urine Trace (A) NEG^Negative mg/dL    Specific Gravity Urine 1.020 1.003 - 1.035    Blood Urine Trace (A) NEG^Negative    pH Urine 7.0 5.0 - 7.0 pH    Protein Albumin Urine 30 (A) NEG^Negative mg/dL    Urobilinogen Urine 4.0 (H) 0.2 - 1.0 EU/dL    Nitrite Urine Positive (A) NEG^Negative    Leukocyte Esterase Urine Moderate (A) NEG^Negative    " Source Unspecified Urine        IMP:  1. Recurrent UTI's, solitary kidney, hx nephrolithiasis; stable  2. Assymptomatic bactiuria today  3. Severe chronic constipation, infrequent voiding, mental retardation      PLAN:  1. Discussed situation with patient and caregiver in detail  2. Stressed importance of constipation rx and timed voiding; both pt and caregiver express understanding and importance, especially in light of solitary kidney  3. Discussed nature of assymptomatic bactiuria in detail; at this point we will refrain from rx unless pt develops sx's suggestive of active infection  4. RTC prn  5. 30 minutes spent with patient, more than 50% in counseling and coordination of care for UTI, which did not include time spent for the procedure.

## 2020-09-08 DIAGNOSIS — J30.9 ALLERGIC RHINITIS, UNSPECIFIED SEASONALITY, UNSPECIFIED TRIGGER: ICD-10-CM

## 2020-09-10 ENCOUNTER — TELEPHONE (OUTPATIENT)
Dept: FAMILY MEDICINE | Facility: OTHER | Age: 35
End: 2020-09-10

## 2020-09-10 DIAGNOSIS — Z30.42 ENCOUNTER FOR SURVEILLANCE OF INJECTABLE CONTRACEPTIVE: Primary | ICD-10-CM

## 2020-09-10 RX ORDER — LORATADINE 10 MG/1
TABLET ORAL
Qty: 28 TABLET | Refills: 5 | Status: SHIPPED | OUTPATIENT
Start: 2020-09-10

## 2020-09-10 RX ORDER — MEDROXYPROGESTERONE ACETATE 150 MG/ML
150 INJECTION, SUSPENSION INTRAMUSCULAR
Status: COMPLETED | OUTPATIENT
Start: 2020-09-21 | End: 2021-06-01

## 2020-09-10 RX ORDER — MEDROXYPROGESTERONE ACETATE 150 MG/ML
150 INJECTION, SUSPENSION INTRAMUSCULAR
Qty: 4 ML | Refills: 4 | Status: CANCELLED | OUTPATIENT
Start: 2020-09-21 | End: 2021-06-19

## 2020-09-10 NOTE — TELEPHONE ENCOUNTER
Prescription approved per [facility] Refill Protocol.    MIMI GomezN, RN  [facility]

## 2020-09-10 NOTE — TELEPHONE ENCOUNTER
Please bhakti up order.  She does not need a visit.     Electronically signed by Yissel Chandler CNP.

## 2020-09-10 NOTE — TELEPHONE ENCOUNTER
Reason for Call:  Other call back    Detailed comments: Patients Guardian Lucretia calling and wasn't aware she is over due for her depo and was told it needs to be renewed. Doesn't know if she needs to be seen or if it can just be renewed.  Please advise     Phone Number Patient can be reached at: Home number on file [phone] (home)    Best Time: anytime     Can we leave a detailed message on this number? YES    Call taken on 9/10/2020 at 10:44 AM by Miranda Seipel Vaccari

## 2020-10-06 DIAGNOSIS — F84.0 ACTIVE AUTISTIC DISORDER: ICD-10-CM

## 2020-10-06 DIAGNOSIS — F79 INTELLECTUAL DISABILITY: ICD-10-CM

## 2020-10-07 RX ORDER — CITALOPRAM HYDROBROMIDE 40 MG/1
TABLET ORAL
Qty: 28 TABLET | Refills: 5 | Status: SHIPPED | OUTPATIENT
Start: 2020-10-07

## 2020-10-07 NOTE — TELEPHONE ENCOUNTER
Prescription approved per [facility] Refill Protocol.    Bev Enamorado RN

## 2020-10-12 ENCOUNTER — TELEPHONE (OUTPATIENT)
Dept: FAMILY MEDICINE | Facility: OTHER | Age: 35
End: 2020-10-12

## 2020-10-12 DIAGNOSIS — Z30.42 ENCOUNTER FOR SURVEILLANCE OF INJECTABLE CONTRACEPTIVE: Primary | ICD-10-CM

## 2020-10-12 DIAGNOSIS — K59.00 CONSTIPATION, UNSPECIFIED CONSTIPATION TYPE: Primary | ICD-10-CM

## 2020-10-12 RX ORDER — POLYETHYLENE GLYCOL 3350 17 G/17G
POWDER, FOR SOLUTION ORAL
Qty: 30 PACKET | Refills: 0 | Status: SHIPPED | OUTPATIENT
Start: 2020-10-12 | End: 2021-11-04

## 2020-10-12 NOTE — TELEPHONE ENCOUNTER
Please call and explain that as the patient is overdue for depo injection it is policy to have her submit a urine pregnancy test to ensure that she is not pregnant prior to receiving injection. I have placed an order for this to be done.     Matthew Cruz PA-C on 10/12/2020 at 1:47 PM

## 2020-10-12 NOTE — TELEPHONE ENCOUNTER
If the patient lives at home and does not have contact with any males then no we can administer a depo. However, if she lives at a facility where there are opposite sex individuals then we are medically obligated to check for possible pregnancy.     Matthew Cruz PA-C on 10/12/2020 at 3:41 PM

## 2020-10-12 NOTE — TELEPHONE ENCOUNTER
Qasim, this pt is mentally retarded and has autism, do you still want her to do a preg test?  ................Eduardo Mancuso LPN,   October 12, 2020,      2:51 PM,   [facility]

## 2020-10-12 NOTE — TELEPHONE ENCOUNTER
Reason for Call:  Medication or medication refill:    Do you use a [facility]?  Name of the pharmacy and phone number for the current request:  [facility] in [place]     Name of the medication requested: Miralax     Other request: Lucretia called to inform you that the urologist that Gemini was seen by is suggesting she go back on Miralax. Requesting a prescription for this and her insurance will cover it for her.   Can we leave a detailed message on this number? YES    Phone number patient can be reached at: Other phone number:  Lucretia/ [phone]     Best Time: any     Call taken on 10/12/2020 at 1:20 PM by Alice Olsen

## 2020-10-12 NOTE — TELEPHONE ENCOUNTER
Routing to Matthew Cruz to advise. Patient normally sees Charles Chandler and she is out of office until 10/20/2020. Normally charles does not have her do a pregnancy test. She is out of her window time frame for this depo. Please advise on if okay to have Depo done on float schedule without a pregnancy test. Regina is coming to you talk about this.     Berna Dawn MA

## 2020-10-12 NOTE — TELEPHONE ENCOUNTER
Lucretia states that they missed the patient's scheduled visit for a depo inj today because their power went out to the whole block.  (she does not require a pregnancy test) they are wondering if they can be worked in for this today or tomorrow.  Please call back.

## 2020-10-12 NOTE — TELEPHONE ENCOUNTER
"So to clarify, she wants an appointment with me to not do a pregnancy test but to give her a Depo-Provera shot??    Why does she not need a pregnancy test?    Is this a float nurse procedure?  Or does she need an appointment?    Do I simply need to order something?    Not real familiar with the whole \"Depo shot thing\".       Kristian"

## 2020-10-13 ENCOUNTER — TELEPHONE (OUTPATIENT)
Dept: FAMILY MEDICINE | Facility: OTHER | Age: 35
End: 2020-10-13

## 2020-10-13 DIAGNOSIS — K59.00 CONSTIPATION, UNSPECIFIED CONSTIPATION TYPE: Primary | ICD-10-CM

## 2020-10-13 DIAGNOSIS — Z30.42 ENCOUNTER FOR SURVEILLANCE OF INJECTABLE CONTRACEPTIVE: ICD-10-CM

## 2020-10-13 NOTE — TELEPHONE ENCOUNTER
I called this patient caregiver and informed her that Gemini will need to leave a urine.  I informed her that per [facility] policy if she is not within the window then she will need to do a pregnancy test.  She stated she will call Yissel Chandler.

## 2020-10-13 NOTE — TELEPHONE ENCOUNTER
Spoke to bart and kate, they are fine with this they need to come after they get the sample. And guardian was notified.  Haydee Cross MA 10/13/2020

## 2020-10-13 NOTE — TELEPHONE ENCOUNTER
Reason for Call:  Other     Detailed comments: Lucretia, Gemini's guardian, calls regarding Gemini needing a UA done prior to her Depo shot due to falling behind.  Lucretia states Gemini is autistic and getting a urine sample from her is not easy. Lucretia states in the past if this were to happen that a pregnancy test was not required for her due to her condition.  Lucretia states she will attempt to get one from her at home if it is needed but wants to know that if she brings in this sample from home that Gemini would be able to get her Depo shot that day as well.  Lucretia states Gemini is NOT sexually active, autistic with some mental retardation as well.  Her homemones are out of control without her Depo.  And lastly, Lucretia states the reason they are a little behind on this is because of [facility] booked float schedule.      Asking if another provider could address this issue in [facility] absence as Gemini is needing her Depo shot.    Phone Number Patient can be reached at: [phone]    Best Time: any    Can we leave a detailed message on this number? YES    Call taken on 10/13/2020 at 11:35 AM by Adelina Munguia

## 2020-10-14 ENCOUNTER — ALLIED HEALTH/NURSE VISIT (OUTPATIENT)
Dept: FAMILY MEDICINE | Facility: CLINIC | Age: 35
End: 2020-10-14
Payer: MEDICARE

## 2020-10-14 DIAGNOSIS — Z30.42 ENCOUNTER FOR SURVEILLANCE OF INJECTABLE CONTRACEPTIVE: ICD-10-CM

## 2020-10-14 LAB — HCG UR QL: NEGATIVE

## 2020-10-14 PROCEDURE — 96372 THER/PROPH/DIAG INJ SC/IM: CPT

## 2020-10-14 PROCEDURE — 81025 URINE PREGNANCY TEST: CPT | Performed by: PHYSICIAN ASSISTANT

## 2020-10-14 RX ADMIN — MEDROXYPROGESTERONE ACETATE 150 MG: 150 INJECTION, SUSPENSION INTRAMUSCULAR at 10:33

## 2020-10-14 NOTE — NURSING NOTE
Clinic Administered Medication Documentation      Depo Provera Documentation    URINE HCG: negative    Depo-Provera Standing Order inclusion/exclusion criteria reviewed.   Patient meets: inclusion criteria     BP: Data Unavailable  LAST PAP/EXAM: No results found for: PAP    Prior to injection, verified patient identity using patient's name and date of birth. Medication was administered. Please see MAR and medication order for additional information.     Was entire vial of medication used? Yes  Vial/Syringe: Single dose vial  Expiration Date:  3/2022    Patient instructed to remain in clinic for 15 minutes.  NEXT INJECTION DUE: 12/30/20 - 1/13/21  LIO/MA

## 2020-11-03 DIAGNOSIS — E56.9 VITAMIN DEFICIENCY: ICD-10-CM

## 2020-11-03 DIAGNOSIS — F79 INTELLECTUAL DISABILITY: ICD-10-CM

## 2020-11-03 DIAGNOSIS — F84.0 ACTIVE AUTISTIC DISORDER: ICD-10-CM

## 2020-11-04 RX ORDER — MULTIVITAMIN WITH FOLIC ACID 400 MCG
TABLET ORAL
Qty: 28 TABLET | Refills: 3 | Status: SHIPPED | OUTPATIENT
Start: 2020-11-04

## 2020-11-04 RX ORDER — ASCORBIC ACID 500 MG
TABLET ORAL
Qty: 28 TABLET | Refills: 3 | Status: SHIPPED | OUTPATIENT
Start: 2020-11-04

## 2020-11-04 NOTE — TELEPHONE ENCOUNTER
Routing refill request to provider for review/approval because:  Drug not on the [facility] refill protocol (Vitamin C)    MIMI GomezN, RN  [facility]

## 2020-11-04 NOTE — TELEPHONE ENCOUNTER
Prescription approved per [facility] Refill Protocol (Tab-A-Katherin).    Yi Juárez, BSN, RN  [facility]

## 2020-11-07 ENCOUNTER — HEALTH MAINTENANCE LETTER (OUTPATIENT)
Age: 35
End: 2020-11-07

## 2020-12-30 ENCOUNTER — ALLIED HEALTH/NURSE VISIT (OUTPATIENT)
Dept: FAMILY MEDICINE | Facility: CLINIC | Age: 35
End: 2020-12-30
Payer: MEDICARE

## 2020-12-30 DIAGNOSIS — Z30.42 ENCOUNTER FOR SURVEILLANCE OF INJECTABLE CONTRACEPTIVE: Primary | ICD-10-CM

## 2020-12-30 PROCEDURE — 96372 THER/PROPH/DIAG INJ SC/IM: CPT

## 2020-12-30 PROCEDURE — 99207 PR NO CHARGE NURSE ONLY: CPT

## 2020-12-30 RX ADMIN — MEDROXYPROGESTERONE ACETATE 150 MG: 150 INJECTION, SUSPENSION INTRAMUSCULAR at 15:14

## 2020-12-30 NOTE — PROGRESS NOTES
Clinic Administered Medication Documentation      Depo Provera Documentation    URINE HCG: not indicated    Depo-Provera Standing Order inclusion/exclusion criteria reviewed.   Patient meets: inclusion criteria     BP: Data Unavailable  LAST PAP/EXAM: No results found for: PAP    Prior to injection, verified patient identity using patient's name and date of birth. Medication was administered. Please see MAR and medication order for additional information.     Was entire vial of medication used? Yes  Vial/Syringe: Single dose vial  Expiration Date:  06/2022    Patient instructed to remain in clinic for 15 minutes and report any adverse reaction to staff immediately .  NEXT INJECTION DUE: 3/17/21 - 3/31/21    Анна Tracey CMA ([facility]AMA)

## 2020-12-31 ENCOUNTER — TELEPHONE (OUTPATIENT)
Dept: FAMILY MEDICINE | Facility: CLINIC | Age: 35
End: 2020-12-31

## 2020-12-31 DIAGNOSIS — R30.0 DYSURIA: Primary | ICD-10-CM

## 2020-12-31 NOTE — TELEPHONE ENCOUNTER
Reason for Call: Request for an order or referral:    Order or referral being requested: UA     Date needed: as soon as possible    Has the patient been seen by the PCP for this problem? YES    Additional comments: UA needed, requested by guardian    Phone number Patient can be reached at:  [phone]     Best Time:  any    Can we leave a detailed message on this number?  YES    Call taken on 12/31/2020 at 12:49 PM by Jade Bonilla

## 2020-12-31 NOTE — TELEPHONE ENCOUNTER
Left message for Lucretia to return call to [phone]. Please relay that patient will need to leave a urine sample and when that is resulted Yissel will advise on antibiotic. Order is placed and patient should make a lab appointment asap.  Yissel Frey, CMA

## 2020-12-31 NOTE — TELEPHONE ENCOUNTER
I spoke to Lucretia, her guardian, states she has discolored urine, less output, decreased appetite, more rocking.  Using [facility], [address]. Please advise .................Eduardo Mancuso LPN,   December 31, 2020,      1:20 PM,   [facility]

## 2021-03-08 ENCOUNTER — TELEPHONE (OUTPATIENT)
Dept: FAMILY MEDICINE | Facility: CLINIC | Age: 36
End: 2021-03-08

## 2021-03-08 NOTE — TELEPHONE ENCOUNTER
Patient's guardian is calling to schedule an acute office visit for patient.  Scheduled office visit.    Holly Peña RN

## 2021-03-09 ENCOUNTER — OFFICE VISIT (OUTPATIENT)
Dept: FAMILY MEDICINE | Facility: CLINIC | Age: 36
End: 2021-03-09
Payer: MEDICARE

## 2021-03-09 VITALS
HEART RATE: 109 BPM | TEMPERATURE: 97.8 F | BODY MASS INDEX: 27.03 KG/M2 | OXYGEN SATURATION: 97 % | WEIGHT: 132.3 LBS | RESPIRATION RATE: 16 BRPM

## 2021-03-09 DIAGNOSIS — R39.9 UTI SYMPTOMS: Primary | ICD-10-CM

## 2021-03-09 DIAGNOSIS — N30.00 ACUTE CYSTITIS WITHOUT HEMATURIA: ICD-10-CM

## 2021-03-09 LAB
ALBUMIN UR-MCNC: NEGATIVE MG/DL
APPEARANCE UR: ABNORMAL
BACTERIA #/AREA URNS HPF: ABNORMAL /HPF
BILIRUB UR QL STRIP: NEGATIVE
COLOR UR AUTO: YELLOW
GLUCOSE UR STRIP-MCNC: NEGATIVE MG/DL
HGB UR QL STRIP: NEGATIVE
KETONES UR STRIP-MCNC: NEGATIVE MG/DL
LEUKOCYTE ESTERASE UR QL STRIP: ABNORMAL
MUCOUS THREADS #/AREA URNS LPF: PRESENT /LPF
NITRATE UR QL: NEGATIVE
PH UR STRIP: 5 PH (ref 5–7)
RBC #/AREA URNS AUTO: 1 /HPF (ref 0–2)
SOURCE: ABNORMAL
SP GR UR STRIP: 1.01 (ref 1–1.03)
SQUAMOUS #/AREA URNS AUTO: 4 /HPF (ref 0–1)
UROBILINOGEN UR STRIP-MCNC: 0 MG/DL (ref 0–2)
WBC #/AREA URNS AUTO: 40 /HPF (ref 0–5)

## 2021-03-09 PROCEDURE — 81001 URINALYSIS AUTO W/SCOPE: CPT | Performed by: PHYSICIAN ASSISTANT

## 2021-03-09 PROCEDURE — 87086 URINE CULTURE/COLONY COUNT: CPT | Performed by: PHYSICIAN ASSISTANT

## 2021-03-09 PROCEDURE — 99213 OFFICE O/P EST LOW 20 MIN: CPT | Performed by: PHYSICIAN ASSISTANT

## 2021-03-09 RX ORDER — CEPHALEXIN 500 MG/1
500 CAPSULE ORAL 2 TIMES DAILY
Qty: 14 CAPSULE | Refills: 0 | Status: SHIPPED | OUTPATIENT
Start: 2021-03-09 | End: 2021-03-16

## 2021-03-09 NOTE — PATIENT INSTRUCTIONS
Take full course of antibiotics- keflex twice daily for 7 days.  Urine culture pending, we will call you only if culture shows resistance and change of antibiotic is required or if no growth to stop antibiotics and to follow-up with your primary care provider.  Drink plenty of fluids. Limit caffeine and alcohol as these are bladder irritants.  May take tylenol or ibuprofen as needed for discomfort.   If you develop any vomiting, high fevers or lower back pain, these can be signs of a kidney infection and you should be seen in urgent care or in the ER.  Prevention of future infections by drinking cranberry juice, urination after intercourse and wiping from front to back after using the toilet.  Please follow up with primary care provider if symptoms return, if you're not improving, worsening or new symptoms or for any adverse reactions to medications.

## 2021-03-09 NOTE — PROGRESS NOTES
Assessment & Plan     UTI symptoms  - UA reflex to Microscopic ([facility]); Future  - Urine Culture Aerobic Bacterial; Future  - Urine Culture Aerobic Bacterial  - UA reflex to Microscopic ([facility])    Acute cystitis without hematuria  - cephALEXin (KEFLEX) 500 MG capsule; Take 1 capsule (500 mg) by mouth 2 times daily for 7 days    UA shows moderate leuks.  Will start Keflex twice daily for 7 days today.  Adjust plan as needed based on urine culture results.  Advised that she keep the appointment with behavioral specialist to discuss ongoing behavioral issues.     20 minutes spent on the date of the encounter doing chart review, patient visit and documentation     Return in about 3 days (around 3/12/2021) for Recheck with primary care provider if not improved.    KAMILA Cain [facility]    Alexia   Gemini is a 35 year old who presents for the following health issues     HPI       Genitourinary - Female      Concern - pt having behavior issuesso her behavior Dr wants her to get checked today for a UTI and ear to rule that out first  Onset: x 2 months  Description: patient has been pacing excessively, violent behavior, curran her urine and rubbing her ears  Intensity: moderate  Progression of Symptoms:  worsening  Accompanying Signs & Symptoms: none  Previous history of similar problem: none  Precipitating factors:        Worsened by: none  Alleviating factors:        Improved by: none   Therapies tried and outcome: None    Gemini presents today with her caregiver for evaluation of a 2-month history of increasingly aggressive behavior.  Her caregiver notes that she normally paces but this pacing has been more aggressive than usual.  She does have a history of frequent urinary tract infections however in the past with urinary tract infection she has wanted to stay in bed and not eat and has been more still than usual.  She is adjusting her  medications at this time and wonders if this is contributing to the agitation.  She has been taken off her Seroquel and Ativan and reduce the dose of her Risperdal.  Her caregiver also notes that she has been pulling on her hair quite a bit recently because she was doing this, the caregiver and is hair to try to reduce this behavior but now she is pulling on her ears instead.    Review of Systems   ROS negative except as stated above.        Objective    Pulse 109   Temp 97.8  F (36.6  C) (Temporal)   Resp 16   Wt 60 kg (132 lb 4.8 oz)   SpO2 97%   BMI 27.03 kg/m    Body mass index is 27.03 kg/m .  Physical Exam   GENERAL: healthy, alert and constantly pacing in exam room  HEENT: No pain with palpation over frontal or maxillary sinuses. Eyes PEERL, EOM intact. Conjunctiva clear. Bilateral external ear canals normal and TMs grey with a good cone of light. No effusion, drainage or edema. Normal landmarks visible. Nasal turbinates non edematous and non erythematous without discharge. Posterior pharynx non erythematous, non edematous and without exudate. Oral mucosa pink and moist.  RESP: lungs clear to auscultation - no rales, rhonchi or wheezes  CV: regular rate and rhythm, normal S1 S2  MS: no gross musculoskeletal defects noted, no edema  BACK: no CVA tenderness, no paralumbar tenderness    Results for orders placed or performed in visit on 03/09/21   UA reflex to Microscopic ([facility]; [facility])     Status: Abnormal   Result Value Ref Range    Color Urine Yellow     Appearance Urine Slightly Cloudy     Glucose Urine Negative NEG^Negative mg/dL    Bilirubin Urine Negative NEG^Negative    Ketones Urine Negative NEG^Negative mg/dL    Specific Gravity Urine 1.012 1.003 - 1.035    Blood Urine Negative NEG^Negative    pH Urine 5.0 5.0 - 7.0 pH    Protein Albumin Urine Negative NEG^Negative mg/dL    Urobilinogen mg/dL 0.0 0.0 - 2.0 mg/dL    Nitrite Urine Negative NEG^Negative    Leukocyte Esterase Urine  Moderate (A) NEG^Negative    Source Midstream Urine     RBC Urine 1 0 - 2 /HPF    WBC Urine 40 (H) 0 - 5 /HPF    Bacteria Urine Few (A) NEG^Negative /HPF    Squamous Epithelial /HPF Urine 4 (H) 0 - 1 /HPF    Mucous Urine Present (A) NEG^Negative /LPF

## 2021-03-10 LAB
BACTERIA SPEC CULT: NORMAL
Lab: NORMAL
SPECIMEN SOURCE: NORMAL

## 2021-03-11 NOTE — RESULT ENCOUNTER NOTE
Urine culture remains negative with mixed gram positive growth, likely contamination.   If she is feeling better, finish antibiotic.  If she is not feeling better, stop antibiotic and follow up with primary care provider.    Peggy Salazar PA-C  [facility]

## 2021-03-17 ENCOUNTER — ALLIED HEALTH/NURSE VISIT (OUTPATIENT)
Dept: FAMILY MEDICINE | Facility: CLINIC | Age: 36
End: 2021-03-17
Payer: MEDICARE

## 2021-03-17 DIAGNOSIS — Z30.42 ENCOUNTER FOR SURVEILLANCE OF INJECTABLE CONTRACEPTIVE: Primary | ICD-10-CM

## 2021-03-17 PROCEDURE — 99207 PR NO CHARGE NURSE ONLY: CPT

## 2021-03-17 PROCEDURE — 96372 THER/PROPH/DIAG INJ SC/IM: CPT

## 2021-03-17 RX ADMIN — MEDROXYPROGESTERONE ACETATE 150 MG: 150 INJECTION, SUSPENSION INTRAMUSCULAR at 10:01

## 2021-03-17 NOTE — PROGRESS NOTES
Clinic Administered Medication Documentation      Depo Provera Documentation    URINE HCG: not indicated    Depo-Provera Standing Order inclusion/exclusion criteria reviewed.   Patient meets: inclusion criteria     BP: Data Unavailable  LAST PAP/EXAM: No results found for: PAP    Prior to injection, verified patient identity using patient's name and date of birth. Medication was administered. Please see MAR and medication order for additional information.     Was entire vial of medication used? Yes  Vial/Syringe: Single dose vial  Expiration Date:  09/30/2022    Patient instructed to remain in clinic for 15 minutes and stay in clinic after the injection but patient declined.  NEXT INJECTION DUE: 6/2/21 - 6/16/21

## 2021-04-22 ENCOUNTER — OFFICE VISIT (OUTPATIENT)
Dept: FAMILY MEDICINE | Facility: CLINIC | Age: 36
End: 2021-04-22
Payer: MEDICARE

## 2021-04-22 VITALS
RESPIRATION RATE: 16 BRPM | SYSTOLIC BLOOD PRESSURE: 104 MMHG | HEIGHT: 61 IN | OXYGEN SATURATION: 98 % | DIASTOLIC BLOOD PRESSURE: 62 MMHG | HEART RATE: 98 BPM | BODY MASS INDEX: 25.39 KG/M2 | WEIGHT: 134.5 LBS | TEMPERATURE: 98 F

## 2021-04-22 DIAGNOSIS — B35.1 ONYCHOMYCOSIS: Primary | ICD-10-CM

## 2021-04-22 LAB
ALBUMIN SERPL-MCNC: 3.6 G/DL (ref 3.4–5)
ALP SERPL-CCNC: 49 U/L (ref 40–150)
ALT SERPL W P-5'-P-CCNC: 30 U/L (ref 0–50)
AST SERPL W P-5'-P-CCNC: 12 U/L (ref 0–45)
BILIRUB DIRECT SERPL-MCNC: 0.2 MG/DL (ref 0–0.2)
BILIRUB SERPL-MCNC: 0.5 MG/DL (ref 0.2–1.3)
PROT SERPL-MCNC: 7.3 G/DL (ref 6.8–8.8)

## 2021-04-22 PROCEDURE — 80076 HEPATIC FUNCTION PANEL: CPT | Performed by: FAMILY MEDICINE

## 2021-04-22 PROCEDURE — 36415 COLL VENOUS BLD VENIPUNCTURE: CPT | Performed by: FAMILY MEDICINE

## 2021-04-22 PROCEDURE — 99213 OFFICE O/P EST LOW 20 MIN: CPT | Performed by: FAMILY MEDICINE

## 2021-04-22 RX ORDER — TERBINAFINE HYDROCHLORIDE 250 MG/1
250 TABLET ORAL DAILY
Qty: 42 TABLET | Refills: 0 | Status: SHIPPED | OUTPATIENT
Start: 2021-04-22 | End: 2021-05-13

## 2021-04-22 ASSESSMENT — PAIN SCALES - GENERAL: PAINLEVEL: NO PAIN (0)

## 2021-04-22 ASSESSMENT — MIFFLIN-ST. JEOR: SCORE: 1229.53
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
## 2021-05-13 DIAGNOSIS — B35.1 ONYCHOMYCOSIS: ICD-10-CM

## 2021-05-13 RX ORDER — TERBINAFINE HYDROCHLORIDE 250 MG/1
TABLET ORAL
Qty: 29 TABLET | Refills: 11 | Status: SHIPPED | OUTPATIENT
Start: 2021-05-13 | End: 2022-01-07

## 2021-05-13 NOTE — TELEPHONE ENCOUNTER
Terbinafine        Last Written Prescription Date:  4/22/2021  Last Fill Quantity: 42,   # refills: 0  Last Office Visit: 4/22/2021  Future Office visit:       Routing refill request to provider for review/approval because:  Drug not on the [facility], P or [facility] refill protocol or controlled substance

## 2021-06-01 ENCOUNTER — ALLIED HEALTH/NURSE VISIT (OUTPATIENT)
Dept: FAMILY MEDICINE | Facility: CLINIC | Age: 36
End: 2021-06-01
Payer: MEDICARE

## 2021-06-01 DIAGNOSIS — Z30.42 ENCOUNTER FOR SURVEILLANCE OF INJECTABLE CONTRACEPTIVE: Primary | ICD-10-CM

## 2021-06-01 PROCEDURE — 99207 PR NO CHARGE NURSE ONLY: CPT

## 2021-06-01 PROCEDURE — 96372 THER/PROPH/DIAG INJ SC/IM: CPT

## 2021-06-01 RX ADMIN — MEDROXYPROGESTERONE ACETATE 150 MG: 150 INJECTION, SUSPENSION INTRAMUSCULAR at 11:37

## 2021-06-01 NOTE — PROGRESS NOTES
Clinic Administered Medication Documentation      Depo Provera Documentation    URINE HCG: not indicated    Depo-Provera Standing Order inclusion/exclusion criteria reviewed.   Patient meets: inclusion criteria     BP: Data Unavailable  LAST PAP/EXAM: No results found for: PAP    Prior to injection, verified patient identity using patient's name and date of birth. Medication was administered. Please see MAR and medication order for additional information.     Was entire vial of medication used? Yes  Vial/Syringe: Single dose vial  Expiration Date:  09/22    Patient instructed to remain in clinic for 15 minutes and report any adverse reaction to staff immediately .  NEXT INJECTION DUE: 8/17/21 - 8/31/21    *Per huddle with Dr. Corey/Dr. Kristian Camarena to give 1 day early as patient was present in clinic.   Anastasia Cordova CMA on 6/1/2021 at 11:37 AM

## 2021-09-05 ENCOUNTER — HEALTH MAINTENANCE LETTER (OUTPATIENT)
Age: 36
End: 2021-09-05

## 2021-09-28 ENCOUNTER — OFFICE VISIT (OUTPATIENT)
Dept: FAMILY MEDICINE | Facility: CLINIC | Age: 36
End: 2021-09-28
Payer: MEDICARE

## 2021-09-28 VITALS
BODY MASS INDEX: 28.04 KG/M2 | HEART RATE: 80 BPM | TEMPERATURE: 96.7 F | RESPIRATION RATE: 20 BRPM | WEIGHT: 146 LBS | DIASTOLIC BLOOD PRESSURE: 78 MMHG | SYSTOLIC BLOOD PRESSURE: 116 MMHG

## 2021-09-28 DIAGNOSIS — N39.0 FREQUENT UTI: ICD-10-CM

## 2021-09-28 DIAGNOSIS — Z30.09 BIRTH CONTROL COUNSELING: Primary | ICD-10-CM

## 2021-09-28 DIAGNOSIS — Q63.1 HORSESHOE KIDNEY: ICD-10-CM

## 2021-09-28 PROCEDURE — 99213 OFFICE O/P EST LOW 20 MIN: CPT | Performed by: STUDENT IN AN ORGANIZED HEALTH CARE EDUCATION/TRAINING PROGRAM

## 2021-09-28 NOTE — NURSING NOTE
Health Maintenance Due   Topic Date Due     ANNUAL REVIEW OF HM ORDERS  Never done     COVID-19 Vaccine (1) Never done     HEPATITIS C SCREENING  Never done     MEDICARE ANNUAL WELLNESS VISIT  08/16/2020     INFLUENZA VACCINE (1) 09/01/2021     Regina GEORGE LPN

## 2021-09-28 NOTE — PROGRESS NOTES
"    Assessment & Plan     Birth control counseling  Difficult to control periods in the past. Not sexually active. Has been over 1 month overdue for next shot.  - HCG Qual, Urine ([id number])  - HCG Qual, Urine ([id number])    [place] kidney    Frequent UTI  Gemini's guardian and I did discuss the need to treat only with symptoms. It has been very difficult for her to note when Gemini has had symptoms in the past. She has been very tolerant to pain and burning her hands without showing signs of pain previously. It also has been difficult to obtain urine samples in the past and given that she needs one today prior to Depo they would like this collected which is reasonable. We did discuss the risk and benefits as well as the need to treat going forward.  - UA macro with reflex to Microscopic and Culture - [facility] Collect    They will get debrox drops at home for her ear wax and see how things go.     BMI:   Estimated body mass index is 28.04 kg/m  as calculated from the following:    Height as of 4/22/21: 1.537 m (5' 0.5\").    Weight as of this encounter: 66.2 kg (146 lb).       Return if symptoms worsen or fail to improve.    Ahmet Leslie MD  [facility]   Gemini is a 35 year old who presents for the following health issues  accompanied by her guardian:    HPI     Concern - check ears  Onset:   Description: check ears  Intensity: mild  Progression of Symptoms:  same  Accompanying Signs & Symptoms: rubbing her ears  Previous history of similar problem: YES  Precipitating factors:        Worsened by: nothing  Alleviating factors:        Improved by: nothing  Therapies tried and outcome:  none     Also notes some worsening itching of her ear on the left.    Review of Systems   Constitutional, HEENT, cardiovascular, pulmonary, gi and gu systems are negative, except as otherwise noted that guardian has noticed. .      Objective    /78 (BP Location: Right arm, Patient Position: Chair, " Cuff Size: Adult Large)   Pulse 80   Temp (!) 96.7  F (35.9  C) (Temporal)   Resp 20   Wt 66.2 kg (146 lb)   BMI 28.04 kg/m    Body mass index is 28.04 kg/m .  Physical Exam   GENERAL: healthy, alert and no distress  EYES: Eyes grossly normal to inspection, PERRL and conjunctivae and sclerae normal  HENT: ear canals, Right TM normal and left TM impacted by cerumen nose and mouth without ulcers or lesions  NECK: no adenopathy, no asymmetry, masses, or scars and thyroid normal to palpation  RESP: Breathing comfortably on room air  MS: no gross musculoskeletal defects noted, no edema  SKIN: no suspicious lesions or rashes

## 2021-09-30 ENCOUNTER — TELEPHONE (OUTPATIENT)
Dept: FAMILY MEDICINE | Facility: CLINIC | Age: 36
End: 2021-09-30

## 2021-09-30 DIAGNOSIS — Z30.42 ENCOUNTER FOR SURVEILLANCE OF INJECTABLE CONTRACEPTIVE: ICD-10-CM

## 2021-09-30 DIAGNOSIS — Z30.09 BIRTH CONTROL COUNSELING: Primary | ICD-10-CM

## 2021-09-30 NOTE — TELEPHONE ENCOUNTER
Reason for Call: Request for an order or referral: Needs Order    Order or referral being requested: DEPO shot and urine sample cup    Date needed: ASAP    Has the patient been seen by the PCP for this problem? Yes    Additional comments: Needs order for DEPO shot ASAP, pt went to drop off urine sample and do depo shot and was told that no order was sent.    Phone number Patient can be reached at:  [phone]    Best Time:  Anytime    Can we leave a detailed message on this number? Yes    Call taken on 9/30/2021 at 4:33 PM by Aditya Cox

## 2021-10-01 RX ORDER — MEDROXYPROGESTERONE ACETATE 150 MG/ML
150 INJECTION, SUSPENSION INTRAMUSCULAR
Status: ACTIVE | OUTPATIENT
Start: 2021-10-01 | End: 2022-09-26

## 2021-10-01 NOTE — TELEPHONE ENCOUNTER
Left message for guardian with the details of message below, to call back or schedule depo.   Alyssa Hope MA on 10/1/2021 at 3:53 PM

## 2021-10-01 NOTE — TELEPHONE ENCOUNTER
Tried to reach patient's guardian, unable to reach her. Left message for her to call the clinic back. If she calls back, please let her know that an order has been placed. She can schedule a lab only for urine and then placed on the float schedule where there is an opening for a depo injection. MyChart message sent to patient as well.    Thank you.    Amrita Posada CMA

## 2021-10-01 NOTE — TELEPHONE ENCOUNTER
Given the difficulty I am ok for her to get the shot without pregnancy test if she is not sexually active. Thanks.

## 2021-10-01 NOTE — TELEPHONE ENCOUNTER
Order placed for depo injection. It looks like order for urine is already in from our previous visit. Thanks.

## 2021-10-01 NOTE — TELEPHONE ENCOUNTER
Relayed message and Guardian Lucretia is wondering instead of the urine because it's so hard for patient to collect. Wondering if a blood test could be ordered instead

## 2021-10-05 ENCOUNTER — ALLIED HEALTH/NURSE VISIT (OUTPATIENT)
Dept: FAMILY MEDICINE | Facility: CLINIC | Age: 36
End: 2021-10-05
Payer: MEDICARE

## 2021-10-05 DIAGNOSIS — Z30.42 ENCOUNTER FOR SURVEILLANCE OF INJECTABLE CONTRACEPTIVE: Primary | ICD-10-CM

## 2021-10-05 PROCEDURE — 99207 PR NO CHARGE NURSE ONLY: CPT

## 2021-10-05 PROCEDURE — 96372 THER/PROPH/DIAG INJ SC/IM: CPT | Performed by: STUDENT IN AN ORGANIZED HEALTH CARE EDUCATION/TRAINING PROGRAM

## 2021-10-05 RX ADMIN — MEDROXYPROGESTERONE ACETATE 150 MG: 150 INJECTION, SUSPENSION INTRAMUSCULAR at 15:37

## 2021-10-05 NOTE — PROGRESS NOTES
Clinic Administered Medication Documentation    Administrations This Visit     medroxyPROGESTERone (DEPO-PROVERA) injection 150 mg     Admin Date  10/05/2021 Action  Given Dose  150 mg Route  Intramuscular Site  Left Deltoid Administered By  Анна Tracey CMA    Ordering Provider: Ahmet Leslie MD    Patient Supplied?: No                  Depo Provera Documentation    URINE HCG: not indicated. Per orders from Ahmet Leslie MD, no pregnancy test needed.     Depo-Provera Standing Order inclusion/exclusion criteria reviewed.   Patient meets: inclusion criteria     BP: Data Unavailable  LAST PAP/EXAM: No results found for: PAP    Prior to injection, verified patient identity using patient's name and date of birth. Medication was administered. Please see MAR and medication order for additional information.     Was entire vial of medication used? Yes  Vial/Syringe: Single dose vial  Expiration Date:  12/2022    Patient instructed to remain in clinic for 15 minutes and report any adverse reaction to staff immediately .  NEXT INJECTION DUE: 12/21/21 - 1/4/22    Анна Tracey CMA ([facility]AMA)

## 2021-10-13 ENCOUNTER — OFFICE VISIT (OUTPATIENT)
Dept: INTERNAL MEDICINE | Facility: CLINIC | Age: 36
End: 2021-10-13
Payer: MEDICARE

## 2021-10-13 VITALS
WEIGHT: 146.1 LBS | SYSTOLIC BLOOD PRESSURE: 114 MMHG | DIASTOLIC BLOOD PRESSURE: 72 MMHG | TEMPERATURE: 98.4 F | OXYGEN SATURATION: 99 % | BODY MASS INDEX: 28.06 KG/M2 | RESPIRATION RATE: 18 BRPM | HEART RATE: 88 BPM

## 2021-10-13 DIAGNOSIS — K21.00 GASTROESOPHAGEAL REFLUX DISEASE WITH ESOPHAGITIS WITHOUT HEMORRHAGE: Primary | ICD-10-CM

## 2021-10-13 PROCEDURE — 99214 OFFICE O/P EST MOD 30 MIN: CPT | Performed by: INTERNAL MEDICINE

## 2021-10-13 RX ORDER — QUETIAPINE FUMARATE 50 MG/1
50 TABLET, FILM COATED ORAL
COMMUNITY
Start: 2021-09-22

## 2021-10-13 RX ORDER — OMEPRAZOLE 40 MG/1
40 CAPSULE, DELAYED RELEASE ORAL DAILY
Qty: 30 CAPSULE | Refills: 0 | Status: SHIPPED | OUTPATIENT
Start: 2021-10-13 | End: 2021-11-02

## 2021-10-13 NOTE — PROGRESS NOTES
Subjective   Gemini is a 35 year old with a history of autism and mental retardation who presents for the following health issues  accompanied by her guardian Lucretia: Pt is non-verbal. Hx obtained through pt guardian.   Guardian reports increased whining from the pt over the last week.   Episodes typically occur while pt is going for walks.   The pt points to her chest when asked what is wrong.   She has not taken anything for her pain.   According to caregiver, she has no hx of heartburn, no recent diet changes, cough, fevers, chills, or SOB.   Further hx difficult to obtain due to nonverbal pt.         Chief Complaint   Patient presents with     Whining     has been whining more often x1 week, when asked why she points to chest         ROS unable to be obtained due to pt nonverbal condition      Objective    /72   Pulse 88   Temp 98.4  F (36.9  C) (Temporal)   Resp 18   Wt 66.3 kg (146 lb 1.6 oz)   SpO2 99%   BMI 28.06 kg/m    Body mass index is 28.06 kg/m .  Physical Exam   Constitutional: Patient is sitting comfortably in chair with her caregiver at her side.  She appears to be in no acute distress.  She is nonverbal but occasionally answers questions with a nod of the head or signs.  Chest: Patient pushes hand away while palpating anterior chest wall.  It is unclear if she is doing this because there is pain, or because she is uncomfortable with someone she does not know touching her chest.  She does not wince or give any other indication that it is painful.  She also pushes the stethoscope away occasionally while listening to heart and lung sounds.  Cardiovascular: Normal rate and regular rhythm. S1 and S2 normal.  No murmurs rubs or gallops.  Pulmonary: Pulmonary effort is normal.  Breath sounds normal.  No wheezing rhonchi or rales.    Further physical exam unable to be obtained due to patient cooperation.        Vital Signs:   /72   Pulse 88   Temp 98.4  F (36.9  C) (Temporal)    Resp 18   Wt 66.3 kg (146 lb 1.6 oz)   SpO2 99%   BMI 28.06 kg/m             Decision Making    Problem and Complexity     1. Gastroesophageal reflux disease with esophagitis without hemorrhage  Difficult to ascertain a detailed history of her condition due to pt nonverbal state. Because her symptoms are worse with walking, and the high incidence of GERD in the general population, I believe it is reasonable to treat her symptoms as if they are due to GERD and trial omeprazole for a month.  If symptoms do not improve and patient is still whining while pointing at her chest in 1 month return to clinic for further work-up.  - omeprazole (PRILOSEC) 40 MG DR capsule; Take 1 capsule (40 mg) by mouth daily  Dispense: 30 capsule; Refill: 0                                    FOLLOW UP   I have asked the patient to make an appointment for followup with me in 1 month if symptoms do not seem to improve on omeprazole.          I have carefully explained the diagnosis and treatment options to the patient.  The patient has displayed an understanding of the above, and all subsequent questions were answered.      DO MARIUM Rosa    Portions of this note were produced using [facility]  Although every attempt at real-time proof reading has been made, occasional grammar/syntax errors may have been missed.    This patient has been interviewed, examined, diagnosed, and informed of the above by me personally.  Medical records and available pertinent information has been reviewed by me personally.  All decisions and discussion have been between myself and the patient/family.  This was done in the presence of Alessandro Olsen  , who acted as a medical scribe and recorded the events above.  No diagnosis or decision making was made by the above-mentioned scribe.  The patient, and or his/her ensurors will not be billed for the presence or actions of this scribe.  The information recorded by the scribe has been reviewed by me  and found to be accurate.

## 2021-10-20 DIAGNOSIS — K21.00 GASTROESOPHAGEAL REFLUX DISEASE WITH ESOPHAGITIS WITHOUT HEMORRHAGE: ICD-10-CM

## 2021-10-22 RX ORDER — OMEPRAZOLE 40 MG/1
CAPSULE, DELAYED RELEASE ORAL
Qty: 22 CAPSULE | Refills: 0 | OUTPATIENT
Start: 2021-10-22

## 2021-11-02 DIAGNOSIS — K59.00 CONSTIPATION, UNSPECIFIED CONSTIPATION TYPE: ICD-10-CM

## 2021-11-03 NOTE — TELEPHONE ENCOUNTER
Patient has been seen in the past 30 days., 10/13/21 Dr. Townsend  Routing to PCP to advise.  SATHISH Banda

## 2021-11-04 RX ORDER — POLYETHYLENE GLYCOL 3350 17 G/17G
POWDER, FOR SOLUTION ORAL
Qty: 510 G | Refills: 0 | Status: SHIPPED | OUTPATIENT
Start: 2021-11-04

## 2021-12-19 ENCOUNTER — NURSE TRIAGE (OUTPATIENT)
Dept: NURSING | Facility: CLINIC | Age: 36
End: 2021-12-19
Payer: MEDICARE

## 2021-12-19 NOTE — TELEPHONE ENCOUNTER
TRIAGE CALL     Lucretia GUARDIAN calling   Pt is with her: history of autism and mental retardation Pt is non-verbal.  Reports:  [facility]  Miralax once day  Stomach is HARD - unclear if she is in pain  NO vomiting  NO nausea  NO fever    Pt escobar not have a PCP     Symptoms/concern started full BM was about Thursday AM     Pain Unclear/10  But she is in disconfort  Location and description of pain      Medications taken today Just Miralax     Patient denies fever, nausea, vomiting, or diarrhea.   and last BM was very little this 1/4 cup and runny     Patient/Child able to continues having wet and soiled diapers very little.  Discomfort does keep patient from eating    [facility] since they do not have accomotadions for her. Pt gets upset while waiting. Care advise given, patients questions were answered.   She will try SITZ bath and enema and if that escobar snot work she will drive her to the ER    Reminded we will be here 24/7 and can call back and ask to speak with a nurse.   Inés Canales RN Nurse Triage Advisor 10:58 AM 12/19/2021    Reason for Disposition    [1] Rectal pain or fullness from fecal impaction (rectum full of stool) AND [2] NOT better after SITZ bath, suppository or enema    Protocols used: CONSTIPATION-A-AH

## 2021-12-26 ENCOUNTER — HEALTH MAINTENANCE LETTER (OUTPATIENT)
Age: 36
End: 2021-12-26

## 2022-01-03 ENCOUNTER — ALLIED HEALTH/NURSE VISIT (OUTPATIENT)
Dept: FAMILY MEDICINE | Facility: CLINIC | Age: 37
End: 2022-01-03
Payer: MEDICARE

## 2022-01-03 DIAGNOSIS — Z30.09 BIRTH CONTROL COUNSELING: Primary | ICD-10-CM

## 2022-01-03 PROCEDURE — 99207 PR NO CHARGE NURSE ONLY: CPT

## 2022-01-03 NOTE — PROGRESS NOTES
Clinic Administered Medication Documentation          Depo Provera Documentation    URINE HCG: not indicated    Depo-Provera Standing Order inclusion/exclusion criteria reviewed.   Patient meets: inclusion criteria     BP: Data Unavailable  LAST PAP/EXAM: No results found for: PAP    Prior to injection, verified patient identity using patient's name and date of birth. Medication was administered. Please see MAR and medication order for additional information.     Was entire vial of medication used? Yes  Vial/Syringe: Single dose vial  Expiration Date:  04/2023    Patient instructed to remain in clinic for 15 minutes and report any adverse reaction to staff immediately .  NEXT INJECTION DUE: 3/21/22 - 4/4/22

## 2022-01-07 ENCOUNTER — OFFICE VISIT (OUTPATIENT)
Dept: FAMILY MEDICINE | Facility: CLINIC | Age: 37
End: 2022-01-07
Payer: MEDICARE

## 2022-01-07 VITALS
WEIGHT: 152.7 LBS | HEIGHT: 60 IN | BODY MASS INDEX: 29.98 KG/M2 | OXYGEN SATURATION: 97 % | SYSTOLIC BLOOD PRESSURE: 114 MMHG | DIASTOLIC BLOOD PRESSURE: 62 MMHG | HEART RATE: 114 BPM | TEMPERATURE: 99.9 F | RESPIRATION RATE: 16 BRPM

## 2022-01-07 DIAGNOSIS — R05.9 COUGH: ICD-10-CM

## 2022-01-07 DIAGNOSIS — Z00.00 ROUTINE GENERAL MEDICAL EXAMINATION AT A HEALTH CARE FACILITY: Primary | ICD-10-CM

## 2022-01-07 LAB
FLUAV AG SPEC QL IA: NEGATIVE
FLUBV AG SPEC QL IA: NEGATIVE

## 2022-01-07 PROCEDURE — 99395 PREV VISIT EST AGE 18-39: CPT | Performed by: STUDENT IN AN ORGANIZED HEALTH CARE EDUCATION/TRAINING PROGRAM

## 2022-01-07 PROCEDURE — 87804 INFLUENZA ASSAY W/OPTIC: CPT | Performed by: STUDENT IN AN ORGANIZED HEALTH CARE EDUCATION/TRAINING PROGRAM

## 2022-01-07 PROCEDURE — U0005 INFEC AGEN DETEC AMPLI PROBE: HCPCS | Performed by: STUDENT IN AN ORGANIZED HEALTH CARE EDUCATION/TRAINING PROGRAM

## 2022-01-07 PROCEDURE — 99213 OFFICE O/P EST LOW 20 MIN: CPT | Mod: 25 | Performed by: STUDENT IN AN ORGANIZED HEALTH CARE EDUCATION/TRAINING PROGRAM

## 2022-01-07 PROCEDURE — U0003 INFECTIOUS AGENT DETECTION BY NUCLEIC ACID (DNA OR RNA); SEVERE ACUTE RESPIRATORY SYNDROME CORONAVIRUS 2 (SARS-COV-2) (CORONAVIRUS DISEASE [COVID-19]), AMPLIFIED PROBE TECHNIQUE, MAKING USE OF HIGH THROUGHPUT TECHNOLOGIES AS DESCRIBED BY CMS-2020-01-R: HCPCS | Performed by: STUDENT IN AN ORGANIZED HEALTH CARE EDUCATION/TRAINING PROGRAM

## 2022-01-07 RX ORDER — ACETAMINOPHEN 325 MG/1
325-650 TABLET ORAL EVERY 6 HOURS PRN
COMMUNITY

## 2022-01-07 ASSESSMENT — ACTIVITIES OF DAILY LIVING (ADL)
CURRENT_FUNCTION: MONEY MANAGEMENT REQUIRES ASSISTANCE
CURRENT_FUNCTION: PREPARING MEALS REQUIRES ASSISTANCE
CURRENT_FUNCTION: BATHING REQUIRES ASSISTANCE
CURRENT_FUNCTION: TELEPHONE REQUIRES ASSISTANCE
CURRENT_FUNCTION: SHOPPING REQUIRES ASSISTANCE
CURRENT_FUNCTION: TRANSPORTATION REQUIRES ASSISTANCE
CURRENT_FUNCTION: HOUSEWORK REQUIRES ASSISTANCE
CURRENT_FUNCTION: LAUNDRY REQUIRES ASSISTANCE
CURRENT_FUNCTION: MEDICATION ADMINISTRATION REQUIRES ASSISTANCE

## 2022-01-07 ASSESSMENT — MIFFLIN-ST. JEOR: SCORE: 1299.14

## 2022-01-07 NOTE — PROGRESS NOTES
"   SUBJECTIVE:   CC: Gemini Mccabe is an 36 year old woman who presents for preventive health visit.     Patient has been advised of split billing requirements and indicates understanding: Yes  Healthy Habits:    In general, how would you rate your overall health?  Good    Duration of exercise:  Other    Do you usually eat at least 4 servings of fruit and vegetables a day, include whole grains    & fiber and avoid regularly eating high fat or \"junk\" foods?  Yes    Taking medications regularly:  Yes    Barriers to taking medications:  None    Medication side effects:  None    Ability to successfully perform activities of daily living:  Telephone requires assistance, transportation requires assistance, shopping requires assistance, preparing meals requires assistance, housework requires assistance, bathing requires assistance, laundry requires assistance, medication administration requires assistance and money management requires assistance    Home Safety:  No safety concerns identified    Hearing Impairment:  No hearing concerns    PHQ-2 Total Score:    Additional concerns today:  No    And is seen today with her guardian with a family friend and known her whole life.  And has severe intellectual disability and needs assistance continuously throughout the day.  She does go to [facility] during the week and does interact with community there.  History was reviewed today.  She has a history of epilepsy but has not had any issues for years and not on any medications for this.  She does use Depo consistently for irregular periods.  Has been stable with risperidone as needed and Seroquel nightly to help with sleep.  He does have Ativan listed but they never used this.    Today's PHQ-2 Score:   PHQ-2 ( 1999 Pfizer) 3/9/2021   Q1: Little interest or pleasure in doing things 0   Q2: Feeling down, depressed or hopeless 0   PHQ-2 Score 0   PHQ-2 Total Score (12-17 Years)- Positive if 3 or more points; Administer PHQ-A if positive " 0   Q1: Little interest or pleasure in doing things -   Q2: Feeling down, depressed or hopeless -   PHQ-2 Score -       Abuse: Current or Past (Physical, Sexual or Emotional) - No  Do you feel safe in your environment? Yes        Social History     Tobacco Use     Smoking status: Passive Smoke Exposure - Never Smoker     Smokeless tobacco: Never Used   Substance Use Topics     Alcohol use: No     If you drink alcohol do you typically have >3 drinks per day or >7 drinks per week? No    Alcohol Use 2022   Prescreen: >3 drinks/day or >7 drinks/week? -   Prescreen: >3 drinks/day or >7 drinks/week? No       Reviewed orders with patient.  Reviewed health maintenance and updated orders accordingly - Yes    Breast Cancer Screening:   Any new diagnosis of family breast, ovarian, or bowel cancer? No    FHS-7: No flowsheet data found.  Pertinent mammograms are reviewed under the imaging tab.    History of abnormal Pap smear: No, they have elected to do not do Pap smears given patient discomfort.     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     Unspecified constipation      Unspecified epilepsy without mention of intractable epilepsy     Seizure disorder-last , not on meds     Unspecified intellectual disabilities     Non verbal      Past Surgical History:   Procedure Laterality Date     ZZC REIMPLANT URETER,SINGLE URETER       OB History    Para Term  AB Living   0 0 0 0 0 0   SAB IAB Ectopic Multiple Live Births   0 0 0 0 0       Review of Systems  CONSTITUTIONAL: NEGATIVE for fever, chills, change in weight, does seem  very tired today  INTEGUMENTARU/SKIN: NEGATIVE for worrisome rashes, moles or lesions  EYES: NEGATIVE for vision changes or irritation  ENT: NEGATIVE for ear, mouth and throat problems  RESP: NEGATIVE for significant SOB, she has started having a cough  BREAST: NEGATIVE for masses, tenderness or discharge  CV: NEGATIVE for chest pain, palpitations or peripheral edema  GI: NEGATIVE for nausea, abdominal pain, heartburn, or change in bowel habits  MUSCULOSKELETAL: NEGATIVE for significant arthralgias or myalgia  NEURO: NEGATIVE for weakness, dizziness or paresthesias  PSYCHIATRIC: NEGATIVE for changes in mood or affect     OBJECTIVE:   /62 (BP Location: Left arm, Patient Position: Sitting, Cuff Size: Adult Regular)   Pulse 114   Temp 99.9  F (37.7  C) (Temporal)   Resp 16   Ht 1.524 m (5')   Wt 69.3 kg (152 lb 11.2 oz)   SpO2 97%   BMI 29.82 kg/m    Physical Exam  GENERAL:alert and no distress  EYES: Eyes grossly normal to inspection, PERRL and conjunctivae and sclerae normal  HENT: ear canals and TM's normal, nose and mouth without ulcers or lesions  RESP: lungs clear to auscultation - no rales, rhonchi or wheezes  CV: regular rate and rhythm, normal S1 S2, no S3 or S4, no murmur, click or rub, no peripheral edema and peripheral pulses strong  ABDOMEN: soft, nontender, no masses and bowel sounds normal  MS: no gross musculoskeletal defects noted, no edema  SKIN: no suspicious lesions or rashes  NEURO: Normal strength and tone  PSYCH: tired laying on bed, nonverbal    Diagnostic Test Results:  Labs reviewed in Epic    ASSESSMENT/PLAN:   Gemini was seen today for physical.    Diagnoses and all orders for this visit:    Routine general medical examination at a health care facility  Lengthy discussion today regarding COVID and influenza vaccination in the future for her.  Would not recommend anything currently given acute illness.    Cough  Given new cough and fatigue that is apparent today we will test for  flu and COVID.  -     Influenza A/B antigen  -     Symptomatic; Unknown COVID-19 Virus (Coronavirus) by PCR Nose        Patient has been advised of split billing requirements and indicates understanding: Yes  COUNSELING:  Reviewed preventive health counseling, as reflected in patient instructions       Regular exercise       Healthy diet/nutrition       Vision screening       Hearing screening       Immunizations    Declined: COVID by guardian.        Estimated body mass index is 29.82 kg/m  as calculated from the following:    Height as of this encounter: 1.524 m (5').    Weight as of this encounter: 69.3 kg (152 lb 11.2 oz).      She reports that she is a non-smoker but has been exposed to tobacco smoke. She has never used smokeless tobacco.      Counseling Resources:  ATP IV Guidelines  Pooled Cohorts Equation Calculator  Breast Cancer Risk Calculator  BRCA-Related Cancer Risk Assessment: FHS-7 Tool  FRAX Risk Assessment  ICSI Preventive Guidelines  Dietary Guidelines for Americans, 2010  USDA's MyPlate  ASA Prophylaxis  Lung CA Screening    Ahmet Leslie MD  [facility]

## 2022-01-08 LAB — SARS-COV-2 RNA RESP QL NAA+PROBE: POSITIVE

## 2022-02-04 ENCOUNTER — OFFICE VISIT (OUTPATIENT)
Dept: FAMILY MEDICINE | Facility: CLINIC | Age: 37
End: 2022-02-04
Payer: MEDICARE

## 2022-02-04 VITALS
RESPIRATION RATE: 18 BRPM | WEIGHT: 153.8 LBS | TEMPERATURE: 96.8 F | HEART RATE: 104 BPM | BODY MASS INDEX: 29.04 KG/M2 | SYSTOLIC BLOOD PRESSURE: 112 MMHG | HEIGHT: 61 IN | OXYGEN SATURATION: 98 % | DIASTOLIC BLOOD PRESSURE: 68 MMHG

## 2022-02-04 DIAGNOSIS — K08.9 DENTAL DISEASE: ICD-10-CM

## 2022-02-04 DIAGNOSIS — F79 INTELLECTUAL DISABILITY: ICD-10-CM

## 2022-02-04 DIAGNOSIS — Z01.818 PREOP GENERAL PHYSICAL EXAM: Primary | ICD-10-CM

## 2022-02-04 PROCEDURE — 99214 OFFICE O/P EST MOD 30 MIN: CPT | Performed by: FAMILY MEDICINE

## 2022-02-04 RX ORDER — CEPHALEXIN 500 MG/1
500 CAPSULE ORAL 3 TIMES DAILY
Qty: 30 CAPSULE | Refills: 0 | Status: SHIPPED | OUTPATIENT
Start: 2022-02-04 | End: 2022-02-18

## 2022-02-04 ASSESSMENT — MIFFLIN-ST. JEOR: SCORE: 1320.01

## 2022-02-04 NOTE — PATIENT INSTRUCTIONS
Preparing for Your Surgery  Getting started  A nurse will call you to review your health history and instructions. They will give you an arrival time based on your scheduled surgery time. Please be ready to share:    Your doctor's clinic name and phone number    Your medical, surgical and anesthesia history    A list of allergies and sensitivities    A list of medicines, including herbal treatments and over-the-counter drugs    Whether the patient has a legal guardian (ask how to send us the papers in advance)  Please tell us if you're pregnant--or if there's any chance you might be pregnant. Some surgeries may injure a fetus (unborn baby), so they require a pregnancy test. Surgeries that are safe for a fetus don't always need a test, and you can choose whether to have one.   If you have a child who's having surgery, please ask for a copy of Preparing for Your Child's Surgery.    Preparing for surgery    Within 30 days of surgery: Have a pre-op exam (sometimes called an H&P, or History and Physical). This can be done at a clinic or pre-operative center.  ? If you're having a , you may not need this exam. Talk to your care team.    At your pre-op exam, talk to your care team about all medicines you take. If you need to stop any medicines before surgery, ask when to start taking them again.  ? We do this for your safety. Many medicines can make you bleed too much during surgery. Some change how well surgery (anesthesia) drugs work.    Call your insurance company to let them know you're having surgery. (If you don't have insurance, call [phone].)    Call your clinic if there's any change in your health. This includes signs of a cold or flu (sore throat, runny nose, cough, rash, fever). It also includes a scrape or scratch near the surgery site.    If you have questions on the day of surgery, call your hospital or surgery center.  COVID testing  You may need to be tested for COVID-19 before having  surgery. If so, your surgical team will give you instructions for scheduling this test, separate from your preoperative history and physical.  Eating and drinking guidelines  For your safety: Unless your surgeon tells you otherwise, follow the guidelines below.    Eat and drink as usual until 8 hours before surgery. After that, no food or milk.    Drink clear liquids until 2 hours before surgery. These are liquids you can see through, like water, Gatorade and Propel Water. You may also have black coffee and tea (no cream or milk).    Nothing by mouth within 2 hours of surgery. This includes gum, candy and breath mints.    If you drink alcohol: Stop drinking it the night before surgery.    If your care team tells you to take medicine on the morning of surgery, it's okay to take it with a sip of water.  Preventing infection    Shower or bathe the night before and morning of your surgery. Follow the instructions your clinic gave you. (If no instructions, use regular soap.)    Don't shave or clip hair near your surgery site. We'll remove the hair if needed.    Don't smoke or vape the morning of surgery. You may chew nicotine gum up to 2 hours before surgery. A nicotine patch is okay.  ? Note: Some surgeries require you to completely quit smoking and nicotine. Check with your surgeon.    Your care team will make every effort to keep you safe from infection. We will:  ? Clean our hands often with soap and water (or an alcohol-based hand rub).  ? Clean the skin at your surgery site with a special soap that kills germs.  ? Give you a special gown to keep you warm. (Cold raises the risk of infection.)  ? Wear special hair covers, masks, gowns and gloves during surgery.  ? Give antibiotic medicine, if prescribed. Not all surgeries need antibiotics.  What to bring on the day of surgery    Photo ID and insurance card    Copy of your health care directive, if you have one    Glasses and hearing aides (bring cases)  ? You can't  wear contacts during surgery    Inhaler and eye drops, if you use them (tell us about these when you arrive)    CPAP machine or breathing device, if you use them    A few personal items, if spending the night    If you have . . .  ? A pacemaker, ICD (cardiac defibrillator) or other implant: Bring the ID card.  ? An implanted stimulator: Bring the remote control.  ? A legal guardian: Bring a copy of the certified (court-stamped) guardianship papers.  Please remove any jewelry, including body piercings. Leave jewelry and other valuables at home.  If you're going home the day of surgery    You must have a responsible adult drive you home. They should stay with you overnight as well.    If you don't have someone to stay with you, and you aren't safe to go home alone, we may keep you overnight. Insurance often won't pay for this.  After surgery  If it's hard to control your pain or you need more pain medicine, please call your surgeon's office.  Questions?   If you have any questions for your care team, list them here: _________________________________________________________________________________________________________________________________________________________________________ ____________________________________ ____________________________________ ____________________________________  For informational purposes only. Not to replace the advice of your health care provider. Copyright   2003, 2019 [facility]. All rights reserved. Clinically reviewed by Altagracia Valdes MD. [employer] [id number] - REV 07/21.

## 2022-02-04 NOTE — PROGRESS NOTES
[address] [address]  Phone: [phone]  Fax: [phone]  Primary Provider: No Ref-Primary, Physician  Pre-op Performing Provider: ANGELITA FLOWERS    PREOPERATIVE EVALUATION:  Today's date: 2/4/2022    Gemini Mccabe is a 36 year old female who presents for a preoperative evaluation.    Surgical Information:  Surgery/Procedure: dental  Surgery Location: [facility]   Surgeon: Maylin  Surgery Date: 3/2/22  Time of Surgery: TBD  Where patient plans to recover: At home with family  Fax number for surgical facility: [phone]        Type of Anesthesia Anticipated: General    Assessment & Plan     The proposed surgical procedure is considered LOW risk.    Preop general physical exam      Dental disease  Needs to be did sedated just for regular dental checkup but there is concern of perhaps infection in the left upper gumline molar.  The dentist recommended getting on an antibiotic.  - cephALEXin (KEFLEX) 500 MG capsule; Take 1 capsule (500 mg) by mouth 3 times daily    Mental retardation  Prevents her from cooperating with dental exam.      Possible Sleep Apnea:                   RECOMMENDATION:  APPROVAL GIVEN to proceed with proposed procedure, without further diagnostic evaluation.                      Subjective     HPI related to upcoming procedure: Has to have sedation for her regular dental checkups she would not cooperate with keeping her mouth open because of her mental challenges.    Preop Questions 2/4/2022   1. Have you ever had a heart attack or stroke? No   2. Have you ever had surgery on your heart or blood vessels, such as a stent placement, a coronary artery bypass, or surgery on an artery in your head, neck, heart, or legs? No   3. Do you have chest pain with activity? No   4. Do you have a history of  heart failure? No   5. Do you currently have a cold, bronchitis or symptoms of other infection? No   6. Do you have  a cough, shortness of breath, or wheezing? No   7. Do you or anyone in your family have previous history of blood clots? UNKNOWN    8. Do you or does anyone in your family have a serious bleeding problem such as prolonged bleeding following surgeries or cuts? No   9. Have you ever had problems with anemia or been told to take iron pills? No   10. Have you had any abnormal blood loss such as black, tarry or bloody stools, or abnormal vaginal bleeding? No   11. Have you ever had a blood transfusion? UNKNOWN    12. Are you willing to have a blood transfusion if it is medically needed before, during, or after your surgery? Yes   13. Have you or any of your relatives ever had problems with anesthesia? UNKNOWN    14. Do you have sleep apnea, excessive snoring or daytime drowsiness? UNKNOWN    14a. Do you have a CPAP machine? No   15. Do you have any artifical heart valves or other implanted medical devices like a pacemaker, defibrillator, or continuous glucose monitor? No   16. Do you have artificial joints? No   17. Are you allergic to latex? No   18. Is there any chance that you may be pregnant? No       Health Care Directive:  Patient has a Health Care Directive on file      Preoperative Review of :   reviewed - no record of controlled substances prescribed.          Review of Systems  CONSTITUTIONAL: NEGATIVE for fever, chills, change in weight  INTEGUMENTARY/SKIN: NEGATIVE for worrisome rashes, moles or lesions  ENT/MOUTH: NEGATIVE for ear, mouth and throat problems  RESP: NEGATIVE for significant cough or SOB  CV: NEGATIVE for chest pain, palpitations or peripheral edema  MUSCULOSKELETAL: NEGATIVE for significant arthralgias or myalgia    Patient Active Problem List    Diagnosis Date Noted     Pneumonia 11/12/2010     Priority: High     Horseshoe kidney 10/05/2018     Priority: Medium     Frequent UTI 10/05/2018     Priority: Medium     UTI (urinary tract infection) 05/27/2011     Priority: Medium     seems to  "be resolved.  suspected renal calculi       CARDIOVASCULAR SCREENING; LDL GOAL LESS THAN 160 10/31/2010     Priority: Medium     Chronic UTI 01/16/2010     Priority: Medium     Active autistic disorder      Priority: Medium     Problem list name updated by automated process. Provider to review       Mental retardation 11/10/2003     Priority: Medium     Problem list name updated by automated process. Provider to review       Epilepsy (H) 11/10/2003     Priority: Medium     Problem list name updated by automated process. Provider to review       Absence of menstruation 11/10/2003     Priority: Medium     Constipation 11/10/2003     Priority: Medium     Problem list name updated by automated process. 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     Unspecified constipation      Unspecified epilepsy without mention of intractable epilepsy     Seizure disorder-last 1998, not on meds     Unspecified intellectual disabilities     Non verbal     Past Surgical History:   Procedure Laterality Date     ZZC REIMPLANT URETER,SINGLE URETER  1985         Allergies   Allergen Reactions     Prevnar      Sulfa Drugs Nausea and Vomiting        Social History     Tobacco Use     Smoking status: Passive Smoke Exposure - Never Smoker     Smokeless tobacco: Never Used   Substance Use Topics     Alcohol use: No     Family History   Problem Relation Age of Onset     Heart Disease Father         MI at age 59     Connective Tissue Disorder Mother         lupus     Heart Disease Other         Grandfather     History   Drug Use No         Objective     /68 (BP Location: Right arm, Patient Position: Sitting, Cuff Size: Adult Large)   Pulse 104   Temp 96.8  F (36  C) (Temporal)   Resp 18   Ht 1.549 m (5' 1\")   Wt 69.8 kg (153 " lb 12.8 oz)   SpO2 98%   BMI 29.06 kg/m      Physical Exam    GENERAL APPEARANCE: healthy, alert and no distress     EYES: EOMI, PERRL     HENT: ear canals and TM's normal     NECK: no adenopathy, no asymmetry, masses, or scars and thyroid normal to palpation     RESP: lungs clear to auscultation - no rales, rhonchi or wheezes     CV: regular rates and rhythm, normal S1 S2, no S3 or S4 and no murmur, click or rub     ABDOMEN:  soft, nontender, no HSM or masses and bowel sounds normal     MS: extremities normal- no gross deformities noted, no evidence of inflammation in joints, FROM in all extremities.     SKIN: no suspicious lesions or rashes     NEURO: Does not communicate     PSYCH: mentation appears abnormal due to mental retardation, affect flat and worried     LYMPHATICS: No cervical adenopathy    No results for input(s): HGB, PLT, INR, NA, POTASSIUM, CR, A1C in the last 14302 hours.     Diagnostics:  No labs were ordered during this visit.   No EKG required for low risk surgery (cataract, skin procedure, breast biopsy, etc).    Revised Cardiac Risk Index (RCRI):  The patient has the following serious cardiovascular risks for perioperative complications:   - No serious cardiac risks = 0 points     RCRI Interpretation: 0 points: Class I (very low risk - 0.4% complication rate)           Signed Electronically by: Srinath Olsen MD  Copy of this evaluation report is provided to requesting physician.

## 2022-02-10 ENCOUNTER — TELEPHONE (OUTPATIENT)
Dept: FAMILY MEDICINE | Facility: CLINIC | Age: 37
End: 2022-02-10
Payer: MEDICARE

## 2022-02-10 NOTE — TELEPHONE ENCOUNTER
Guardian calling to state [employer] will be faxing form over for order for pull ups. She stated the form will be faxed to Dr. Olsen and is asking if he could please complete. They are still deciding on a primary for patient. Rebeka Faye LPN

## 2022-02-17 DIAGNOSIS — K21.00 GASTROESOPHAGEAL REFLUX DISEASE WITH ESOPHAGITIS WITHOUT HEMORRHAGE: ICD-10-CM

## 2022-02-17 DIAGNOSIS — K08.9 DENTAL DISEASE: ICD-10-CM

## 2022-02-18 RX ORDER — OMEPRAZOLE 40 MG/1
CAPSULE, DELAYED RELEASE ORAL
Qty: 28 CAPSULE | Refills: 5 | Status: SHIPPED | OUTPATIENT
Start: 2022-02-18 | End: 2022-09-07

## 2022-02-18 RX ORDER — CEPHALEXIN 500 MG/1
CAPSULE ORAL
Qty: 30 CAPSULE | Refills: 0 | Status: SHIPPED | OUTPATIENT
Start: 2022-02-18 | End: 2022-06-01

## 2022-02-18 NOTE — TELEPHONE ENCOUNTER
Pending Prescriptions:                       Disp   Refills    cephALEXin (KEFLEX) 500 MG capsule [Pharma*30 cap*0        Sig: TAKE 1 CAPSULE BY MOUTH THREE TIMES DAILY    Routing refill request to provider for review/approval because:  Drug not on the [facility] refill protocol     Requested Prescriptions   Pending Prescriptions Disp Refills    cephALEXin (KEFLEX) 500 MG capsule [Pharmacy Med Name: CEPHALEXIN 500 MG CAPSULE] 30 capsule 0     Sig: TAKE 1 CAPSULE BY MOUTH THREE TIMES DAILY        There is no refill protocol information for this order

## 2022-03-13 NOTE — TELEPHONE ENCOUNTER
Reason for Call: Request for an order or referral:    Order or referral being requested: UA/UC    Date needed: as soon as possible    Has the patient been seen by the PCP for this problem? YES    Additional comments: Guardian Lucretia calling. She thought there was a standing order. She dropped off a sample and was told they could not run it. Can you place a new order?     Phone number Patient can be reached at:  Home number on file [phone] (home)    Best Time:  Any     Can we leave a detailed message on this number?  YES    Call taken on 11/14/2019 at 8:07 AM by Lucero Graves     no

## 2022-03-23 ENCOUNTER — ALLIED HEALTH/NURSE VISIT (OUTPATIENT)
Dept: FAMILY MEDICINE | Facility: CLINIC | Age: 37
End: 2022-03-23
Payer: MEDICARE

## 2022-03-23 DIAGNOSIS — Z30.42 ENCOUNTER FOR SURVEILLANCE OF INJECTABLE CONTRACEPTIVE: Primary | ICD-10-CM

## 2022-03-23 PROCEDURE — 99207 PR NO CHARGE NURSE ONLY: CPT

## 2022-03-23 PROCEDURE — 96372 THER/PROPH/DIAG INJ SC/IM: CPT | Performed by: STUDENT IN AN ORGANIZED HEALTH CARE EDUCATION/TRAINING PROGRAM

## 2022-03-23 RX ADMIN — MEDROXYPROGESTERONE ACETATE 150 MG: 150 INJECTION, SUSPENSION INTRAMUSCULAR at 10:23

## 2022-03-23 NOTE — PROGRESS NOTES
Clinic Administered Medication Documentation    Administrations This Visit     medroxyPROGESTERone (DEPO-PROVERA) injection 150 mg     Admin Date  03/23/2022 Action  Given Dose  150 mg Route  Intramuscular Site  Left Deltoid Administered By  Radha Cross    Ordering Provider: Ahmet Leslie MD    Patient Supplied?: No                  Depo Provera Documentation    URINE HCG: not indicated    Depo-Provera Standing Order inclusion/exclusion criteria reviewed.   Patient meets: inclusion criteria     BP: Data Unavailable  LAST PAP/EXAM: No results found for: PAP    Prior to injection, verified patient identity using patient's name and date of birth. Medication was administered. Please see MAR and medication order for additional information.     Was entire vial of medication used? Yes  Vial/Syringe: Single dose vial  Expiration Date:  09/23    Patient instructed to remain in clinic for 15 minutes and report any adverse reaction to staff immediately .  NEXT INJECTION DUE: 6/8/22 - 6/22/22

## 2022-03-31 ENCOUNTER — OFFICE VISIT (OUTPATIENT)
Dept: FAMILY MEDICINE | Facility: CLINIC | Age: 37
End: 2022-03-31
Payer: MEDICARE

## 2022-03-31 VITALS
SYSTOLIC BLOOD PRESSURE: 92 MMHG | OXYGEN SATURATION: 95 % | HEART RATE: 93 BPM | TEMPERATURE: 97.2 F | RESPIRATION RATE: 18 BRPM | DIASTOLIC BLOOD PRESSURE: 78 MMHG | WEIGHT: 156.13 LBS | BODY MASS INDEX: 29.5 KG/M2

## 2022-03-31 DIAGNOSIS — Q63.1 HORSESHOE KIDNEY: ICD-10-CM

## 2022-03-31 DIAGNOSIS — Z13.6 CARDIOVASCULAR SCREENING; LDL GOAL LESS THAN 160: ICD-10-CM

## 2022-03-31 DIAGNOSIS — Z00.00 ROUTINE GENERAL MEDICAL EXAMINATION AT A HEALTH CARE FACILITY: Primary | ICD-10-CM

## 2022-03-31 LAB
ANION GAP SERPL CALCULATED.3IONS-SCNC: 3 MMOL/L (ref 3–14)
BUN SERPL-MCNC: 13 MG/DL (ref 7–30)
CALCIUM SERPL-MCNC: 9 MG/DL (ref 8.5–10.1)
CHLORIDE BLD-SCNC: 111 MMOL/L (ref 94–109)
CHOLEST SERPL-MCNC: 190 MG/DL
CO2 SERPL-SCNC: 29 MMOL/L (ref 20–32)
CREAT SERPL-MCNC: 0.83 MG/DL (ref 0.52–1.04)
FASTING STATUS PATIENT QL REPORTED: YES
GFR SERPL CREATININE-BSD FRML MDRD: >90 ML/MIN/1.73M2
GLUCOSE BLD-MCNC: 81 MG/DL (ref 70–99)
HBA1C MFR BLD: 4.8 % (ref 0–5.6)
HDLC SERPL-MCNC: 59 MG/DL
LDLC SERPL CALC-MCNC: 121 MG/DL
NONHDLC SERPL-MCNC: 131 MG/DL
POTASSIUM BLD-SCNC: 3.9 MMOL/L (ref 3.4–5.3)
SODIUM SERPL-SCNC: 143 MMOL/L (ref 133–144)
TRIGL SERPL-MCNC: 48 MG/DL

## 2022-03-31 PROCEDURE — 80061 LIPID PANEL: CPT | Performed by: FAMILY MEDICINE

## 2022-03-31 PROCEDURE — 80048 BASIC METABOLIC PNL TOTAL CA: CPT | Performed by: FAMILY MEDICINE

## 2022-03-31 PROCEDURE — 83036 HEMOGLOBIN GLYCOSYLATED A1C: CPT | Performed by: FAMILY MEDICINE

## 2022-03-31 PROCEDURE — 99213 OFFICE O/P EST LOW 20 MIN: CPT | Performed by: FAMILY MEDICINE

## 2022-03-31 PROCEDURE — 36415 COLL VENOUS BLD VENIPUNCTURE: CPT | Performed by: FAMILY MEDICINE

## 2022-03-31 ASSESSMENT — PAIN SCALES - GENERAL: PAINLEVEL: NO PAIN (0)

## 2022-03-31 NOTE — PROGRESS NOTES
Assessment & Plan           Routine general medical examination at a health care facility    - Hemoglobin A1c (aka HBA1C); Future  - Hemoglobin A1c (aka HBA1C)    CARDIOVASCULAR SCREENING; LDL GOAL LESS THAN 160    - Lipid panel reflex to direct LDL Fasting; Future  - Lipid panel reflex to direct LDL Fasting    Horseshoe kidney    - Basic metabolic panel  (Ca, Cl, CO2, Creat, Gluc, K, Na, BUN); Future  - Albumin Random Urine Quantitative with Creat Ratio; Future  - Basic metabolic panel  (Ca, Cl, CO2, Creat, Gluc, K, Na, BUN)    All laboratory studies were normal this will be communicated through my chart  Hugh Cronin MD, MD  [facility]    Alexia Singletary is a 36 year old who presents for the following health issues  accompanied by her Guardian .    History of Present Illness       Reason for visit:  A1C and labs  Symptom onset:  Today  Symptoms include:  Behavioral doctor requested    She eats 0-1 servings of fruits and vegetables daily.She consumes 0 sweetened beverage(s) daily.She exercises with enough effort to increase her heart rate 9 or less minutes per day.  She exercises with enough effort to increase her heart rate 3 or less days per week.   She is taking medications regularly.     Patient is here because her behavioral physician required some screening laboratory studies they are going to establish care with one of the other providers in clinic as they know I will be leaving within the next year or 2.  They are wondering if I would just order some labs that the behavioral physician had requested today they have no other requests at this time or concerns.  Patient does live with her caregiver.      Review of Systems   Constitutional, HEENT, cardiovascular, pulmonary, gi and gu systems are negative, except as otherwise noted.      Objective    There were no vitals taken for this visit.  There is no height or weight on file to calculate BMI.  Physical Exam   GENERAL: healthy,  alert and no distress    Results for orders placed or performed in visit on 03/31/22 (from the past 24 hour(s))   Lipid panel reflex to direct LDL Fasting   Result Value Ref Range    Cholesterol 190 <200 mg/dL    Triglycerides 48 <150 mg/dL    Direct Measure HDL 59 >=50 mg/dL    LDL Cholesterol Calculated 121 (H) <=100 mg/dL    Non HDL Cholesterol 131 (H) <130 mg/dL    Patient Fasting > 8hrs? Yes     Narrative    Cholesterol  Desirable:  <200 mg/dL    Triglycerides  Normal:  Less than 150 mg/dL  Borderline High:  150-199 mg/dL  High:  200-499 mg/dL  Very High:  Greater than or equal to 500 mg/dL    Direct Measure HDL  Female:  Greater than or equal to 50 mg/dL   Male:  Greater than or equal to 40 mg/dL    LDL Cholesterol  Desirable:  <100mg/dL  Above Desirable:  100-129 mg/dL   Borderline High:  130-159 mg/dL   High:  160-189 mg/dL   Very High:  >= 190 mg/dL    Non HDL Cholesterol  Desirable:  130 mg/dL  Above Desirable:  130-159 mg/dL  Borderline High:  160-189 mg/dL  High:  190-219 mg/dL  Very High:  Greater than or equal to 220 mg/dL   Basic metabolic panel  (Ca, Cl, CO2, Creat, Gluc, K, Na, BUN)   Result Value Ref Range    Sodium 143 133 - 144 mmol/L    Potassium 3.9 3.4 - 5.3 mmol/L    Chloride 111 (H) 94 - 109 mmol/L    Carbon Dioxide (CO2) 29 20 - 32 mmol/L    Anion Gap 3 3 - 14 mmol/L    Urea Nitrogen 13 7 - 30 mg/dL    Creatinine 0.83 0.52 - 1.04 mg/dL    Calcium 9.0 8.5 - 10.1 mg/dL    Glucose 81 70 - 99 mg/dL    GFR Estimate >90 >60 mL/min/1.73m2   Hemoglobin A1c (aka HBA1C)   Result Value Ref Range    Hemoglobin A1C 4.8 0.0 - 5.6 %

## 2022-06-01 ENCOUNTER — OFFICE VISIT (OUTPATIENT)
Dept: INTERNAL MEDICINE | Facility: CLINIC | Age: 37
End: 2022-06-01
Payer: MEDICARE

## 2022-06-01 VITALS
WEIGHT: 155 LBS | DIASTOLIC BLOOD PRESSURE: 70 MMHG | SYSTOLIC BLOOD PRESSURE: 110 MMHG | RESPIRATION RATE: 10 BRPM | BODY MASS INDEX: 29.29 KG/M2 | HEART RATE: 95 BPM | TEMPERATURE: 97.5 F | OXYGEN SATURATION: 100 %

## 2022-06-01 DIAGNOSIS — B35.1 ONYCHOMYCOSIS: Primary | ICD-10-CM

## 2022-06-01 PROCEDURE — 99213 OFFICE O/P EST LOW 20 MIN: CPT | Performed by: INTERNAL MEDICINE

## 2022-06-01 RX ORDER — CICLOPIROX 80 MG/ML
SOLUTION TOPICAL
Qty: 6 ML | Refills: 3 | Status: SHIPPED | OUTPATIENT
Start: 2022-06-01

## 2022-06-01 NOTE — PROGRESS NOTES
"  Assessment & Plan     Onychomycosis  Will treat with topical treatment, avoid orals that could affect her liver.  Will have her do the topical treatment since she does not suck or bite her nails per the caregiver. Will do this until the nail is completely grown out.  - ciclopirox (PENLAC) 8 % external solution; Apply to adjacent skin and affected nails daily.  Remove with alcohol every 7 days, then repeat.      BMI:   Estimated body mass index is 29.29 kg/m  as calculated from the following:    Height as of 2/4/22: 1.549 m (5' 1\").    Weight as of this encounter: 70.3 kg (155 lb).       No follow-ups on file.    Vishal Gomes MD  [facility]    Alexia Singletary is a 36 year old who presents for the following health issues  accompanied by her friend.    History of Present Illness       Reason for visit:  Fungal infection in fingernails    She eats 0-1 servings of fruits and vegetables daily.She consumes 1 sweetened beverage(s) daily.She exercises with enough effort to increase her heart rate 9 or less minutes per day.  She exercises with enough effort to increase her heart rate 3 or less days per week.   She is taking medications regularly.     Lives with guardian in [place] in an apartment.    Fungal nail disease in the past as on treatment for 6 months.      Review of Systems         Objective    /70   Pulse 95   Temp 97.5  F (36.4  C)   Resp 10   Wt 70.3 kg (155 lb)   SpO2 100%   BMI 29.29 kg/m    There is no height or weight on file to calculate BMI.  Physical Exam   NAD, non verbal but waves to me  Left thumb nail with partial fungal infection                  "

## 2022-06-08 ENCOUNTER — ALLIED HEALTH/NURSE VISIT (OUTPATIENT)
Dept: FAMILY MEDICINE | Facility: CLINIC | Age: 37
End: 2022-06-08
Payer: MEDICARE

## 2022-06-08 DIAGNOSIS — Z30.42 ENCOUNTER FOR SURVEILLANCE OF INJECTABLE CONTRACEPTIVE: Primary | ICD-10-CM

## 2022-06-08 PROCEDURE — 96372 THER/PROPH/DIAG INJ SC/IM: CPT | Performed by: STUDENT IN AN ORGANIZED HEALTH CARE EDUCATION/TRAINING PROGRAM

## 2022-06-08 PROCEDURE — 99207 PR NO CHARGE NURSE ONLY: CPT

## 2022-06-08 RX ADMIN — MEDROXYPROGESTERONE ACETATE 150 MG: 150 INJECTION, SUSPENSION INTRAMUSCULAR at 13:23

## 2022-06-08 NOTE — PROGRESS NOTES
Clinic Administered Medication Documentation    Administrations This Visit     medroxyPROGESTERone (DEPO-PROVERA) injection 150 mg     Admin Date  06/08/2022 Action  Given Dose  150 mg Route  Intramuscular Site  Right Deltoid Administered By  Danni Olsen CMA    Ordering Provider: Ahmet Leslie MD    Patient Supplied?: No                  Depo Provera Documentation    URINE HCG: not indicated    Depo-Provera Standing Order inclusion/exclusion criteria reviewed.   Patient meets: inclusion criteria     BP: Data Unavailable  LAST PAP/EXAM: No results found for: PAP    Prior to injection, verified patient identity using patient's name and date of birth. Medication was administered. Please see MAR and medication order for additional information.     Was entire vial of medication used? Yes  Vial/Syringe: Single dose vial  Expiration Date:  9/2023    Patient instructed to remain in clinic for 15 minutes and report any adverse reaction to staff immediately .  NEXT INJECTION DUE: 8/24/22 - 9/7/22

## 2022-09-01 ENCOUNTER — ALLIED HEALTH/NURSE VISIT (OUTPATIENT)
Dept: FAMILY MEDICINE | Facility: CLINIC | Age: 37
End: 2022-09-01
Payer: MEDICARE

## 2022-09-01 ENCOUNTER — TELEPHONE (OUTPATIENT)
Dept: FAMILY MEDICINE | Facility: CLINIC | Age: 37
End: 2022-09-01

## 2022-09-01 DIAGNOSIS — Z30.42 ENCOUNTER FOR SURVEILLANCE OF INJECTABLE CONTRACEPTIVE: Primary | ICD-10-CM

## 2022-09-01 PROCEDURE — 99207 PR NO CHARGE NURSE ONLY: CPT

## 2022-09-01 PROCEDURE — 96372 THER/PROPH/DIAG INJ SC/IM: CPT | Performed by: FAMILY MEDICINE

## 2022-09-01 RX ORDER — MEDROXYPROGESTERONE ACETATE 150 MG/ML
150 INJECTION, SUSPENSION INTRAMUSCULAR
Status: ACTIVE | OUTPATIENT
Start: 2022-09-01 | End: 2023-02-28

## 2022-09-01 RX ADMIN — MEDROXYPROGESTERONE ACETATE 150 MG: 150 INJECTION, SUSPENSION INTRAMUSCULAR at 13:32

## 2022-09-01 NOTE — PROGRESS NOTES
Clinic Administered Medication Documentation    Administrations This Visit     medroxyPROGESTERone (DEPO-PROVERA) injection 150 mg     Admin Date  09/01/2022 Action  Given Dose  150 mg Route  Intramuscular Site  Right Deltoid Administered By  Radha Cross    Ordering Provider: Hugh Cronin MD    Patient Supplied?: No                  Depo Provera Documentation    URINE HCG: not indicated    Depo-Provera Standing Order inclusion/exclusion criteria reviewed.   Patient meets: inclusion criteria     BP: Data Unavailable  LAST PAP/EXAM: No results found for: PAP    Prior to injection, verified patient identity using patient's name and date of birth. Medication was administered. Please see MAR and medication order for additional information.     Was entire vial of medication used? Yes  Vial/Syringe: Single dose vial  Expiration Date:  4/24    Patient instructed to remain in clinic for 15 minutes and report any adverse reaction to staff immediately .  NEXT INJECTION DUE: 11/17/22 - 12/1/22

## 2022-09-01 NOTE — TELEPHONE ENCOUNTER
Patient is coming in for her depo shot and she is out of orders she seen you 3/31/22 for her physical can you order her more depo please?    Haydee Cross MA 9/1/2022

## 2022-10-23 ENCOUNTER — HEALTH MAINTENANCE LETTER (OUTPATIENT)
Age: 37
End: 2022-10-23

## 2023-04-02 ENCOUNTER — HEALTH MAINTENANCE LETTER (OUTPATIENT)
Age: 38
End: 2023-04-02

## 2023-05-04 DIAGNOSIS — K21.00 GASTROESOPHAGEAL REFLUX DISEASE WITH ESOPHAGITIS WITHOUT HEMORRHAGE: ICD-10-CM

## 2023-05-05 NOTE — TELEPHONE ENCOUNTER
"Requested Prescriptions   Pending Prescriptions Disp Refills    omeprazole (PRILOSEC) 40 MG DR capsule 28 capsule 7     Sig: Take 1 capsule (40 mg) by mouth daily       PPI Protocol Failed - 5/4/2023  6:03 PM        Failed - Recent (12 mo) or future (30 days) visit within the authorizing provider's specialty     Patient has had an office visit with the authorizing provider or a provider within the authorizing providers department within the previous 12 mos or has a future within next 30 days. See \"Patient Info\" tab in inbasket, or \"Choose Columns\" in Meds & Orders section of the refill encounter.              Passed - Not on Clopidogrel (unless Pantoprazole ordered)        Passed - No diagnosis of osteoporosis on record        Passed - Medication is active on med list        Passed - Patient is age 18 or older        Passed - No active pregnacy on record        Passed - No positive pregnancy test in past 12 months             "
34.4

## 2023-05-08 RX ORDER — OMEPRAZOLE 40 MG/1
40 CAPSULE, DELAYED RELEASE ORAL DAILY
Qty: 60 CAPSULE | Refills: 0 | Status: SHIPPED | OUTPATIENT
Start: 2023-05-08

## 2024-03-14 NOTE — ADDENDUM NOTE
Addended by: LISA STOKES on: 7/6/2017 12:54 PM     Modules accepted: Orders    
4 = No assist / stand by assistance

## 2024-03-17 ENCOUNTER — APPOINTMENT (OUTPATIENT)
Dept: CT IMAGING | Facility: CLINIC | Age: 39
End: 2024-03-17
Attending: FAMILY MEDICINE
Payer: MEDICARE

## 2024-03-17 ENCOUNTER — HOSPITAL ENCOUNTER (EMERGENCY)
Facility: CLINIC | Age: 39
Discharge: HOME | End: 2024-03-17
Attending: FAMILY MEDICINE | Admitting: FAMILY MEDICINE
Payer: MEDICARE

## 2024-03-17 VITALS
TEMPERATURE: 97 F | RESPIRATION RATE: 18 BRPM | SYSTOLIC BLOOD PRESSURE: 109 MMHG | OXYGEN SATURATION: 97 % | HEART RATE: 97 BPM | DIASTOLIC BLOOD PRESSURE: 80 MMHG

## 2024-03-17 DIAGNOSIS — R47.01 NONVERBAL: ICD-10-CM

## 2024-03-17 DIAGNOSIS — R45.1 AGITATION: ICD-10-CM

## 2024-03-17 LAB
ALBUMIN SERPL BCG-MCNC: 3.9 G/DL (ref 3.5–5.2)
ALBUMIN UR-MCNC: 30 MG/DL
ALP SERPL-CCNC: 52 U/L (ref 40–150)
ALT SERPL W P-5'-P-CCNC: 22 U/L (ref 0–50)
ANION GAP SERPL CALCULATED.3IONS-SCNC: 9 MMOL/L (ref 7–15)
APPEARANCE UR: CLEAR
AST SERPL W P-5'-P-CCNC: 38 U/L (ref 0–45)
BACTERIA #/AREA URNS HPF: ABNORMAL /HPF
BASOPHILS # BLD AUTO: 0 10E3/UL (ref 0–0.2)
BASOPHILS NFR BLD AUTO: 0 %
BILIRUB SERPL-MCNC: 0.4 MG/DL
BILIRUB UR QL STRIP: NEGATIVE
BUN SERPL-MCNC: 8.7 MG/DL (ref 6–20)
CALCIUM SERPL-MCNC: 8.9 MG/DL (ref 8.6–10)
CHLORIDE SERPL-SCNC: 108 MMOL/L (ref 98–107)
COLOR UR AUTO: YELLOW
CREAT SERPL-MCNC: 0.74 MG/DL (ref 0.51–0.95)
DEPRECATED HCO3 PLAS-SCNC: 23 MMOL/L (ref 22–29)
EGFRCR SERPLBLD CKD-EPI 2021: >90 ML/MIN/1.73M2
EOSINOPHIL # BLD AUTO: 0 10E3/UL (ref 0–0.7)
EOSINOPHIL NFR BLD AUTO: 0 %
ERYTHROCYTE [DISTWIDTH] IN BLOOD BY AUTOMATED COUNT: 13.6 % (ref 10–15)
GLUCOSE SERPL-MCNC: 83 MG/DL (ref 70–99)
GLUCOSE UR STRIP-MCNC: NEGATIVE MG/DL
HCG UR QL: NEGATIVE
HCT VFR BLD AUTO: 37.2 % (ref 35–47)
HGB BLD-MCNC: 12.6 G/DL (ref 11.7–15.7)
HGB UR QL STRIP: NEGATIVE
HYALINE CASTS: 1 /LPF
IMM GRANULOCYTES # BLD: 0 10E3/UL
IMM GRANULOCYTES NFR BLD: 0 %
KETONES UR STRIP-MCNC: 5 MG/DL
LEUKOCYTE ESTERASE UR QL STRIP: NEGATIVE
LYMPHOCYTES # BLD AUTO: 1.9 10E3/UL (ref 0.8–5.3)
LYMPHOCYTES NFR BLD AUTO: 22 %
MCH RBC QN AUTO: 30.8 PG (ref 26.5–33)
MCHC RBC AUTO-ENTMCNC: 33.9 G/DL (ref 31.5–36.5)
MCV RBC AUTO: 91 FL (ref 78–100)
MONOCYTES # BLD AUTO: 0.6 10E3/UL (ref 0–1.3)
MONOCYTES NFR BLD AUTO: 7 %
MUCOUS THREADS #/AREA URNS LPF: PRESENT /LPF
NEUTROPHILS # BLD AUTO: 5.9 10E3/UL (ref 1.6–8.3)
NEUTROPHILS NFR BLD AUTO: 70 %
NITRATE UR QL: NEGATIVE
NRBC # BLD AUTO: 0 10E3/UL
NRBC BLD AUTO-RTO: 0 /100
PH UR STRIP: 5 [PH] (ref 5–7)
PLATELET # BLD AUTO: 183 10E3/UL (ref 150–450)
POTASSIUM SERPL-SCNC: 3.4 MMOL/L (ref 3.4–5.3)
PROT SERPL-MCNC: 6.6 G/DL (ref 6.4–8.3)
RBC # BLD AUTO: 4.09 10E6/UL (ref 3.8–5.2)
RBC URINE: 1 /HPF
SODIUM SERPL-SCNC: 140 MMOL/L (ref 135–145)
SP GR UR STRIP: 1.02 (ref 1–1.03)
SQUAMOUS EPITHELIAL: 2 /HPF
TROPONIN T SERPL HS-MCNC: <6 NG/L
UROBILINOGEN UR STRIP-MCNC: 4 MG/DL
WBC # BLD AUTO: 8.5 10E3/UL (ref 4–11)
WBC URINE: 2 /HPF

## 2024-03-17 PROCEDURE — 80053 COMPREHEN METABOLIC PANEL: CPT | Performed by: FAMILY MEDICINE

## 2024-03-17 PROCEDURE — 99291 CRITICAL CARE FIRST HOUR: CPT | Performed by: FAMILY MEDICINE

## 2024-03-17 PROCEDURE — 250N000009 HC RX 250: Performed by: PHYSICIAN ASSISTANT

## 2024-03-17 PROCEDURE — 250N000011 HC RX IP 250 OP 636: Performed by: FAMILY MEDICINE

## 2024-03-17 PROCEDURE — 71260 CT THORAX DX C+: CPT | Mod: MG

## 2024-03-17 PROCEDURE — 250N000009 HC RX 250: Performed by: FAMILY MEDICINE

## 2024-03-17 PROCEDURE — 81025 URINE PREGNANCY TEST: CPT | Performed by: FAMILY MEDICINE

## 2024-03-17 PROCEDURE — 96372 THER/PROPH/DIAG INJ SC/IM: CPT | Performed by: FAMILY MEDICINE

## 2024-03-17 PROCEDURE — 81001 URINALYSIS AUTO W/SCOPE: CPT | Performed by: FAMILY MEDICINE

## 2024-03-17 PROCEDURE — 250N000011 HC RX IP 250 OP 636: Performed by: PHYSICIAN ASSISTANT

## 2024-03-17 PROCEDURE — 85025 COMPLETE CBC W/AUTO DIFF WBC: CPT | Performed by: FAMILY MEDICINE

## 2024-03-17 PROCEDURE — 36415 COLL VENOUS BLD VENIPUNCTURE: CPT | Performed by: FAMILY MEDICINE

## 2024-03-17 PROCEDURE — 70450 CT HEAD/BRAIN W/O DYE: CPT | Mod: MG

## 2024-03-17 PROCEDURE — 84484 ASSAY OF TROPONIN QUANT: CPT | Performed by: FAMILY MEDICINE

## 2024-03-17 RX ORDER — RISPERIDONE 0.25 MG/1
0.25 TABLET ORAL 2 TIMES DAILY
COMMUNITY
Start: 2024-03-01

## 2024-03-17 RX ORDER — QUETIAPINE FUMARATE 100 MG/1
100 TABLET, FILM COATED ORAL AT BEDTIME
COMMUNITY
Start: 2024-03-01

## 2024-03-17 RX ORDER — LORAZEPAM 1 MG/1
1 TABLET ORAL 2 TIMES DAILY PRN
Qty: 10 TABLET | Refills: 0 | Status: SHIPPED | OUTPATIENT
Start: 2024-03-17

## 2024-03-17 RX ORDER — IOPAMIDOL 755 MG/ML
500 INJECTION, SOLUTION INTRAVASCULAR ONCE
Status: COMPLETED | OUTPATIENT
Start: 2024-03-17 | End: 2024-03-17

## 2024-03-17 RX ORDER — LORAZEPAM 1 MG/1
.5-1 TABLET ORAL 2 TIMES DAILY PRN
COMMUNITY
Start: 2023-08-02

## 2024-03-17 RX ORDER — CITALOPRAM HYDROBROMIDE 20 MG/1
20 TABLET ORAL EVERY MORNING
COMMUNITY
Start: 2024-03-01

## 2024-03-17 RX ORDER — KETAMINE HYDROCHLORIDE 10 MG/ML
1 INJECTION INTRAMUSCULAR; INTRAVENOUS ONCE
Status: COMPLETED | OUTPATIENT
Start: 2024-03-17 | End: 2024-03-17

## 2024-03-17 RX ORDER — OLANZAPINE 10 MG/2ML
10 INJECTION, POWDER, FOR SOLUTION INTRAMUSCULAR ONCE
Status: COMPLETED | OUTPATIENT
Start: 2024-03-17 | End: 2024-03-17

## 2024-03-17 RX ORDER — LORAZEPAM 2 MG/ML
2 INJECTION INTRAMUSCULAR ONCE
Status: COMPLETED | OUTPATIENT
Start: 2024-03-17 | End: 2024-03-17

## 2024-03-17 RX ORDER — QUETIAPINE FUMARATE 100 MG/1
200 TABLET, FILM COATED ORAL 2 TIMES DAILY
Status: DISCONTINUED | OUTPATIENT
Start: 2024-03-17 | End: 2024-03-18 | Stop reason: HOSPADM

## 2024-03-17 RX ADMIN — LORAZEPAM 2 MG: 2 INJECTION INTRAMUSCULAR; INTRAVENOUS at 14:32

## 2024-03-17 RX ADMIN — OLANZAPINE 10 MG: 10 INJECTION, POWDER, FOR SOLUTION INTRAMUSCULAR at 14:33

## 2024-03-17 RX ADMIN — MIDAZOLAM 2 MG: 1 INJECTION INTRAMUSCULAR; INTRAVENOUS at 16:47

## 2024-03-17 RX ADMIN — IOPAMIDOL 80 ML: 755 INJECTION, SOLUTION INTRAVENOUS at 20:33

## 2024-03-17 RX ADMIN — KETAMINE HYDROCHLORIDE 70 MG: 10 INJECTION INTRAMUSCULAR; INTRAVENOUS at 19:58

## 2024-03-17 RX ADMIN — SODIUM CHLORIDE 60 ML: 9 INJECTION, SOLUTION INTRAVENOUS at 20:33

## 2024-03-17 RX ADMIN — MIDAZOLAM 2 MG: 1 INJECTION INTRAMUSCULAR; INTRAVENOUS at 15:29

## 2024-03-17 ASSESSMENT — COLUMBIA-SUICIDE SEVERITY RATING SCALE - C-SSRS: IS THE PATIENT NOT ABLE TO COMPLETE C-SSRS: UNABLE TO VERBALIZE

## 2024-03-17 ASSESSMENT — ACTIVITIES OF DAILY LIVING (ADL)
ADLS_ACUITY_SCORE: 35

## 2024-03-17 NOTE — LETTER
March 19, 2024      To Whom It May Concern:      Gemini KAMERON Mccabe was seen in our Emergency Department, 03/17/24.  She is clear for sedation dentistry.      Sincerely,            Davis Anderson PA-C

## 2024-03-17 NOTE — ED NOTES
Patient continues up and pacing in room. She refuses to lie on bed. Bedside commode and bag lunch offered. Patient declines both. Patient's guardian remains in room.

## 2024-03-17 NOTE — ED NOTES
Care givers and guardian concerned the meds given are not helping. Explained that we wanted to give them adequate time to make sure we didn't give more too early. Provider updated. Angelica Wilcox RN

## 2024-03-17 NOTE — ED TRIAGE NOTES
Brought in by caregivers for violent behavior and agitation.  Pt  is non verbal and very agitated in triage, unable to currently get vitals     Triage Assessment (Adult)       Row Name 03/17/24 1419          Triage Assessment    Airway WDL WDL        Respiratory WDL    Respiratory WDL WDL        Skin Circulation/Temperature WDL    Skin Circulation/Temperature WDL WDL

## 2024-03-17 NOTE — ED PROVIDER NOTES
[facility] ED Provider Note   Patient: Gemini Mccabe  MRN #:  [id number]  Date of Visit: March 17, 2024    CC:     Chief Complaint   Patient presents with    Aggressive Behavior     HPI:  Gemini Mccabe is a 38 year old female with history of autism, mental retardation, and is nonverbal who presented to the emergency department with her mother and respite caregiver with increased agitation over the last 4-5 days.  Symptoms started Tuesday where they thought that she was initially acting up with behavior changes.  She has refused taking her Seroquel at nighttime, and Ativan.  She has a as needed Ativan order but it was not working for last couple of days.  Patient's mother reports that she is concerned that something is going on and that may be physical.  She has only had 8 hours of sleep in the last 3 days.  It is hard to know whether she is having pain or if there is anything else going on.  Patient has had to be redirected, as she is trying to leave.  She has been more aggressive and this is not normal behavior.  Patient has some bruises that she throws herself onto her back and will thrash.  No other reported significant injuries or trauma.    Problem List:  Patient Active Problem List    Diagnosis Date Noted    Pneumonia 11/12/2010     Priority: High    Horseshoe kidney 10/05/2018     Priority: Medium    Frequent UTI 10/05/2018     Priority: Medium    UTI (urinary tract infection) 05/27/2011     Priority: Medium     seems to be resolved.  suspected renal calculi      CARDIOVASCULAR SCREENING; LDL GOAL LESS THAN 160 10/31/2010     Priority: Medium    Chronic UTI 01/16/2010     Priority: Medium    Active autistic disorder      Priority: Medium     Problem list name updated by automated process. Provider to review      Mental retardation 11/10/2003     Priority: Medium     Problem list name updated by automated process. Provider to review       Epilepsy (H) 11/10/2003     Priority: Medium     Problem list name updated by automated process. Provider to review      Absence of menstruation 11/10/2003     Priority: Medium    Constipation 11/10/2003     Priority: Medium     Problem list name updated by automated process. Provider to review         Past Medical History:   Diagnosis Date    Absence of menstruation     Autistic disorder, current or active state     Chronic UTI     Other specified congenital anomaly of kidney     Unspecified constipation     Unspecified epilepsy without mention of intractable epilepsy     Unspecified intellectual disabilities        MEDS: citalopram (CELEXA) 20 MG tablet  LORazepam (ATIVAN) 1 MG tablet  QUEtiapine (SEROQUEL) 100 MG tablet  risperiDONE (RISPERDAL) 0.25 MG tablet  acetaminophen (TYLENOL) 325 MG tablet  ciclopirox (PENLAC) 8 % external solution  citalopram (CELEXA) 40 MG tablet  CRANBERRY FRUIT PO  Disposable Gloves (NITRILE GLOVES MEDIUM) MISC  loratadine (CLARITIN) 10 MG tablet  LORazepam (ATIVAN) 0.5 MG tablet  Multiple Vitamins-Minerals (TAB-A-DAVID) TABS  Nebulizer nebulization  omeprazole (PRILOSEC) 40 MG DR capsule  polyethylene glycol (MIRALAX) 17 GM/Dose powder  QUEtiapine (SEROQUEL) 50 MG tablet  risperiDONE (RISPERDAL) 0.5 MG tablet  vitamin C (ASCORBIC ACID) 500 MG tablet        ALLERGIES:    Allergies   Allergen Reactions    Prevnar     Sulfa Antibiotics Nausea and Vomiting       Past Surgical History:   Procedure Laterality Date    [facility] REIMPLANT URETER,SINGLE URETER  1985       Social History     Tobacco Use    Smoking status: Passive Smoke Exposure - Never Smoker    Smokeless tobacco: Never   Vaping Use    Vaping Use: Never used   Substance Use Topics    Alcohol use: No    Drug use: No         Review of Systems   Except as noted in HPI, all other systems were reviewed and are negative    Physical Exam     GENERAL APPEARANCE: Patient is trying to get out of the room.  She is being blocked by  caregivers.  FACE: Atraumatic  EYES: Unable to evaluate  HENT: Dentition, no obvious dental lesions.  Tympanic membrane is normal, left tympanic membrane is obscured by cerumen  NECK: no masses, no tenderness  RESP: normal respiratory effort; clear breath sounds bilaterally  CV: regular rate and rhythm; no significant murmurs, gallops or rubs  ABD: soft, obese, no tenderness; no rebound or guarding; bowel sounds are normal  MS: no gross deformities noted; normal muscle tone.  EXT: No calf tenderness or pitting edema  NEURO: Unable to test as the patient is not following commands or cooperating.  PSYCH: unable to test; patient is agitated and pacing/trying to get out of the room      Available Lab/Imaging Results     Results for orders placed or performed during the hospital encounter of 03/17/24 (from the past 24 hour(s))   CBC with platelets differential    Narrative    The following orders were created for panel order CBC with platelets differential.  Procedure                               Abnormality         Status                     ---------                               -----------         ------                     CBC with platelets and d...[[id number]]                      Final result                 Please view results for these tests on the individual orders.   Comprehensive metabolic panel   Result Value Ref Range    Sodium 140 135 - 145 mmol/L    Potassium 3.4 3.4 - 5.3 mmol/L    Carbon Dioxide (CO2) 23 22 - 29 mmol/L    Anion Gap 9 7 - 15 mmol/L    Urea Nitrogen 8.7 6.0 - 20.0 mg/dL    Creatinine 0.74 0.51 - 0.95 mg/dL    GFR Estimate >90 >60 mL/min/1.73m2    Calcium 8.9 8.6 - 10.0 mg/dL    Chloride 108 (H) 98 - 107 mmol/L    Glucose 83 70 - 99 mg/dL    Alkaline Phosphatase 52 40 - 150 U/L    AST 38 0 - 45 U/L    ALT 22 0 - 50 U/L    Protein Total 6.6 6.4 - 8.3 g/dL    Albumin 3.9 3.5 - 5.2 g/dL    Bilirubin Total 0.4 <=1.2 mg/dL   Troponin T, High Sensitivity   Result Value Ref Range    Troponin T,  High Sensitivity <6 <=14 ng/L   CBC with platelets and differential   Result Value Ref Range    WBC Count 8.5 4.0 - 11.0 10e3/uL    RBC Count 4.09 3.80 - 5.20 10e6/uL    Hemoglobin 12.6 11.7 - 15.7 g/dL    Hematocrit 37.2 35.0 - 47.0 %    MCV 91 78 - 100 fL    MCH 30.8 26.5 - 33.0 pg    MCHC 33.9 31.5 - 36.5 g/dL    RDW 13.6 10.0 - 15.0 %    Platelet Count 183 150 - 450 10e3/uL    % Neutrophils 70 %    % Lymphocytes 22 %    % Monocytes 7 %    % Eosinophils 0 %    % Basophils 0 %    % Immature Granulocytes 0 %    NRBCs per 100 WBC 0 <1 /100    Absolute Neutrophils 5.9 1.6 - 8.3 10e3/uL    Absolute Lymphocytes 1.9 0.8 - 5.3 10e3/uL    Absolute Monocytes 0.6 0.0 - 1.3 10e3/uL    Absolute Eosinophils 0.0 0.0 - 0.7 10e3/uL    Absolute Basophils 0.0 0.0 - 0.2 10e3/uL    Absolute Immature Granulocytes 0.0 <=0.4 10e3/uL    Absolute NRBCs 0.0 10e3/uL   UA with Microscopic reflex to Culture    Specimen: Urine, Catheter   Result Value Ref Range    Color Urine Yellow Colorless, Straw, Light Yellow, Yellow    Appearance Urine Clear Clear    Glucose Urine Negative Negative mg/dL    Bilirubin Urine Negative Negative    Ketones Urine 5 (A) Negative mg/dL    Specific Gravity Urine 1.021 1.003 - 1.035    Blood Urine Negative Negative    pH Urine 5.0 5.0 - 7.0    Protein Albumin Urine 30 (A) Negative mg/dL    Urobilinogen Urine 4.0 (A) Normal, 2.0 mg/dL    Nitrite Urine Negative Negative    Leukocyte Esterase Urine Negative Negative    Bacteria Urine Moderate (A) None Seen /HPF    Mucus Urine Present (A) None Seen /LPF    RBC Urine 1 <=2 /HPF    WBC Urine 2 <=5 /HPF    Squamous Epithelials Urine 2 (H) <=1 /HPF    Hyaline Casts Urine 1 <=2 /LPF    Narrative    Urine Culture not indicated   HCG qualitative urine (UPT)   Result Value Ref Range    hCG Urine Qualitative Negative Negative            Impression     Final diagnoses:   Agitation   Nonverbal         ED Course & Medical Decision Making   Gemini Mccabe is a 38 year old female  with history of autistic disorder, with retardation who presented to the emergency department with several day history of increased agitation, uncertain what is going on.  Patient is nonverbal so additional history cannot be obtained.  Patient has refused taking Seroquel the last couple of nights, and as needed Ativan was not helping with last dose given a couple days ago.  Symptoms started Tuesday today.    3:22 PM: It has been approximately 40 minutes since the patient received 10 mg of Zyprexa IM and 2 mg of Ativan IM.  Patient is still pacing in the room.  She was able to make it to the bed briefly but continues to be agitated.  Versed 2 mg IM was ordered.    4:40 PM: Patient is becoming more calm and sedate but she is still somewhat restless.  We will administer 2 more milligrams of Versed IM.    5:30 PM: We are obtaining blood work and urinalysis without a peripheral IV at this time.    Laboratory workup reveals a normal CBC, comprehensive metabolic panel, troponin, and urinalysis.  There is moderate bacteria but only 1 red blood cell and 2 white blood cells with 2 squamous epithelial cells.  Urine hCG is negative.    6:25 PM: Patient was laying in bed with a security hold by her guardian.  I was able to complete a partial physical examination.  Dentition is poor but no obvious oral lesions.  Lungs are clear, heart sounds are normal, abdomen is obese soft nontender.  Guardian raise concerns for possible constipation as the patient receives MiraLAX.  No suspected major trauma or injuries.    6:45 PM: I discussed with the patient's guardian further workup, and she expressed concern for not being able to identify a physical cause for her behavior.  She has been thrashing about and as well as hitting.  She would not feel comfortable taking her home without knowing if there are any other potential physical issues going on.  Patient will likely need to be sedated for CT scans of the head, chest, abdomen pelvis.  Her  guardian would like to proceed with CT imaging with sedation.  The guardian expressed understanding of risks, benefits and alternatives to advanced imaging.        Patient was signed out to Casey Anderson at the change of shift.    Disclaimer: This note consists of words and symbols derived from keyboarding and dictation using voice recognition software.  As a result, there may be errors that have gone undetected.  Please consider this when interpreting information found in this note.       Timur Anaya MD  03/17/24 [id number]

## 2024-03-18 NOTE — ED NOTES
Pt tolerated CT well after IM ketamine. Pt continues to be vitally stable after all IM medications given. Angelica Wilcox RN

## 2024-03-18 NOTE — ED PROVIDER NOTES
I assumed care of this patient at shift change from Dr. Anaya.  Dr. Anaya and I had a long conversation regarding the workup of this patient.  Since I assumed care, she has been sleeping comfortably.  We were waiting for results on CT head and CT chest/abdomen/pelvis.  Results were reviewed.  I performed independent review of the CT as well.  No intracranial abnormality.  No skull fracture.  No acute chest abnormalities.  Abdomen without infectious or inflammatory etiology as well.  Evidence for cystitis, but her urinalysis is completely negative without sign of infection.  Ultimately, we did not identify any emergent abnormality contributing to the behavioral disturbance.  I had a long conversation with the patient's caregiver regarding this.  Apparently, psychiatry has been trying to wean the patient off of certain medications and decrease the dosing, as she was too sedated on her previous dosing.  Increased behaviors have been occurring now over the past 4 days.  Please see Dr. Anaya's note for full details.  The patient's caregiver, states that she would like to bring the patient home if at all possible.  She states that the patient is more comfortable within the home setting.  She requires a routine.  We discussed the option of trying to adjust the medications to provide more stability with behaviors.  Patient is on 0.5 mg of Risperdal twice daily.  On further questioning, the caregiver states that the patient's behaviors seem to be higher in the morning or midday hours.  Therefore, I have recommended her to increase the Risperdal up to 1 mg in the morning.  Also, for the short-term I would recommend regular dosing of Ativan for the next 2 days of 0.5-1 mg twice daily to help stabilize behaviors.  Hopefully this will make it safe both for the patient and also for her caregiver.  The caregiver felt comfortable with this option.  They have a call into the patient's psychiatrist already, and hopefully will be able to  speak with psychiatry tomorrow, which is Monday morning.  The caregiver is aware that she can always bring the patient back to the emergency department in the event that it is determined to not be safe for the patient or the caregiver if behavior escalates.  Return if fever occurs or other physical symptoms suggestive of underlying illness or abnormality.  At this point in time, we did not identify any abnormality.  Patient has been sleeping comfortably ever since the sedative for the CT scan.  She has been vitally stable without hypoxia.  Therefore, she is determined to be safe for return home.  ED return instructions reviewed with the caregiver in detail and she was in agreement.     Davis Anderson PA-C  03/17/24 [id number]

## 2024-03-18 NOTE — ED NOTES
Pt continues to sleep and not very arousable. Caregiver and provider okay for her to go home and sleep. Pt remains vitally stable. Angelica Wilcox RN

## 2024-03-18 NOTE — DISCHARGE INSTRUCTIONS
Thankfully, we did not identify any emergent abnormalities with Gemini's full evaluation.  Please try and increase her risperidone up to 1 mg in the morning.  Please also schedule the Ativan 0.5-1 tablet twice daily for the next 2 days to try and keep things calm and get her onto more of a behavioral routine.  Please reach out to her psychiatrist right away tomorrow morning for further guidance.  If you do not feel it is safe for you or and in the home environment, please bring her back to the emergency department for repeat evaluation.  I wish you the best!

## 2024-03-19 NOTE — ED PROVIDER NOTES
Caregiver called the emergency department.  The patient is supposed to get sedation dentistry performed today, but they will not perform the sedation without medical clearance.  Patient had a full medical evaluation 2 days ago.  I did evaluate the patient at the end of her visit at shift change.  I do not see any contraindication to her getting sedation for dentistry work.  Workup was negative here in the emergency department.  Therefore, I wrote a letter for the caregiver and the caregiver was instructed that it would be available upfront for pickup.  The charge nurse spoke with the caregiver about this.     Davis Anderson PA-C  03/19/24 [id number]

## 2024-12-22 ENCOUNTER — HEALTH MAINTENANCE LETTER (OUTPATIENT)
Age: 39
End: 2024-12-22

## 2025-03-23 ENCOUNTER — HEALTH MAINTENANCE LETTER (OUTPATIENT)
Age: 40
End: 2025-03-23